# Patient Record
Sex: FEMALE | Race: BLACK OR AFRICAN AMERICAN | Employment: FULL TIME | URBAN - METROPOLITAN AREA
[De-identification: names, ages, dates, MRNs, and addresses within clinical notes are randomized per-mention and may not be internally consistent; named-entity substitution may affect disease eponyms.]

---

## 2018-08-13 ENCOUNTER — HOSPITAL ENCOUNTER (EMERGENCY)
Age: 28
Discharge: HOME | End: 2018-08-14
Attending: EMERGENCY MEDICINE
Payer: COMMERCIAL

## 2018-08-13 ENCOUNTER — APPOINTMENT (OUTPATIENT)
Dept: ULTRASOUND IMAGING | Age: 28
End: 2018-08-13
Payer: COMMERCIAL

## 2018-08-13 VITALS
TEMPERATURE: 99.1 F | DIASTOLIC BLOOD PRESSURE: 102 MMHG | OXYGEN SATURATION: 98 % | BODY MASS INDEX: 47.09 KG/M2 | WEIGHT: 293 LBS | SYSTOLIC BLOOD PRESSURE: 159 MMHG | RESPIRATION RATE: 14 BRPM | HEART RATE: 80 BPM | HEIGHT: 66 IN

## 2018-08-13 DIAGNOSIS — N89.8 VAGINAL DISCHARGE: Primary | ICD-10-CM

## 2018-08-13 LAB
-: ABNORMAL
AMORPHOUS: ABNORMAL
BACTERIA: ABNORMAL
BILIRUBIN URINE: NEGATIVE
CASTS UA: ABNORMAL /LPF (ref 0–8)
COLOR: YELLOW
COMMENT UA: ABNORMAL
CRYSTALS, UA: ABNORMAL /HPF
DIRECT EXAM: NORMAL
EPITHELIAL CELLS UA: ABNORMAL /HPF (ref 0–5)
GLUCOSE URINE: NEGATIVE
HCG(URINE) PREGNANCY TEST: NEGATIVE
KETONES, URINE: NEGATIVE
LEUKOCYTE ESTERASE, URINE: NEGATIVE
Lab: NORMAL
MUCUS: ABNORMAL
NITRITE, URINE: NEGATIVE
OTHER OBSERVATIONS UA: ABNORMAL
PH UA: 5.5 (ref 5–8)
PROTEIN UA: NEGATIVE
RBC UA: ABNORMAL /HPF (ref 0–4)
RENAL EPITHELIAL, UA: ABNORMAL /HPF
SPECIFIC GRAVITY UA: 1.02 (ref 1–1.03)
SPECIMEN DESCRIPTION: NORMAL
STATUS: NORMAL
TRICHOMONAS: ABNORMAL
TURBIDITY: CLEAR
URINE HGB: ABNORMAL
UROBILINOGEN, URINE: NORMAL
WBC UA: ABNORMAL /HPF (ref 0–5)
YEAST: ABNORMAL

## 2018-08-13 PROCEDURE — 87591 N.GONORRHOEAE DNA AMP PROB: CPT

## 2018-08-13 PROCEDURE — 81001 URINALYSIS AUTO W/SCOPE: CPT

## 2018-08-13 PROCEDURE — 76830 TRANSVAGINAL US NON-OB: CPT

## 2018-08-13 PROCEDURE — 87480 CANDIDA DNA DIR PROBE: CPT

## 2018-08-13 PROCEDURE — 99284 EMERGENCY DEPT VISIT MOD MDM: CPT

## 2018-08-13 PROCEDURE — 96374 THER/PROPH/DIAG INJ IV PUSH: CPT

## 2018-08-13 PROCEDURE — 93976 VASCULAR STUDY: CPT

## 2018-08-13 PROCEDURE — 6370000000 HC RX 637 (ALT 250 FOR IP): Performed by: EMERGENCY MEDICINE

## 2018-08-13 PROCEDURE — 87491 CHLMYD TRACH DNA AMP PROBE: CPT

## 2018-08-13 PROCEDURE — 87510 GARDNER VAG DNA DIR PROBE: CPT

## 2018-08-13 PROCEDURE — 6360000002 HC RX W HCPCS: Performed by: EMERGENCY MEDICINE

## 2018-08-13 PROCEDURE — 87660 TRICHOMONAS VAGIN DIR PROBE: CPT

## 2018-08-13 PROCEDURE — 84703 CHORIONIC GONADOTROPIN ASSAY: CPT

## 2018-08-13 RX ORDER — CEFTRIAXONE SODIUM 250 MG/1
250 INJECTION, POWDER, FOR SOLUTION INTRAMUSCULAR; INTRAVENOUS ONCE
Status: COMPLETED | OUTPATIENT
Start: 2018-08-13 | End: 2018-08-13

## 2018-08-13 RX ORDER — DOXYCYCLINE 100 MG/1
100 TABLET ORAL 2 TIMES DAILY
Qty: 28 TABLET | Refills: 0 | Status: SHIPPED | OUTPATIENT
Start: 2018-08-13 | End: 2018-08-27

## 2018-08-13 RX ORDER — METRONIDAZOLE 500 MG/1
500 TABLET ORAL 2 TIMES DAILY
Qty: 28 TABLET | Refills: 0 | Status: SHIPPED | OUTPATIENT
Start: 2018-08-13 | End: 2018-08-27

## 2018-08-13 RX ORDER — AZITHROMYCIN 250 MG/1
1000 TABLET, FILM COATED ORAL ONCE
Status: COMPLETED | OUTPATIENT
Start: 2018-08-13 | End: 2018-08-13

## 2018-08-13 RX ADMIN — CEFTRIAXONE SODIUM 250 MG: 250 INJECTION, POWDER, FOR SOLUTION INTRAMUSCULAR; INTRAVENOUS at 23:12

## 2018-08-13 RX ADMIN — AZITHROMYCIN 1000 MG: 250 TABLET, FILM COATED ORAL at 23:12

## 2018-08-13 ASSESSMENT — PAIN DESCRIPTION - DESCRIPTORS
DESCRIPTORS_2: SHOOTING;ACHING;SHARP
DESCRIPTORS: SHARP;ACHING;SHOOTING

## 2018-08-13 ASSESSMENT — PAIN DESCRIPTION - PAIN TYPE
TYPE_2: ACUTE PAIN
TYPE: ACUTE PAIN

## 2018-08-13 ASSESSMENT — PAIN SCALES - GENERAL: PAINLEVEL_OUTOF10: 8

## 2018-08-13 ASSESSMENT — PAIN DESCRIPTION - LOCATION
LOCATION_2: ABDOMEN
LOCATION: FLANK

## 2018-08-13 ASSESSMENT — PAIN DESCRIPTION - ONSET
ONSET_2: SUDDEN
ONSET: SUDDEN

## 2018-08-13 ASSESSMENT — PAIN DESCRIPTION - FREQUENCY: FREQUENCY: INTERMITTENT

## 2018-08-13 ASSESSMENT — PAIN DESCRIPTION - INTENSITY: RATING_2: 8

## 2018-08-13 ASSESSMENT — PAIN DESCRIPTION - PROGRESSION
CLINICAL_PROGRESSION: GRADUALLY WORSENING
CLINICAL_PROGRESSION_2: GRADUALLY WORSENING

## 2018-08-13 ASSESSMENT — PAIN DESCRIPTION - DURATION: DURATION_2: INTERMITTENT

## 2018-08-13 ASSESSMENT — PAIN DESCRIPTION - ORIENTATION
ORIENTATION_2: LOWER
ORIENTATION: RIGHT

## 2018-08-13 NOTE — ED NOTES
Bed: 44  Expected date:   Expected time:   Means of arrival:   Comments:     Roshan Chris RN  08/13/18 1955

## 2018-08-14 LAB
C TRACH DNA GENITAL QL NAA+PROBE: NEGATIVE
N. GONORRHOEAE DNA: NEGATIVE

## 2018-08-14 ASSESSMENT — ENCOUNTER SYMPTOMS
SORE THROAT: 0
VOMITING: 0
ABDOMINAL PAIN: 1
SHORTNESS OF BREATH: 0

## 2018-08-14 NOTE — ED PROVIDER NOTES
OBGYN      OUTSTANDING TASKS / RECOMMENDATIONS:    1. Await ultrasound results  2. If ultrasound unremarkable, discharge with doxycycline and OBGYN follow-up. FINAL IMPRESSION:     1. Vaginal discharge      Ultrasound unremarkable for torsion, tuboovarian abscess, or migration of IUD. Patient updated on results. Doxycycline prescription provided. Patient plans to arrange follow up with [facility] tomorrow. Patient also plans to follow up with her PCP. I counseled the patient to return to the ED for any worsening symptoms. Patient discharged in stable condition.     DISPOSITION:         DISPOSITION:  [x]  Discharge   []  Transfer -    []  Admission -     []  Against Medical Advice   []  Eloped   FOLLOW-UP: [facility]  [address] [facility] [id number] [phone]    If symptoms worsen    Jeffrey William, [address] [facility]  [phone]    Schedule an appointment as soon as possible for a visit        DISCHARGE MEDICATIONS: Discharge Medication List as of 8/13/2018 11:24 PM      START taking these medications    Details   doxycycline monohydrate (ADOXA) 100 MG tablet Take 1 tablet by mouth 2 times daily for 14 days, Disp-28 tablet, R-0Print      metroNIDAZOLE (FLAGYL) 500 MG tablet Take 1 tablet by mouth 2 times daily for 14 days, Disp-28 tablet, R-0Print                Brannon Mckay MD  Emergency Medicine Resident  [address], [place]  81/64/15 [phone]

## 2018-08-14 NOTE — ED PROVIDER NOTES
but appears nontoxic. Abdomen is benign. No pelvic or abdominal tenderness. No CVA tenderness. Impression is pelvic pain, consider UTI, stone, ovarian pathology, unlikely torsion, consider TOA or PID. Plan is hCG, urinalysis, pelvic exam.  If pelvic tenderness would perform ultrasound to assess location of IUD and to exclude TOA or torsion. Becka Rodarte.  Librado Briceno MD, [facility]  Attending Emergency  Physician                Abigail Gaitan MD  08/13/18 [id number]

## 2018-08-14 NOTE — ED PROVIDER NOTES
[address] [facility]  ED  Emergency Department Encounter  Emergency Medicine Resident     Pt Name: Octavio Pozo  MRN: [id number]  [place] 1990  Date of evaluation: 8/13/18  PCP:  No primary care provider on file. CHIEF COMPLAINT       Chief Complaint   Patient presents with    Pelvic Pain     Ongoing for a month. No problems urinating. Pt does have an IUD.  Flank Pain     Right flank ongoing for a month as well. HISTORY OF PRESENT ILLNESS  (Location/Symptom, Timing/Onset, Context/Setting, Quality, Duration, Modifying Factors, Severity.)      Octavio Pozo is a 33 y.o. female who presents with Pelvic pain. Patient states she's been having lower abdominal and pelvic pain wrapping around to her right flank for the past month. She states she is tolerating by mouth intake. Denies any nausea or vomiting. Denies any hematuria, dysuria, does states she's been having vaginal discharge and would like to be tested and treated for STDs. She states she had IUD placed 2 months ago and has not had follow-up since then. Denies any fevers or chills. PAST MEDICAL / SURGICAL / SOCIAL / FAMILY HISTORY      has no past medical history on file. has no past surgical history on file. Social History     Social History    Marital status: Single     Spouse name: N/A    Number of children: N/A    Years of education: N/A     Occupational History    Not on file. Social History Main Topics    Smoking status: Not on file    Smokeless tobacco: Not on file    Alcohol use Not on file    Drug use: Unknown    Sexual activity: Not on file     Other Topics Concern    Not on file     Social History Narrative    No narrative on file       No family history on file. Allergies:  Patient has no allergy information on record. Home Medications:  Prior to Admission medications    Medication Sig Start Date End Date Taking?  Authorizing Provider   doxycycline monohydrate (ADOXA) 100 MG tablet Ref Range    Color, UA YELLOW YEL    Turbidity UA CLEAR CLEAR    Glucose, Ur NEGATIVE NEG    Bilirubin Urine NEGATIVE NEG    Ketones, Urine NEGATIVE NEG    Specific [place], UA 1.021 1.005 - 1.030    Urine Hgb SMALL (A) NEG    pH, UA 5.5 5.0 - 8.0    Protein, UA NEGATIVE NEG    Urobilinogen, Urine Normal NORM    Nitrite, Urine NEGATIVE NEG    Leukocyte Esterase, Urine NEGATIVE NEG    Urinalysis Comments NOT REPORTED    Pregnancy, Urine   Result Value Ref Range    HCG(Urine) Pregnancy Test NEGATIVE NEG   Microscopic Urinalysis   Result Value Ref Range    -          WBC, UA 2 TO 5 0 - 5 /HPF    RBC, UA 10 TO 20 0 - 4 /HPF    Casts UA 2 TO 5 HYALINE 0 - 8 /LPF    Crystals UA NOT REPORTED NONE /HPF    Epithelial Cells UA 2 TO 5 0 - 5 /HPF    Renal Epithelial, Urine NOT REPORTED 0 /HPF    Bacteria, UA FEW (A) NONE    Mucus, UA NOT REPORTED NONE    Trichomonas, UA NOT REPORTED NONE    Amorphous, UA NOT REPORTED NONE    Other Observations UA NOT REPORTED NREQ    Yeast, UA NOT REPORTED NONE       RADIOLOGY:  None    EKG  None    All EKG's are interpreted by the Emergency Department Physician who either signs or Co-signs this chart in the absence of a cardiologist.    MDM/EMERGENCY DEPARTMENT COURSE:  Patient is a 30-year-old female that presents with pelvic pain, IUD placed 2 months ago. On exam she is well-appearing, nontoxic, afebrile, minimal suprapubic tenderness. Pelvic exam performed with breathing RN in room, yellowish vaginal discharge present in the vaginal vault, no masses, no lesions, no bleeding visualized, on bimanual exam patient has left adnexal tenderness with no masses palpated, no cervical motion tenderness. Urinalysis negative, vaginitis negative, we'll treat patient with Rocephin and Zithromax. Unable to visualize the IUD string during pelvic exam cold ultrasound to rule out any migration of IUD and to rule out tubo-ovarian abscess. Ultrasound showing IUD in place, no acute pathology.   Patient will be discharged with doxycycline for PID treatment. Number given for gynecology follow-up. Instructed to return if symptoms worsen. Patient signed out to Dr. Camryn Coley. PROCEDURES:  None    CONSULTS:  None    CRITICAL CARE:  None    FINAL IMPRESSION      1. Vaginal discharge          DISPOSITION / PLAN     DISPOSITION Decision To Discharge 08/14/2018 12:53:15 AM      PATIENT REFERRED TO:  [facility] ED  [address] [address]  [phone]    If symptoms worsen    Ronakerum Edna, [address] [facility]  [phone]    Schedule an appointment as soon as possible for a visit         DISCHARGE MEDICATIONS:  Discharge Medication List as of 8/13/2018 11:24 PM      START taking these medications    Details   doxycycline monohydrate (ADOXA) 100 MG tablet Take 1 tablet by mouth 2 times daily for 14 days, Disp-28 tablet, R-0Print      metroNIDAZOLE (FLAGYL) 500 MG tablet Take 1 tablet by mouth 2 times daily for 14 days, Disp-28 tablet, R-0Print             Frannie Bailey MD  Emergency Medicine Resident    (Please note that portions of this note were completed with a voice recognition program.  Efforts were made to edit the dictations but occasionally words are mis-transcribed. )        Frannie Bailey MD  Resident  08/14/18 [id number]

## 2018-12-07 ENCOUNTER — OFFICE VISIT (OUTPATIENT)
Dept: OBGYN | Age: 28
End: 2018-12-07
Payer: COMMERCIAL

## 2018-12-07 VITALS
DIASTOLIC BLOOD PRESSURE: 70 MMHG | HEART RATE: 60 BPM | SYSTOLIC BLOOD PRESSURE: 130 MMHG | WEIGHT: 293 LBS | BODY MASS INDEX: 60.85 KG/M2

## 2018-12-07 DIAGNOSIS — Z72.0 TOBACCO USE: ICD-10-CM

## 2018-12-07 DIAGNOSIS — Z30.432 ENCOUNTER FOR IUD REMOVAL: Primary | ICD-10-CM

## 2018-12-07 DIAGNOSIS — N93.9 ABNORMAL UTERINE BLEEDING: ICD-10-CM

## 2018-12-07 DIAGNOSIS — Z30.430 ENCOUNTER FOR INSERTION OF MIRENA IUD: ICD-10-CM

## 2018-12-07 DIAGNOSIS — F12.10 MARIJUANA ABUSE: ICD-10-CM

## 2018-12-07 PROCEDURE — 4004F PT TOBACCO SCREEN RCVD TLK: CPT | Performed by: STUDENT IN AN ORGANIZED HEALTH CARE EDUCATION/TRAINING PROGRAM

## 2018-12-07 PROCEDURE — 99203 OFFICE O/P NEW LOW 30 MIN: CPT | Performed by: STUDENT IN AN ORGANIZED HEALTH CARE EDUCATION/TRAINING PROGRAM

## 2018-12-07 PROCEDURE — G8417 CALC BMI ABV UP PARAM F/U: HCPCS | Performed by: STUDENT IN AN ORGANIZED HEALTH CARE EDUCATION/TRAINING PROGRAM

## 2018-12-07 PROCEDURE — G8428 CUR MEDS NOT DOCUMENT: HCPCS | Performed by: STUDENT IN AN ORGANIZED HEALTH CARE EDUCATION/TRAINING PROGRAM

## 2018-12-07 PROCEDURE — G8484 FLU IMMUNIZE NO ADMIN: HCPCS | Performed by: STUDENT IN AN ORGANIZED HEALTH CARE EDUCATION/TRAINING PROGRAM

## 2018-12-07 PROCEDURE — 99212 OFFICE O/P EST SF 10 MIN: CPT | Performed by: STUDENT IN AN ORGANIZED HEALTH CARE EDUCATION/TRAINING PROGRAM

## 2018-12-07 NOTE — PROGRESS NOTES
Darryl Alcaraz  2018    YOB: 1990      HPI:  Darryl Alcaraz is a 33 y.o. female [id number] The patient was seen today as a new patient, previously established with OB/GYN at [facility] in [facility] (Dr. Hermila Cleaning). She is here regarding IUD removal. Patient reports she had IUD placed on 18 secondary to abnormal uterine bleeding and reports bleeding has improved with IUD. She states she would like her IUD removed as she and her partner desires pregnancy. Patient has no complaints today. Patient reports she is currently sexually active with one male partner and uses IUD for contraception. Her bowels are regular and she is voiding without difficulty. Denies F/C, HA, VC, CP, SOB, RUQ pain, N/V/D/C, urinary symptoms or vaginal discharge. Patient reports she just wanted a consultation today, and would like to come back next week for IUD removal. She states she is concerned she will begin bleeding after IUD is removed and she would like to wait until after the weekend. Obstetric History       T2      L2     SAB0   TAB0   Ectopic0   Molar0   Multiple0   Live Births2       # Outcome Date GA Lbr Rodger/2nd Weight Sex Delivery Anes PTL Lv   3 AB 16 10w0d    SAB   ND   2 Term 09 39w3d  7 lb 8 oz (3.402 kg) F Vag-Spont EPI  REED      Name: Nina Becerril   1 Term 08 40w1d  6 lb 15 oz (3.147 kg) F Vag-Spont EPI N REED      Name: [facility]      Obstetric Comments   Same FOB       History reviewed. No pertinent past medical history. Past Surgical History:   Procedure Laterality Date    WISDOM TOOTH EXTRACTION Left        Family History   Problem Relation Age of Onset    Diabetes Mother        Social History     Social History    Marital status: Single     Spouse name: N/A    Number of children: N/A    Years of education: N/A     Occupational History    Not on file.      Social History Main Topics    Smoking status: Current Every Day Smoker     Types: Cigars     Start date: 2003    Smokeless tobacco: Never Used      Comment: 1-2 black and milds daily    Alcohol use Yes      Comment: Occasionally     Drug use: Yes     Types: Marijuana      Comment: daily use    Sexual activity: Yes     Partners: Male     Birth control/ protection: IUD     Other Topics Concern    Not on file     Social History Narrative    No narrative on file         MEDICATIONS:  No current outpatient prescriptions on file. No current facility-administered medications for this visit. ALLERGIES:  Allergies as of 12/07/2018    (No Known Allergies)         REVIEW OF SYSTEMS:       A minimum of an eleven point review of systems was completed. Review Of Systems (11 point):  Constitutional: No fever, chills or malaise; No weight change or fatigue  Head and Eyes: No vision, Headache, Dizziness or trauma in last 12 months  ENT ROS: No hearing, Tinnitis, sinus or taste problems  Hematological and Lymphatic ROS:No Lymphoma, Von Willebrand's, Hemophillia or Bleeding History  Psych ROS: No Depression, Homicidal thoughts,suicidal thoughts, or anxiety  Breast ROS: No prior breast abnormalities or lumps  Respiratory ROS: No SOB, Pneumoniae,Cough, or Pulmonary Embolism History  Cardiovascular ROS: No Chest Pain with Exertion, Palpitations, Syncope, Edema, Arrhythmia  Gastrointestinal ROS: No Indigestion, Heartburn, Nausea, vomiting, Diarrhea, Constipation,or Bowel Changes; No Bloody Stools or melena  Genito-Urinary ROS: No Dysuria, Hematuria or Nocturia. No Urinary Incontinence or Vaginal Discharge  Musculoskeletal ROS: No Arthralgia, Arthritis,Gout,Osteoporosis or Rheumatism  Neurological ROS: No CVA, Migraines, Epilepsy, Seizure Hx, or Limb Weakness  Dermatological ROS: No Rash, Itching, Hives, Mole Changes or Cancer        Vitals:   Blood pressure 130/70, pulse 60, weight (!) 377 lb (171 kg), last menstrual period 06/01/2018. Abdomen: Soft non-tender; good bowel sounds.  No guarding, rebound or rigidity. No CVA tenderness bilaterally. Extremities: No calf tenderness, DTR 2/4, and No edema bilaterally    Pelvic: declined by patient today    Diagnostics:    Narrative   EXAMINATION:   PELVIC ULTRASOUND; DOPPLER EVALUATION       8/13/2018       TECHNIQUE:   Transvaginal pelvic ultrasound was performed.; DOPPLER ULTRASOUND OF THE   PELVIS       COMPARISON:   None       HISTORY:   ORDERING SYSTEM PROVIDED HISTORY: rule out torsion, recent IUD placed,   evaluate placement       FINDINGS:   Slightly limited examination due to patient's body habitus and associated   scanning characteristics.           Measurements:       Uterus:  9.2 x 5.7 x 4.8 cm.       Endometrial stripe:  6 mm       Right Ovary:  3.0 x 2.7 x 2.0 cm       Left Ovary:  3.1 x 1.9 x 2.1 cm           Ultrasound Findings:       Uterus: Uterus demonstrates normal myometrial echotexture.       Endometrial stripe: Echogenic intrauterine device in place, within the   uterine body and lower uterine segment.       Right Ovary: Right ovary is within normal limits.  There is normal arterial   and venous doppler flow.       Left Ovary:  Left ovary is within normal limits. There is normal arterial and   venous doppler flow.       Free Fluid: No evidence of free fluid.           Impression   1. No acute findings. 2. No evidence of ovarian torsion bilaterally. 3. Intrauterine device identified in the endometrial canal, within the   uterine body and lower uterine segment. Lab Results:  Results for orders placed or performed during the hospital encounter of 08/13/18   C.trachomatis N.gonorrhoeae DNA   Result Value Ref Range    C. trachomatis DNA NEGATIVE NEG    N. gonorrhoeae DNA NEGATIVE NEG   VAGINITIS DNA PROBE   Result Value Ref Range    Specimen Description . VAGINA     Special Requests NOT REPORTED     Direct Exam NEGATIVE for Candida sp.      Direct Exam NEGATIVE for Gardnerella vaginalis     Direct Exam NEGATIVE for

## 2018-12-14 ENCOUNTER — HOSPITAL ENCOUNTER (OUTPATIENT)
Age: 28
Setting detail: SPECIMEN
Discharge: HOME | End: 2018-12-14
Payer: COMMERCIAL

## 2018-12-14 ENCOUNTER — TELEPHONE (OUTPATIENT)
Dept: OBGYN | Age: 28
End: 2018-12-14

## 2018-12-14 ENCOUNTER — PROCEDURE VISIT (OUTPATIENT)
Dept: OBGYN | Age: 28
End: 2018-12-14
Payer: COMMERCIAL

## 2018-12-14 VITALS
BODY MASS INDEX: 61.75 KG/M2 | SYSTOLIC BLOOD PRESSURE: 151 MMHG | HEART RATE: 68 BPM | WEIGHT: 293 LBS | DIASTOLIC BLOOD PRESSURE: 94 MMHG

## 2018-12-14 DIAGNOSIS — I10 HYPERTENSION, UNSPECIFIED TYPE: ICD-10-CM

## 2018-12-14 DIAGNOSIS — I10 CHRONIC HYPERTENSION: ICD-10-CM

## 2018-12-14 DIAGNOSIS — Z30.432 ENCOUNTER FOR IUD REMOVAL: ICD-10-CM

## 2018-12-14 DIAGNOSIS — Z30.432 ENCOUNTER FOR IUD REMOVAL: Primary | ICD-10-CM

## 2018-12-14 LAB
CONTROL: PRESENT
ESTIMATED AVERAGE GLUCOSE: 105 MG/DL
HBA1C MFR BLD: 5.3 % (ref 4–6)
PREGNANCY TEST URINE, POC: NEGATIVE

## 2018-12-14 PROCEDURE — 81025 URINE PREGNANCY TEST: CPT | Performed by: STUDENT IN AN ORGANIZED HEALTH CARE EDUCATION/TRAINING PROGRAM

## 2018-12-14 PROCEDURE — 36415 COLL VENOUS BLD VENIPUNCTURE: CPT

## 2018-12-14 PROCEDURE — 58301 REMOVE INTRAUTERINE DEVICE: CPT | Performed by: STUDENT IN AN ORGANIZED HEALTH CARE EDUCATION/TRAINING PROGRAM

## 2018-12-14 PROCEDURE — 83036 HEMOGLOBIN GLYCOSYLATED A1C: CPT

## 2018-12-14 PROCEDURE — 99213 OFFICE O/P EST LOW 20 MIN: CPT | Performed by: STUDENT IN AN ORGANIZED HEALTH CARE EDUCATION/TRAINING PROGRAM

## 2018-12-14 RX ORDER — PNV NO.95/FERROUS FUM/FOLIC AC 28MG-0.8MG
1 TABLET ORAL DAILY
Qty: 30 TABLET | Refills: 5 | Status: SHIPPED | OUTPATIENT
Start: 2018-12-14 | End: 2019-01-15

## 2018-12-14 NOTE — PROGRESS NOTES
Attending Physician Statement  I have discussed the care of Prateek Fernandez, including pertinent history and exam findings,  with the resident. I have seen and examined the patient and the key elements of all parts of the encounter have been performed by me. I agree with the assessment, plan and orders as documented by the resident.   (GC Modifier)

## 2019-01-15 ENCOUNTER — APPOINTMENT (OUTPATIENT)
Dept: CT IMAGING | Age: 29
End: 2019-01-15
Payer: COMMERCIAL

## 2019-01-15 ENCOUNTER — HOSPITAL ENCOUNTER (EMERGENCY)
Age: 29
Discharge: HOME | End: 2019-01-15
Attending: EMERGENCY MEDICINE
Payer: COMMERCIAL

## 2019-01-15 VITALS
TEMPERATURE: 98.8 F | SYSTOLIC BLOOD PRESSURE: 139 MMHG | DIASTOLIC BLOOD PRESSURE: 86 MMHG | RESPIRATION RATE: 14 BRPM | HEART RATE: 69 BPM | OXYGEN SATURATION: 100 %

## 2019-01-15 DIAGNOSIS — R21 RASH AND OTHER NONSPECIFIC SKIN ERUPTION: Primary | ICD-10-CM

## 2019-01-15 LAB — PREGNANCY TEST URINE, POC: NEGATIVE

## 2019-01-15 PROCEDURE — 70486 CT MAXILLOFACIAL W/O DYE: CPT

## 2019-01-15 PROCEDURE — 99284 EMERGENCY DEPT VISIT MOD MDM: CPT

## 2019-01-15 RX ORDER — CETIRIZINE HYDROCHLORIDE 10 MG/1
10 TABLET ORAL DAILY
Status: DISCONTINUED | OUTPATIENT
Start: 2019-01-15 | End: 2019-01-15 | Stop reason: HOSPADM

## 2019-01-15 ASSESSMENT — ENCOUNTER SYMPTOMS
ABDOMINAL PAIN: 0
BLOOD IN STOOL: 0
SHORTNESS OF BREATH: 0
VOMITING: 0
CHEST TIGHTNESS: 0
COLOR CHANGE: 0
CHOKING: 0
FACIAL SWELLING: 1
PHOTOPHOBIA: 0
TROUBLE SWALLOWING: 0
DIARRHEA: 0
NAUSEA: 0
STRIDOR: 0
WHEEZING: 0

## 2019-01-15 ASSESSMENT — PAIN DESCRIPTION - FREQUENCY: FREQUENCY: CONTINUOUS

## 2019-01-15 ASSESSMENT — PAIN DESCRIPTION - DESCRIPTORS: DESCRIPTORS: ACHING

## 2019-01-15 ASSESSMENT — PAIN DESCRIPTION - ORIENTATION: ORIENTATION: LEFT

## 2019-01-15 ASSESSMENT — PAIN DESCRIPTION - LOCATION: LOCATION: FACE

## 2019-02-09 ENCOUNTER — HOSPITAL ENCOUNTER (EMERGENCY)
Age: 29
Discharge: HOME | End: 2019-02-09
Attending: EMERGENCY MEDICINE
Payer: COMMERCIAL

## 2019-02-09 ENCOUNTER — APPOINTMENT (OUTPATIENT)
Dept: GENERAL RADIOLOGY | Age: 29
End: 2019-02-09
Payer: COMMERCIAL

## 2019-02-09 VITALS
RESPIRATION RATE: 14 BRPM | SYSTOLIC BLOOD PRESSURE: 156 MMHG | BODY MASS INDEX: 62.14 KG/M2 | DIASTOLIC BLOOD PRESSURE: 101 MMHG | TEMPERATURE: 98.1 F | OXYGEN SATURATION: 99 % | HEART RATE: 87 BPM | WEIGHT: 293 LBS

## 2019-02-09 DIAGNOSIS — S60.221A CONTUSION OF RIGHT HAND, INITIAL ENCOUNTER: Primary | ICD-10-CM

## 2019-02-09 PROCEDURE — 6370000000 HC RX 637 (ALT 250 FOR IP): Performed by: STUDENT IN AN ORGANIZED HEALTH CARE EDUCATION/TRAINING PROGRAM

## 2019-02-09 PROCEDURE — 73130 X-RAY EXAM OF HAND: CPT

## 2019-02-09 PROCEDURE — G0382 LEV 3 HOSP TYPE B ED VISIT: HCPCS

## 2019-02-09 RX ORDER — IBUPROFEN 600 MG/1
600 TABLET ORAL EVERY 6 HOURS PRN
Qty: 30 TABLET | Refills: 0 | Status: ON HOLD | OUTPATIENT
Start: 2019-02-09 | End: 2021-08-27 | Stop reason: HOSPADM

## 2019-02-09 RX ORDER — ACETAMINOPHEN 500 MG
1000 TABLET ORAL EVERY 6 HOURS PRN
Qty: 30 TABLET | Refills: 0 | Status: SHIPPED | OUTPATIENT
Start: 2019-02-09 | End: 2019-11-26 | Stop reason: ALTCHOICE

## 2019-02-09 RX ORDER — IBUPROFEN 800 MG/1
800 TABLET ORAL ONCE
Status: COMPLETED | OUTPATIENT
Start: 2019-02-09 | End: 2019-02-09

## 2019-02-09 RX ADMIN — IBUPROFEN 800 MG: 800 TABLET ORAL at 19:53

## 2019-02-09 ASSESSMENT — PAIN SCALES - GENERAL
PAINLEVEL_OUTOF10: 8
PAINLEVEL_OUTOF10: 8

## 2019-02-09 ASSESSMENT — PAIN DESCRIPTION - PAIN TYPE: TYPE: ACUTE PAIN

## 2019-02-09 ASSESSMENT — PAIN DESCRIPTION - PROGRESSION: CLINICAL_PROGRESSION: GRADUALLY WORSENING

## 2019-02-09 ASSESSMENT — PAIN DESCRIPTION - DESCRIPTORS: DESCRIPTORS: SHARP;THROBBING

## 2019-02-09 ASSESSMENT — PAIN DESCRIPTION - FREQUENCY: FREQUENCY: CONTINUOUS

## 2019-02-09 ASSESSMENT — PAIN DESCRIPTION - LOCATION: LOCATION: HAND

## 2019-02-09 ASSESSMENT — PAIN DESCRIPTION - ORIENTATION: ORIENTATION: RIGHT

## 2019-02-10 ASSESSMENT — ENCOUNTER SYMPTOMS
EYE REDNESS: 0
ABDOMINAL PAIN: 0
COLOR CHANGE: 0
EYE DISCHARGE: 0
SHORTNESS OF BREATH: 0

## 2019-04-24 ENCOUNTER — TELEPHONE (OUTPATIENT)
Dept: OBGYN | Age: 29
End: 2019-04-24

## 2019-11-26 ENCOUNTER — HOSPITAL ENCOUNTER (EMERGENCY)
Age: 29
Discharge: HOME | End: 2019-11-26
Attending: EMERGENCY MEDICINE
Payer: COMMERCIAL

## 2019-11-26 VITALS
RESPIRATION RATE: 14 BRPM | HEIGHT: 66 IN | WEIGHT: 293 LBS | HEART RATE: 73 BPM | TEMPERATURE: 97.3 F | BODY MASS INDEX: 47.09 KG/M2 | SYSTOLIC BLOOD PRESSURE: 165 MMHG | OXYGEN SATURATION: 100 % | DIASTOLIC BLOOD PRESSURE: 95 MMHG

## 2019-11-26 DIAGNOSIS — Z01.419 NORMAL VAGINAL EXAM: ICD-10-CM

## 2019-11-26 DIAGNOSIS — R51.9 NONINTRACTABLE HEADACHE, UNSPECIFIED CHRONICITY PATTERN, UNSPECIFIED HEADACHE TYPE: Primary | ICD-10-CM

## 2019-11-26 LAB
-: NORMAL
AMORPHOUS: NORMAL
BACTERIA: NORMAL
BILIRUBIN URINE: NEGATIVE
CASTS UA: NORMAL /LPF (ref 0–8)
COLOR: ABNORMAL
COMMENT UA: ABNORMAL
CRYSTALS, UA: NORMAL /HPF
DIRECT EXAM: ABNORMAL
EPITHELIAL CELLS UA: NORMAL /HPF (ref 0–5)
GLUCOSE URINE: NEGATIVE
HCG(URINE) PREGNANCY TEST: NEGATIVE
KETONES, URINE: NEGATIVE
LEUKOCYTE ESTERASE, URINE: ABNORMAL
Lab: ABNORMAL
MUCUS: NORMAL
NITRITE, URINE: NEGATIVE
OTHER OBSERVATIONS UA: NORMAL
PH UA: 7 (ref 5–8)
PROTEIN UA: ABNORMAL
RBC UA: NORMAL /HPF (ref 0–4)
RENAL EPITHELIAL, UA: NORMAL /HPF
SPECIFIC GRAVITY UA: 1.02 (ref 1–1.03)
SPECIMEN DESCRIPTION: ABNORMAL
TRICHOMONAS: NORMAL
TURBIDITY: CLEAR
URINE HGB: ABNORMAL
UROBILINOGEN, URINE: NORMAL
WBC UA: NORMAL /HPF (ref 0–5)
YEAST: NORMAL

## 2019-11-26 PROCEDURE — 87591 N.GONORRHOEAE DNA AMP PROB: CPT

## 2019-11-26 PROCEDURE — 87660 TRICHOMONAS VAGIN DIR PROBE: CPT

## 2019-11-26 PROCEDURE — 87510 GARDNER VAG DNA DIR PROBE: CPT

## 2019-11-26 PROCEDURE — 86403 PARTICLE AGGLUT ANTBDY SCRN: CPT

## 2019-11-26 PROCEDURE — 96374 THER/PROPH/DIAG INJ IV PUSH: CPT

## 2019-11-26 PROCEDURE — 87086 URINE CULTURE/COLONY COUNT: CPT

## 2019-11-26 PROCEDURE — 81001 URINALYSIS AUTO W/SCOPE: CPT

## 2019-11-26 PROCEDURE — 96375 TX/PRO/DX INJ NEW DRUG ADDON: CPT

## 2019-11-26 PROCEDURE — 6360000002 HC RX W HCPCS: Performed by: NURSE PRACTITIONER

## 2019-11-26 PROCEDURE — 87491 CHLMYD TRACH DNA AMP PROBE: CPT

## 2019-11-26 PROCEDURE — 81025 URINE PREGNANCY TEST: CPT

## 2019-11-26 PROCEDURE — 99284 EMERGENCY DEPT VISIT MOD MDM: CPT

## 2019-11-26 PROCEDURE — 87480 CANDIDA DNA DIR PROBE: CPT

## 2019-11-26 PROCEDURE — 6370000000 HC RX 637 (ALT 250 FOR IP): Performed by: NURSE PRACTITIONER

## 2019-11-26 PROCEDURE — 2580000003 HC RX 258: Performed by: NURSE PRACTITIONER

## 2019-11-26 RX ORDER — ONDANSETRON 4 MG/1
4 TABLET, FILM COATED ORAL EVERY 8 HOURS PRN
Qty: 20 TABLET | Refills: 0 | Status: SHIPPED | OUTPATIENT
Start: 2019-11-26 | End: 2021-06-21

## 2019-11-26 RX ORDER — ACETAMINOPHEN 500 MG
1000 TABLET ORAL ONCE
Status: COMPLETED | OUTPATIENT
Start: 2019-11-26 | End: 2019-11-26

## 2019-11-26 RX ORDER — 0.9 % SODIUM CHLORIDE 0.9 %
1000 INTRAVENOUS SOLUTION INTRAVENOUS ONCE
Status: COMPLETED | OUTPATIENT
Start: 2019-11-26 | End: 2019-11-26

## 2019-11-26 RX ORDER — DIPHENHYDRAMINE HYDROCHLORIDE 50 MG/ML
25 INJECTION INTRAMUSCULAR; INTRAVENOUS ONCE
Status: COMPLETED | OUTPATIENT
Start: 2019-11-26 | End: 2019-11-26

## 2019-11-26 RX ORDER — ACETAMINOPHEN 500 MG
1000 TABLET ORAL EVERY 6 HOURS PRN
Qty: 60 TABLET | Refills: 0 | Status: SHIPPED | OUTPATIENT
Start: 2019-11-26 | End: 2021-06-21

## 2019-11-26 RX ORDER — ONDANSETRON 2 MG/ML
4 INJECTION INTRAMUSCULAR; INTRAVENOUS ONCE
Status: COMPLETED | OUTPATIENT
Start: 2019-11-26 | End: 2019-11-26

## 2019-11-26 RX ADMIN — ACETAMINOPHEN 1000 MG: 500 TABLET ORAL at 12:05

## 2019-11-26 RX ADMIN — DIPHENHYDRAMINE HYDROCHLORIDE 25 MG: 50 INJECTION, SOLUTION INTRAMUSCULAR; INTRAVENOUS at 11:37

## 2019-11-26 RX ADMIN — SODIUM CHLORIDE 1000 ML: 9 INJECTION, SOLUTION INTRAVENOUS at 11:37

## 2019-11-26 RX ADMIN — ONDANSETRON 4 MG: 2 INJECTION INTRAMUSCULAR; INTRAVENOUS at 11:49

## 2019-11-26 ASSESSMENT — ENCOUNTER SYMPTOMS
EYE REDNESS: 0
TROUBLE SWALLOWING: 0
EYE PAIN: 0
BACK PAIN: 0
CONSTIPATION: 0
FACIAL SWELLING: 0
DIARRHEA: 0
SHORTNESS OF BREATH: 0
COUGH: 0
VOMITING: 0
VOICE CHANGE: 0
RHINORRHEA: 0
BLOOD IN STOOL: 0
NAUSEA: 1
RECTAL PAIN: 0
CHEST TIGHTNESS: 0
ABDOMINAL PAIN: 0
PHOTOPHOBIA: 1

## 2019-11-26 ASSESSMENT — VISUAL ACUITY: OU: 1

## 2019-11-26 ASSESSMENT — PAIN DESCRIPTION - DESCRIPTORS: DESCRIPTORS: DISCOMFORT

## 2019-11-26 ASSESSMENT — PAIN DESCRIPTION - PAIN TYPE: TYPE: ACUTE PAIN

## 2019-11-26 ASSESSMENT — PAIN DESCRIPTION - LOCATION: LOCATION: HEAD

## 2019-11-26 ASSESSMENT — PAIN SCALES - GENERAL: PAINLEVEL_OUTOF10: 8

## 2019-11-27 LAB
C TRACH DNA GENITAL QL NAA+PROBE: NEGATIVE
N. GONORRHOEAE DNA: NEGATIVE
SPECIMEN DESCRIPTION: NORMAL

## 2019-11-28 LAB
CULTURE: ABNORMAL
Lab: ABNORMAL
SPECIMEN DESCRIPTION: ABNORMAL

## 2020-01-02 ENCOUNTER — HOSPITAL ENCOUNTER (EMERGENCY)
Age: 30
Discharge: HOME | End: 2020-01-02
Attending: EMERGENCY MEDICINE
Payer: COMMERCIAL

## 2020-01-02 VITALS
RESPIRATION RATE: 17 BRPM | DIASTOLIC BLOOD PRESSURE: 101 MMHG | SYSTOLIC BLOOD PRESSURE: 154 MMHG | OXYGEN SATURATION: 100 % | TEMPERATURE: 97.2 F | HEART RATE: 71 BPM

## 2020-01-02 PROCEDURE — 99283 EMERGENCY DEPT VISIT LOW MDM: CPT

## 2020-01-02 ASSESSMENT — VISUAL ACUITY
OU: 20/50
OS: 20/25
OD: 20/20

## 2020-01-02 ASSESSMENT — ENCOUNTER SYMPTOMS
BLOOD IN STOOL: 0
EYE REDNESS: 0
NAUSEA: 0
DIARRHEA: 0
SORE THROAT: 0
EYE ITCHING: 0
BACK PAIN: 0
RHINORRHEA: 0
COUGH: 0
ABDOMINAL PAIN: 0
VOMITING: 0
SHORTNESS OF BREATH: 0

## 2020-01-02 ASSESSMENT — PAIN SCALES - GENERAL: PAINLEVEL_OUTOF10: 7

## 2020-01-02 ASSESSMENT — PAIN DESCRIPTION - LOCATION: LOCATION: ABDOMEN

## 2020-01-02 ASSESSMENT — PAIN DESCRIPTION - PAIN TYPE: TYPE: ACUTE PAIN

## 2020-01-02 NOTE — ED PROVIDER NOTES
[address] [facility]  ED  Emergency Department Encounter  Emergency Medicine Resident     Pt Name: Óscar Spann  MRN: [id number]  [place] 1990  Date ofevaluation: 1/2/20  PCP:  No primary care provider on file. CHIEF COMPLAINT       Chief Complaint   Patient presents with    Vision Problem     vision changes while at work and needs work note to return work. Pt denies blurred vision upon arrival. Pt also reports abdominal discomfort d/t gas     HISTORY OF PRESENT ILLNESS  (Location/Symptom, Timing/Onset, Context/Setting, Quality, Duration, Modifying Factors, Severity, Associated signs/symptoms)     Óscar Spann is a 26 y.o. female who presents with blurred vision. Patient reports that she was at work several days ago when she had some transiently blurred vision lasted for approximately 20 to 30 minutes. She states that she has never had this before, and denies any other associated symptoms. She states that she had some chest pain at the time and is currently on her menstrual period but denies any fevers, chills, weakness numbness tingling, headache or other concerns. Denies any nausea or vomiting. States that her vision was blurred but denies any total loss of vision or eye pain when this occurred. She is otherwise healthy and takes no medications at baseline. Denies any neck pain, back pain. PAST MEDICAL / SURGICAL / SOCIAL / FAMILY HISTORY      has no past medical history on file. has a past surgical history that includes [place] tooth extraction (Left, 2014).     Social History     Socioeconomic History    Marital status: Single     Spouse name: Not on file    Number of children: Not on file    Years of education: Not on file    Highest education level: Not on file   Occupational History    Not on file   Social Needs    Financial resource strain: Not on file    Food insecurity:     Worry: Not on file     Inability: Not on file    Transportation needs:     Medical: Not on file     Non-medical: Not on file   Tobacco Use    Smoking status: Current Every Day Smoker     Types: Cigars     Start date: 2003    Smokeless tobacco: Never Used    Tobacco comment: 1-2 black and milds daily   Substance and Sexual Activity    Alcohol use: Yes     Comment: Occasionally     Drug use: Yes     Types: Marijuana     Comment: daily use    Sexual activity: Yes     Partners: Male     Birth control/protection: I.U.D. Lifestyle    Physical activity:     Days per week: Not on file     Minutes per session: Not on file    Stress: Not on file   Relationships    Social connections:     Talks on phone: Not on file     Gets together: Not on file     Attends Latter-day service: Not on file     Active member of club or organization: Not on file     Attends meetings of clubs or organizations: Not on file     Relationship status: Not on file    Intimate partner violence:     Fear of current or ex partner: Not on file     Emotionally abused: Not on file     Physically abused: Not on file     Forced sexual activity: Not on file   Other Topics Concern    Not on file   Social History Narrative    Not on file       Family History   Problem Relation Age of Onset    Diabetes Mother        Allergies:  Patient has no known allergies. Home Medications:  Prior to Admission medications    Medication Sig Start Date End Date Taking? Authorizing Provider   ondansetron (ZOFRAN) 4 MG tablet Take 1 tablet by mouth every 8 hours as needed for Nausea or Vomiting 11/26/19   Shahram Young, APRN - CNP   acetaminophen (TYLENOL) 500 MG tablet Take 2 tablets by mouth every 6 hours as needed for Pain 11/26/19   Shahram Young, APRN - CNP   ibuprofen (ADVIL;MOTRIN) 600 MG tablet Take 1 tablet by mouth every 6 hours as needed for Pain 2/9/19   Nicolette Watts, DO       REVIEW OF SYSTEMS    (2-9 systems for level 4, 10 or more for level 5)      Review of Systems   Constitutional: Negative for chills and fever.    HENT: Negative for rhinorrhea and sore throat. Eyes: Positive for visual disturbance. Negative for redness and itching. Respiratory: Negative for cough and shortness of breath. Cardiovascular: Positive for chest pain. Negative for palpitations. Gastrointestinal: Negative for abdominal pain, blood in stool, diarrhea, nausea and vomiting. Genitourinary: Positive for vaginal bleeding. Negative for dysuria, frequency and hematuria. Musculoskeletal: Negative for back pain and neck pain. Skin: Negative for rash and wound. Allergic/Immunologic: Negative for environmental allergies and food allergies. Neurological: Negative for weakness, numbness and headaches. PHYSICAL EXAM   (up to 7 for level 4, 8 or more for level 5)      INITIAL VITALS:   BP (!) 154/101   Pulse 71   Temp 97.2 °F (36.2 °C) (Oral)   Resp 17   LMP 12/30/2019   SpO2 100%     Physical Exam  Vitals signs and nursing note reviewed. Constitutional:       General: She is not in acute distress. Appearance: Normal appearance. She is obese. She is not ill-appearing, toxic-appearing or diaphoretic. HENT:      Head: Normocephalic and atraumatic. Mouth/Throat:      Mouth: Mucous membranes are moist.      Pharynx: Oropharynx is clear. No oropharyngeal exudate or posterior oropharyngeal erythema. Eyes:      General: No scleral icterus. Extraocular Movements: Extraocular movements intact. Conjunctiva/sclera: Conjunctivae normal.      Pupils: Pupils are equal, round, and reactive to light. Comments: No gross visual deficits. Confrontation visual field is intact bilaterally. Neck:      Musculoskeletal: Neck supple. Cardiovascular:      Rate and Rhythm: Normal rate and regular rhythm. Heart sounds: Normal heart sounds. No friction rub. No gallop. Pulmonary:      Effort: Pulmonary effort is normal. No respiratory distress. Breath sounds: Normal breath sounds. No stridor. No wheezing, rhonchi or rales. Abdominal:      General: There is no distension. Palpations: Abdomen is soft. There is no mass. Tenderness: There is no tenderness. There is no guarding or rebound. Hernia: No hernia is present. Skin:     General: Skin is warm and dry. Findings: No rash (over exposed skin). Neurological:      General: No focal deficit present. Mental Status: She is alert and oriented to person, place, and time. Comments: CN II-XII intact. Full strength and sensation in upper and lower extremities bilaterally. Normal FNF      Psychiatric:         Mood and Affect: Mood normal.         Behavior: Behavior normal.       DIAGNOSTICS     PLAN (LABS / IMAGING / EKG):  No orders of the defined types were placed in this encounter. MEDICATIONS ORDERED:  No orders of the defined types were placed in this encounter. DIAGNOSTIC RESULTS / EMERGENCYDEPARTMENT COURSE / MDM     LABS:  No results found for this visit on 01/02/20. EMERGENCY DEPARTMENT COURSE:    Patient evaluated by attending physician and myself. Patient presenting with weird vision that began several days ago and lasted for approximately 30 minutes. No accompanying symptoms such as headache, weakness, numbness, tingling. Her vision is back to baseline now. On exam she is well-appearing no acute distress. She is hypertensive but vitals otherwise unremarkable. Heart regular rate and rhythm, lungs are clear to auscultation bilateral.  Abdomen soft nontender. Extraocular movements are intact, pupils are equal round and reactive to light bilaterally. Visual fields are intact by confrontation. Visual acuity roughly normal done per nursing staff. Differential diagnosis includes migraine headache, diabetic retinopathy, acute angle-closure glaucoma, retinal detachment, retinal artery/vein occlusion, among others. Doubt acute angle-closure glaucoma given lack of pain and return to baseline at this point.   Doubt retinal detachment for

## 2020-01-02 NOTE — ED PROVIDER NOTES
[address] [facility]     Emergency Department     Faculty Attestation    I performed a history and physical examination of the patient and discussed management with the resident. I have reviewed and agree with the residents findings including all diagnostic interpretations, and treatment plans as written at the time of my review. Any areas of disagreement are noted on the chart. I was personally present for the key portions of any procedures. I have documented in the chart those procedures where I was not present during the key portions. For Physician Assistant/ Nurse Practitioner cases/documentation I have personally evaluated this patient and have completed at least one if not all key elements of the E/M (history, physical exam, and MDM). Additional findings are as noted. Primary Care Physician: No primary care provider on file. History: This is a 26 y.o. female who presents to the Emergency Department with complaint of blurred vision. The patient states she had blurred vision approximately 30 minutes and is subsequent resolved. She denies any headache numbness, tingling or weakness. She denies any diplopia. Physical:   oral temperature is 97.2 °F (36.2 °C). Her blood pressure is 154/101 (abnormal) and her pulse is 71. Her respiration is 17 and oxygen saturation is 100%. Pupils equal round react light extraocular motions intact patient moves all extremities well. Impression: Blurred vision, resolved    Plan: Follow-up with ophthalmology    (Please note that portions of this note were completed with a voice recognition program.  Efforts were made to edit the dictations but occasionally words are mis-transcribed.)    Mehdi Nowak.  Juaquin Chi MD, [address] [facility]  Attending Emergency Medicine Physician        Viridiana Barrett MD  01/02/20 [id number]

## 2020-12-18 ENCOUNTER — HOSPITAL ENCOUNTER (EMERGENCY)
Age: 30
Discharge: HOME | End: 2020-12-18
Attending: EMERGENCY MEDICINE
Payer: COMMERCIAL

## 2020-12-18 VITALS
SYSTOLIC BLOOD PRESSURE: 161 MMHG | TEMPERATURE: 97.7 F | DIASTOLIC BLOOD PRESSURE: 94 MMHG | RESPIRATION RATE: 16 BRPM | OXYGEN SATURATION: 99 % | HEART RATE: 80 BPM

## 2020-12-18 LAB
-: ABNORMAL
AMORPHOUS: ABNORMAL
BACTERIA: ABNORMAL
BILIRUBIN URINE: NEGATIVE
CASTS UA: ABNORMAL /LPF (ref 0–2)
COLOR: YELLOW
COMMENT UA: ABNORMAL
CRYSTALS, UA: ABNORMAL /HPF
DIRECT EXAM: ABNORMAL
EPITHELIAL CELLS UA: ABNORMAL /HPF (ref 0–5)
GLUCOSE URINE: NEGATIVE
HCG(URINE) PREGNANCY TEST: NEGATIVE
KETONES, URINE: NEGATIVE
LEUKOCYTE ESTERASE, URINE: ABNORMAL
Lab: ABNORMAL
MUCUS: ABNORMAL
NITRITE, URINE: NEGATIVE
OTHER OBSERVATIONS UA: ABNORMAL
PH UA: 7 (ref 5–8)
PROTEIN UA: ABNORMAL
RBC UA: ABNORMAL /HPF (ref 0–2)
RENAL EPITHELIAL, UA: ABNORMAL /HPF
SPECIFIC GRAVITY UA: 1.02 (ref 1–1.03)
SPECIMEN DESCRIPTION: ABNORMAL
TRICHOMONAS: ABNORMAL
TURBIDITY: ABNORMAL
URINE HGB: ABNORMAL
UROBILINOGEN, URINE: NORMAL
WBC UA: ABNORMAL /HPF (ref 0–5)
YEAST: ABNORMAL

## 2020-12-18 PROCEDURE — 6370000000 HC RX 637 (ALT 250 FOR IP): Performed by: STUDENT IN AN ORGANIZED HEALTH CARE EDUCATION/TRAINING PROGRAM

## 2020-12-18 PROCEDURE — 99283 EMERGENCY DEPT VISIT LOW MDM: CPT

## 2020-12-18 PROCEDURE — 81025 URINE PREGNANCY TEST: CPT

## 2020-12-18 PROCEDURE — 87491 CHLMYD TRACH DNA AMP PROBE: CPT

## 2020-12-18 PROCEDURE — 87660 TRICHOMONAS VAGIN DIR PROBE: CPT

## 2020-12-18 PROCEDURE — 87086 URINE CULTURE/COLONY COUNT: CPT

## 2020-12-18 PROCEDURE — 87480 CANDIDA DNA DIR PROBE: CPT

## 2020-12-18 PROCEDURE — 87591 N.GONORRHOEAE DNA AMP PROB: CPT

## 2020-12-18 PROCEDURE — 87510 GARDNER VAG DNA DIR PROBE: CPT

## 2020-12-18 PROCEDURE — 86403 PARTICLE AGGLUT ANTBDY SCRN: CPT

## 2020-12-18 PROCEDURE — 81001 URINALYSIS AUTO W/SCOPE: CPT

## 2020-12-18 RX ORDER — CEPHALEXIN 500 MG/1
500 CAPSULE ORAL 4 TIMES DAILY
Qty: 12 CAPSULE | Refills: 0 | Status: SHIPPED | OUTPATIENT
Start: 2020-12-18 | End: 2020-12-21

## 2020-12-18 RX ORDER — FLUCONAZOLE 50 MG/1
150 TABLET ORAL ONCE
Status: COMPLETED | OUTPATIENT
Start: 2020-12-18 | End: 2020-12-18

## 2020-12-18 RX ORDER — CEPHALEXIN 250 MG/1
500 CAPSULE ORAL ONCE
Status: COMPLETED | OUTPATIENT
Start: 2020-12-18 | End: 2020-12-18

## 2020-12-18 RX ORDER — HYDROXYZINE HYDROCHLORIDE 25 MG/1
25 TABLET, FILM COATED ORAL ONCE
Status: COMPLETED | OUTPATIENT
Start: 2020-12-18 | End: 2020-12-18

## 2020-12-18 RX ADMIN — CEPHALEXIN 500 MG: 250 CAPSULE ORAL at 14:13

## 2020-12-18 RX ADMIN — FLUCONAZOLE 150 MG: 50 TABLET ORAL at 13:36

## 2020-12-18 RX ADMIN — MICONAZOLE NITRATE: 20 CREAM TOPICAL at 14:14

## 2020-12-18 RX ADMIN — HYDROXYZINE HYDROCHLORIDE 25 MG: 25 TABLET, FILM COATED ORAL at 13:28

## 2020-12-18 ASSESSMENT — ENCOUNTER SYMPTOMS
BACK PAIN: 0
SHORTNESS OF BREATH: 0
VOMITING: 0
NAUSEA: 0
ABDOMINAL PAIN: 0

## 2020-12-18 NOTE — ED TRIAGE NOTES
Pt to ED c/o vaginal itching x2 days. Pt stated she has an appointment scheduled on Monday to see ob/gyn. Pt denies vaginal discharge or bleeding. Pt does not have concern for STD's. Pt resting on stretcher, NAD noted. Call light in reach. Dr. King Arnold at bedside.

## 2020-12-18 NOTE — ED PROVIDER NOTES
[facility] ED  Emergency Department Encounter  Emergency Medicine Resident     Pt Name: Dianne Marte  MRN: [id number]  [place] 1990  Date of evaluation: 12/18/20  PCP:  No primary care provider on file. CHIEF COMPLAINT       Chief Complaint   Patient presents with    Vaginitis       HISTORY [facility]  (Location/Symptom, Timing/Onset, Context/Setting, Quality, Duration, Modifying Factors,Severity.)      Dianne Marte is a 31 y. o.yo female who presents with vaginal pruritis. Patient here with vaginal itch, no dysuria, no fevers or chills, no abdominal pain. States that she has not had any discharge but states that the vaginal itch has been there for 1 to 2 days, does not describe any erythema or swelling around the vaginal area but states that she does have symptoms of yeast as she has had this before. Patient denies headache, vision changes, nausea or vomiting. Denies any trauma to the pelvic region, denies exposure to STDs or unprotected sex. PAST MEDICAL / SURGICAL / SOCIAL / FAMILY HISTORY      has no past medical history on file. has a past surgical history that includes [place] tooth extraction (Left, 2014).      Social History     Socioeconomic History    Marital status: Single     Spouse name: Not on file    Number of children: Not on file    Years of education: Not on file    Highest education level: Not on file   Occupational History    Not on file   Social Needs    Financial resource strain: Not on file    Food insecurity     Worry: Not on file     Inability: Not on file    Transportation needs     Medical: Not on file     Non-medical: Not on file   Tobacco Use    Smoking status: Current Every Day Smoker     Types: Cigars     Start date: 2003    Smokeless tobacco: Never Used    Tobacco comment: 1-2 black and milds daily   Substance and Sexual Activity    Alcohol use: Yes     Comment: Occasionally     Drug use: Yes     Types: Marijuana Comment: daily use    Sexual activity: Yes     Partners: Male     Birth control/protection: I.U.D. Lifestyle    Physical activity     Days per week: Not on file     Minutes per session: Not on file    Stress: Not on file   Relationships    Social connections     Talks on phone: Not on file     Gets together: Not on file     Attends Pentecostalism service: Not on file     Active member of club or organization: Not on file     Attends meetings of clubs or organizations: Not on file     Relationship status: Not on file    Intimate partner violence     Fear of current or ex partner: Not on file     Emotionally abused: Not on file     Physically abused: Not on file     Forced sexual activity: Not on file   Other Topics Concern    Not on file   Social History Narrative    Not on file       Family History   Problem Relation Age of Onset    Diabetes Mother         Allergies:  Patient has no known allergies. Home Medications:  Prior to Admission medications    Medication Sig Start Date End Date Taking? Authorizing Provider   ondansetron (ZOFRAN) 4 MG tablet Take 1 tablet by mouth every 8 hours as needed for Nausea or Vomiting 11/26/19   Ada Young APRN - CNP   acetaminophen (TYLENOL) 500 MG tablet Take 2 tablets by mouth every 6 hours as needed for Pain 11/26/19   Ada Young, APRN - CNP   ibuprofen (ADVIL;MOTRIN) 600 MG tablet Take 1 tablet by mouth every 6 hours as needed for Pain 2/9/19   [facility], DO       REVIEW OFSYSTEMS    (2-9 systems for level 4, 10 or more for level 5)      Review of Systems   Constitutional: Negative for diaphoresis and fever. HENT: Negative for congestion. Eyes: Negative for visual disturbance. Respiratory: Negative for shortness of breath. Cardiovascular: Negative for chest pain. Gastrointestinal: Negative for abdominal pain, nausea and vomiting. Endocrine: Negative for polyuria. Genitourinary: Negative for dysuria.         Vaginal pruritus Musculoskeletal: Negative for back pain. Skin: Negative for wound. Neurological: Negative for headaches. Psychiatric/Behavioral: Negative for confusion. PHYSICAL EXAM   (up to 7 for level 4, 8 or more forlevel 5)      ED TRIAGE VITALS BP: (!) 161/94, Temp: 97.7 °F (36.5 °C), Pulse: 80, Resp: 16, SpO2: 99 %    Vitals:    12/18/20 1052 12/18/20 1058   BP:  (!) 161/94   Pulse:  80   Resp:  16   Temp: 97.7 °F (36.5 °C)    SpO2:  99%       Physical Exam  Constitutional:       General: She is not in acute distress. Appearance: She is well-developed. HENT:      Head: Normocephalic and atraumatic. Nose: Nose normal.   Eyes:      Pupils: Pupils are equal, round, and reactive to light. Neck:      Musculoskeletal: Normal range of motion and neck supple. Cardiovascular:      Rate and Rhythm: Normal rate and regular rhythm. Heart sounds: No murmur. Pulmonary:      Effort: Pulmonary effort is normal. No respiratory distress. Breath sounds: No stridor. No wheezing. Abdominal:      General: There is no distension. Palpations: Abdomen is soft. Tenderness: There is no abdominal tenderness. Genitourinary:     General: Normal vulva. Cervix: Discharge present. No cervical motion tenderness. Uterus: Normal.       Adnexa: Right adnexa normal and left adnexa normal.         Musculoskeletal: Normal range of motion. General: No tenderness. Skin:     General: Skin is warm and dry. Capillary Refill: Capillary refill takes less than 2 seconds. Findings: No erythema or rash. Neurological:      Mental Status: She is alert and oriented to person, place, and time. Sensory: No sensory deficit.       Deep Tendon Reflexes: Reflexes normal.   Psychiatric:         Behavior: Behavior normal.         DIFFERENTIAL  DIAGNOSIS     PLAN (LABS / IMAGING / EKG):  Orders Placed This Encounter   Procedures    Culture, Urine    VAGINITIS DNA PROBE    C.trachomatis N.gonorrhoeae DNA    Urinalysis, reflex to microscopic    Pregnancy, Urine    Microscopic Urinalysis    Vaginal exam       MEDICATIONS ORDERED:  Orders Placed This Encounter   Medications    hydrOXYzine (ATARAX) tablet 25 mg    fluconazole (DIFLUCAN) tablet 150 mg    miconazole (MICOTIN) 2 % cream       DDX:     Yeast infection, UTI, STI, gonorrhea chlamydia    Initial MDM/Plan: 31 y.o. female who presents with vaginal pruritus. DIAGNOSTIC RESULTS / EMERGENCYDEPARTMENT COURSE / MDM     LABS:  Results for orders placed or performed during the hospital encounter of 12/18/20   VAGINITIS DNA PROBE    Specimen: Vaginal   Result Value Ref Range    Specimen Description . VAGINA     Special Requests NOT REPORTED     Direct Exam POSITIVE for Candida sp. (A)     Direct Exam NEGATIVE for Gardnerella vaginalis     Direct Exam NEGATIVE for Trichomonas vaginalis     Direct Exam       Method of testing is a DNA probe intended for detection and identification of Candida species, Gardnerella vaginalis, and Trichomonas vaginalis nucleic acid in vaginal fluid specimens from patients with symptoms of vaginitis/vaginosis.    Urinalysis, reflex to microscopic   Result Value Ref Range    Color, UA YELLOW YELLOW    Turbidity UA CLOUDY (A) CLEAR    Glucose, Ur NEGATIVE NEGATIVE    Bilirubin Urine NEGATIVE NEGATIVE    Ketones, Urine NEGATIVE NEGATIVE    Specific Gravity, UA 1.024 1.005 - 1.030    Urine Hgb TRACE (A) NEGATIVE    pH, UA 7.0 5.0 - 8.0    Protein, UA TRACE (A) NEGATIVE    Urobilinogen, Urine Normal Normal    Nitrite, Urine NEGATIVE NEGATIVE    Leukocyte Esterase, Urine LARGE (A) NEGATIVE    Urinalysis Comments NOT REPORTED    Pregnancy, Urine   Result Value Ref Range    HCG(Urine) Pregnancy Test NEGATIVE NEGATIVE   Microscopic Urinalysis   Result Value Ref Range    -          WBC, UA 10 TO 20 0 - 5 /HPF    RBC, UA 2 TO 5 0 - 2 /HPF    Casts UA NOT REPORTED 0 - 2 /LPF    Crystals, UA NOT REPORTED None /HPF    Epithelial Cells UA 5 TO 10 0 - 5 /HPF    Renal Epithelial, UA NOT REPORTED 0 /HPF    Bacteria, UA FEW (A) None    Mucus, UA 1+ (A) None    Trichomonas, UA NOT REPORTED None    Amorphous, UA 1+ (A) None    Other Observations UA NOT REPORTED NOT REQ. Yeast, UA FEW (A) None       RADIOLOGY:  No orders to display         EMERGENCY DEPARTMENT COURSE:  ED Course as of Dec 18 1403   Fri Dec 18, 2020   1334 Patient seen and assessed in the emergency department no acute respiratory cardiovascular distress. Patient here with vaginal itch, no dysuria, no fevers or chills, no abdominal pain. States that she has not had any discharge but states that the vaginal itch has been there for 1 to 2 days, does not describe any erythema or swelling around the vaginal area but states that she does have symptoms of yeast as she has had this before. Patient denies headache, vision changes, nausea or vomiting.    [PS]   1339 HCG(Urine) Pregnancy Test: NEGATIVE [PS]      ED Course User Index  [PS] Kierra Farias MD          PROCEDURES:  None    CONSULTS:  None    CRITICAL CARE:  Please see attending note    FINAL IMPRESSION      1. Yeast infection          DISPOSITION / PLAN     DISPOSITION     discharge      PATIENT REFERRED TO:  No follow-up provider specified.     DISCHARGE MEDICATIONS:  New Prescriptions    No medications on file       Kierra Farias MD  Emergency Medicine Resident    (Please note that portions of this note were completed with a voice recognition program.Efforts were made to edit the dictations but occasionally words are mis-transcribed.)     Kierra Farias MD  Resident  12/18/20 7636       Kierra Farias MD  Resident  12/18/20 7608

## 2020-12-18 NOTE — ED PROVIDER NOTES
[address] ED     Emergency Department     Faculty Attestation        I performed a history and physical examination of the patient and discussed management with the resident. I reviewed the residents note and agree with the documented findings and plan of care. Any areas of disagreement are noted on the chart. I was personally present for the key portions of any procedures. I have documented in the chart those procedures where I was not present during the key portions. I have reviewed the emergency nurses triage note. I agree with the chief complaint, past medical history, past surgical history, allergies, medications, social and family history as documented unless otherwise noted below. For mid-level providers such as nurse practitioners as well as physicians assistants:    I have personally seen and evaluated the patient. I find the patient's history and physical exam are consistent with NP/PA documentation. I agree with the care provided, treatment rendered, disposition, & follow-up plan. Additional findings are as noted. Vital Signs: BP (!) 161/94   Pulse 80   Temp 97.7 °F (36.5 °C)   Resp 16   SpO2 99%   PCP:  No primary care provider on file. Pertinent Comments: With vaginal itching for the past 2 days. No fevers, chills or abdominal pain. She is otherwise afebrile nontoxic.       Critical Care  None          Haleigh Ramos MD  Attending Emergency Medicine Physician              Madhav Bryant MD  12/18/20 5567

## 2020-12-19 LAB
CULTURE: ABNORMAL
CULTURE: ABNORMAL
Lab: ABNORMAL
SPECIMEN DESCRIPTION: ABNORMAL

## 2021-03-23 ENCOUNTER — HOSPITAL ENCOUNTER (EMERGENCY)
Age: 31
Discharge: HOME | End: 2021-03-23
Attending: EMERGENCY MEDICINE
Payer: COMMERCIAL

## 2021-03-23 VITALS
DIASTOLIC BLOOD PRESSURE: 106 MMHG | TEMPERATURE: 97.8 F | OXYGEN SATURATION: 96 % | WEIGHT: 293 LBS | BODY MASS INDEX: 47.09 KG/M2 | SYSTOLIC BLOOD PRESSURE: 175 MMHG | HEIGHT: 66 IN | RESPIRATION RATE: 19 BRPM | HEART RATE: 88 BPM

## 2021-03-23 DIAGNOSIS — N93.9 ABNORMAL VAGINAL BLEEDING: Primary | ICD-10-CM

## 2021-03-23 LAB
-: ABNORMAL
ABSOLUTE EOS #: 0.07 K/UL (ref 0–0.44)
ABSOLUTE IMMATURE GRANULOCYTE: <0.03 K/UL (ref 0–0.3)
ABSOLUTE LYMPH #: 2.4 K/UL (ref 1.1–3.7)
ABSOLUTE MONO #: 0.63 K/UL (ref 0.1–1.2)
AMORPHOUS: ABNORMAL
BACTERIA: ABNORMAL
BASOPHILS # BLD: 1 % (ref 0–2)
BASOPHILS ABSOLUTE: 0.04 K/UL (ref 0–0.2)
BILIRUBIN URINE: NEGATIVE
CASTS UA: ABNORMAL /LPF (ref 0–8)
COLOR: YELLOW
COMMENT UA: ABNORMAL
CRYSTALS, UA: ABNORMAL /HPF
DIFFERENTIAL TYPE: ABNORMAL
DIRECT EXAM: ABNORMAL
EOSINOPHILS RELATIVE PERCENT: 1 % (ref 1–4)
EPITHELIAL CELLS UA: ABNORMAL /HPF (ref 0–5)
GLUCOSE URINE: NEGATIVE
HCG QUALITATIVE: NEGATIVE
HCG(URINE) PREGNANCY TEST: NEGATIVE
HCT VFR BLD CALC: 40.7 % (ref 36.3–47.1)
HEMOGLOBIN: 12.9 G/DL (ref 11.9–15.1)
IMMATURE GRANULOCYTES: 0 %
KETONES, URINE: NEGATIVE
LEUKOCYTE ESTERASE, URINE: NEGATIVE
LYMPHOCYTES # BLD: 27 % (ref 24–43)
Lab: ABNORMAL
MCH RBC QN AUTO: 27.4 PG (ref 25.2–33.5)
MCHC RBC AUTO-ENTMCNC: 31.7 G/DL (ref 28.4–34.8)
MCV RBC AUTO: 86.6 FL (ref 82.6–102.9)
MONOCYTES # BLD: 7 % (ref 3–12)
MUCUS: ABNORMAL
NITRITE, URINE: NEGATIVE
NRBC AUTOMATED: 0 PER 100 WBC
OTHER OBSERVATIONS UA: ABNORMAL
PDW BLD-RTO: 16.1 % (ref 11.8–14.4)
PH UA: 7 (ref 5–8)
PLATELET # BLD: 238 K/UL (ref 138–453)
PLATELET ESTIMATE: ABNORMAL
PMV BLD AUTO: 10.6 FL (ref 8.1–13.5)
PROTEIN UA: ABNORMAL
RBC # BLD: 4.7 M/UL (ref 3.95–5.11)
RBC # BLD: ABNORMAL 10*6/UL
RBC UA: ABNORMAL /HPF (ref 0–4)
RENAL EPITHELIAL, UA: ABNORMAL /HPF
SEG NEUTROPHILS: 64 % (ref 36–65)
SEGMENTED NEUTROPHILS ABSOLUTE COUNT: 5.72 K/UL (ref 1.5–8.1)
SPECIFIC GRAVITY UA: 1.03 (ref 1–1.03)
SPECIMEN DESCRIPTION: ABNORMAL
TRICHOMONAS: ABNORMAL
TURBIDITY: CLEAR
URINE HGB: ABNORMAL
UROBILINOGEN, URINE: NORMAL
WBC # BLD: 8.9 K/UL (ref 3.5–11.3)
WBC # BLD: ABNORMAL 10*3/UL
WBC UA: ABNORMAL /HPF (ref 0–5)
YEAST: ABNORMAL

## 2021-03-23 PROCEDURE — 87591 N.GONORRHOEAE DNA AMP PROB: CPT

## 2021-03-23 PROCEDURE — 85025 COMPLETE CBC W/AUTO DIFF WBC: CPT

## 2021-03-23 PROCEDURE — 87510 GARDNER VAG DNA DIR PROBE: CPT

## 2021-03-23 PROCEDURE — 87491 CHLMYD TRACH DNA AMP PROBE: CPT

## 2021-03-23 PROCEDURE — 81001 URINALYSIS AUTO W/SCOPE: CPT

## 2021-03-23 PROCEDURE — 81025 URINE PREGNANCY TEST: CPT

## 2021-03-23 PROCEDURE — 99285 EMERGENCY DEPT VISIT HI MDM: CPT

## 2021-03-23 PROCEDURE — 84703 CHORIONIC GONADOTROPIN ASSAY: CPT

## 2021-03-23 PROCEDURE — 87660 TRICHOMONAS VAGIN DIR PROBE: CPT

## 2021-03-23 PROCEDURE — 87480 CANDIDA DNA DIR PROBE: CPT

## 2021-03-23 PROCEDURE — 6370000000 HC RX 637 (ALT 250 FOR IP): Performed by: STUDENT IN AN ORGANIZED HEALTH CARE EDUCATION/TRAINING PROGRAM

## 2021-03-23 RX ORDER — IBUPROFEN 800 MG/1
800 TABLET ORAL ONCE
Status: COMPLETED | OUTPATIENT
Start: 2021-03-23 | End: 2021-03-23

## 2021-03-23 RX ORDER — IBUPROFEN 800 MG/1
800 TABLET ORAL EVERY 8 HOURS PRN
Qty: 21 TABLET | Refills: 0 | Status: SHIPPED | OUTPATIENT
Start: 2021-03-23 | End: 2021-06-21

## 2021-03-23 RX ORDER — IBUPROFEN 800 MG/1
800 TABLET ORAL EVERY 8 HOURS PRN
Qty: 21 TABLET | Refills: 0 | Status: SHIPPED | OUTPATIENT
Start: 2021-03-23 | End: 2021-03-23 | Stop reason: SDUPTHER

## 2021-03-23 RX ADMIN — IBUPROFEN 800 MG: 800 TABLET, FILM COATED ORAL at 19:02

## 2021-03-23 NOTE — ED PROVIDER NOTES
[facility]  Emergency Department  Faculty Attestation     I performed a history and physical examination of the patient and discussed management with the resident. I reviewed the residents note and agree with the documented findings and plan of care. Any areas of disagreement are noted on the chart. I was personally present for the key portions of any procedures. I have documented in the chart those procedures where I was not present during the key portions. I have reviewed the emergency nurses triage note. I agree with the chief complaint, past medical history, past surgical history, allergies, medications, social and family history as documented unless otherwise noted below. For Physician Assistant/ Nurse Practitioner cases/documentation I have personally evaluated this patient and have completed at least one if not all key elements of the E/M (history, physical exam, and MDM). Additional findings are as noted. Primary Care Physician:  No primary care provider on file. Screenings:  [unfilled]    CHIEF COMPLAINT       Chief Complaint   Patient presents with    Vaginal Bleeding     intermittent x2 weeks with clots. denies discharge or urinary s/s       RECENT VITALS:   Temp: 97.8 °F (36.6 °C),  Pulse: 102, Resp: 19, BP: (!) 175/106    LABS:  Labs Reviewed   VAGINITIS DNA PROBE - Abnormal; Notable for the following components:       Result Value    Direct Exam POSITIVE for Gardnerella vaginalis. (*)     All other components within normal limits   CBC WITH AUTO DIFFERENTIAL - Abnormal; Notable for the following components:    RDW 16.1 (*)     All other components within normal limits   C.TRACHOMATIS N.GONORRHOEAE DNA   URINE RT REFLEX TO CULTURE   PREGNANCY, URINE   HCG, SERUM, QUALITATIVE       Radiology  No orders to display         Attending Physician Additional  Notes    Has vaginal bleeding for the past 1-1/2 weeks.   There is an intermittent stabbing pain in the left pelvic region that is not severe. Occasional clots. No concern for pregnancy. Minimal lightheadedness. On exam she appears comfortable afebrile vital signs are normal.  No obvious pallor. Impression is menorrhagia possible anovulatory cycle rule out pregnancy or ectopic. Plan is CBC, hCG, reassess, dissipate discharge on Motrin with follow-up. Efe Cisneros.  Heather Schaefer MD, [facility]  Attending Emergency  Physician               Anastasiya Reinoso MD  03/23/21 3502

## 2021-03-23 NOTE — ED PROVIDER NOTES
[facility] ED  [place] rgency Department Encounter  EmergencyMedicine Resident     Pt Name:Quyen Jacobs  MRN: [id number]  [place] 1990  Date of evaluation: 3/23/21  PCP:  No primary care provider on file. CHIEF COMPLAINT       Chief Complaint   Patient presents with    Vaginal Bleeding     intermittent x2 weeks with clots. denies discharge or urinary s/s       HISTORY OF PRESENT ILLNESS  (Location/Symptom, Timing/Onset, Context/Setting, Quality, Duration, Modifying Factors, Severity.)      Prakash Montgomery is a 31 y.o. female who presents with . Patient with no significant medical history presents with a week and half of moderate to heavy vaginal bleeding. States her period started a week and a half ago typically last 3 to 4 days, however she is persistently bleeding going through approximately 6 pads a day, occasionally passing larger quarter sized clots, she is sexually active no barrier control, she has had 2 prior full-term pregnancies with 1 miscarriage, she has no vaginal irritation or discharge, no lesions, she does have some urinary frequency however no dysuria or hematuria. She has no abdominal pain no nausea vomiting tolerating oral intake no fevers or chills  . PAST MEDICAL / SURGICAL / SOCIAL / FAMILY HISTORY      has no past medical history on file. has a past surgical history that includes [place] tooth extraction (Left, 2014).     Social History     Socioeconomic History    Marital status: Single     Spouse name: Not on file    Number of children: Not on file    Years of education: Not on file    Highest education level: Not on file   Occupational History    Not on file   Social Needs    Financial resource strain: Not on file    Food insecurity     Worry: Not on file     Inability: Not on file    Transportation needs     Medical: Not on file     Non-medical: Not on file   Tobacco Use    Smoking status: Current Every Day Smoker     Types: Cigars Start date: 2003    Smokeless tobacco: Never Used    Tobacco comment: 1-2 black and milds daily   Substance and Sexual Activity    Alcohol use: Yes     Comment: Occasionally     Drug use: Yes     Types: Marijuana     Comment: daily use    Sexual activity: Yes     Partners: Male     Birth control/protection: I.U.D. Lifestyle    Physical activity     Days per week: Not on file     Minutes per session: Not on file    Stress: Not on file   Relationships    Social connections     Talks on phone: Not on file     Gets together: Not on file     Attends Hoahaoism service: Not on file     Active member of club or organization: Not on file     Attends meetings of clubs or organizations: Not on file     Relationship status: Not on file    Intimate partner violence     Fear of current or ex partner: Not on file     Emotionally abused: Not on file     Physically abused: Not on file     Forced sexual activity: Not on file   Other Topics Concern    Not on file   Social History Narrative    Not on file       Family History   Problem Relation Age of Onset    Diabetes Mother        Allergies:  Patient has no known allergies. Home Medications:  Prior to Admission medications    Medication Sig Start Date End Date Taking? Authorizing Provider   ibuprofen (IBU) 800 MG tablet Take 1 tablet by mouth every 8 hours as needed for Pain (Vaginal bleeding) 3/23/21  Yes Tee Mueller,    miconazole (MICOTIN) 2 % cream Apply topically 2 times daily.  12/18/20   Sherice Lu MD   ondansetron (ZOFRAN) 4 MG tablet Take 1 tablet by mouth every 8 hours as needed for Nausea or Vomiting 11/26/19   SHEILA Gibson - CNP   acetaminophen (TYLENOL) 500 MG tablet Take 2 tablets by mouth every 6 hours as needed for Pain 11/26/19   SHEILA Isaacs - CNP   ibuprofen (ADVIL;MOTRIN) 600 MG tablet Take 1 tablet by mouth every 6 hours as needed for Pain 2/9/19   Johnny Carlson, DO       REVIEW OF SYSTEMS    (2-9 systems for level 4, 10 or more for level 5)      General ROS - No fevers, No chills, no gradual weight loss, no night sweats  Ophthalmic ROS - No discharge, No changes in vision  ENT ROS -  No sore throat, No rhinorrhea,   Respiratory ROS - no shortness of breath, no cough, no  wheezing  Cardiovascular ROS - No chest pain, no dyspnea on exertion  Gastrointestinal ROS - No abdominal pain, no nausea, no vomiting, no change in bowel habits, no black or bloody stools  Genito-Urinary ROS - No dysuria, trouble voiding, or hematuria  Musculoskeletal ROS - No myalgias, No arthalgias  Neurological ROS - No headache, positive dizziness/lightheadedness, No focal weakness, no loss of sensation  Dermatological ROS - No lesions, No rash         PHYSICAL EXAM   (up to 7 for level 4, 8 or more for level 5)      INITIAL VITALS:   BP (!) 175/106   Pulse 88   Temp 97.8 °F (36.6 °C) (Oral)   Resp 19   Ht 5' 6\" (1.676 m)   Wt (!) 420 lb (190.5 kg)   LMP 03/23/2021   SpO2 96%   BMI 67.79 kg/m²     General Appearance: Well-appearing, in no acute distress  HEENT: Head: normocephalic/atraumatic eyes: PERRLA, EOMT, conjunctiva not injected, sclerae nonicteric ears: External canals patent nose: Nares patent, no rhinorrhea, throat:mucous membranes moist, oropharynx clear     Neck: Trachea midline, no JVD. Lungs: No evidence of increased work of breathing. CTA B/L, no wheezes/rhonchi     Cardiovascular: RRR, no murmur, 2+ peripheral pulses bilaterally. Cap refill less than 2 seconds.   No lower extremity edema noted      Patient does have hypertension otherwise nontoxic-appearing, she is obese, mucous membranes moist, mucous membranes show good color no signs of overt anemia, abdomen soft nontender       exam chaperoned by female nurse, there is no vaginal external lesions there is no lacerations to the vaginal wall, closed cervix with slow oozing dark blood present, swabs were sent      Neurologic: CEDENO  to person, place, time, and event. No sensation deficits. Moving all extremities    Extremities: Skin warm, dry and intact. DIFFERENTIAL  DIAGNOSIS     PLAN (LABS / IMAGING / EKG):  Orders Placed This Encounter   Procedures    VAGINITIS DNA PROBE    C.trachomatis N.gonorrhoeae DNA    CBC WITH AUTO DIFFERENTIAL    Urinalysis Reflex to Culture    PREGNANCY, URINE    HCG Qualitative, Serum    Microscopic Urinalysis    Vaginal exam       MEDICATIONS ORDERED:  Orders Placed This Encounter   Medications    ibuprofen (ADVIL;MOTRIN) tablet 800 mg    DISCONTD: ibuprofen (IBU) 800 MG tablet     Sig: Take 1 tablet by mouth every 8 hours as needed for Pain (Vaginal bleeding)     Dispense:  21 tablet     Refill:  0    ibuprofen (IBU) 800 MG tablet     Sig: Take 1 tablet by mouth every 8 hours as needed for Pain (Vaginal bleeding)     Dispense:  21 tablet     Refill:  0           DIAGNOSTIC RESULTS / EMERGENCY DEPARTMENT COURSE / MDM     LABS:  Results for orders placed or performed during the hospital encounter of 03/23/21   VAGINITIS DNA PROBE    Specimen: Vaginal   Result Value Ref Range    Specimen Description . VAGINA     Special Requests NOT REPORTED     Direct Exam POSITIVE for Gardnerella vaginalis. (A)     Direct Exam NEGATIVE for Candida sp. Direct Exam NEGATIVE for Trichomonas vaginalis     Direct Exam       Method of testing is a DNA probe intended for detection and identification of Candida species, Gardnerella vaginalis, and Trichomonas vaginalis nucleic acid in vaginal fluid specimens from patients with symptoms of vaginitis/vaginosis.    CBC WITH AUTO DIFFERENTIAL   Result Value Ref Range    WBC 8.9 3.5 - 11.3 k/uL    RBC 4.70 3.95 - 5.11 m/uL    Hemoglobin 12.9 11.9 - 15.1 g/dL    Hematocrit 40.7 36.3 - 47.1 %    MCV 86.6 82.6 - 102.9 fL    MCH 27.4 25.2 - 33.5 pg    MCHC 31.7 28.4 - 34.8 g/dL    RDW 16.1 (H) 11.8 - 14.4 %    Platelets 621 644 - 551 k/uL    MPV 10.6 8.1 - 13.5 fL    NRBC Automated 0.0 0.0 per 100 WBC Physician who either signs or Co-signs this chart in the absence of a cardiologist.    EMERGENCY DEPARTMENT COURSE/IMPRESSION:    Patient with no significant medical history presents with a week and half of moderate to heavy vaginal bleeding. States her period started a week and a half ago typically last 3 to 4 days, however she is persistently bleeding going through approximately 6 pads a day, occasionally passing larger quarter sized clots, she is sexually active no barrier control, she has had 2 prior full-term pregnancies with 1 miscarriage, she has no vaginal irritation or discharge, no lesions, she does have some urinary frequency however no dysuria or hematuria. She has no abdominal pain no nausea vomiting tolerating oral intake no fevers or chills      Patient with vaginal bleeding, primary concern would be pregnancy, missed , uterine mass, possible dysfunctional uterine bleeding or endometriosis. Patient stable walking with no distress, however given mild shortness of breath and duration will check H&H for anemia, will check pregnancy test UA we will send vaginal swabs for infectious work-up, pregnancy test negative will put patient on Motrin and have OB close follow-up for further care, and likely formal ultrasound    ED Course as of Mar 24 0145   Tue Mar 23, 2021   1702 Hemoglobin Quant: 12.9 [EF]   [place].(!): POSITIVE for Gardnerella vaginalis. [EF]      ED Course User Index  [EF] Diandra Cease, DO       PROCEDURES:  None    CONSULTS:  None    CRITICAL CARE:  None    FINAL IMPRESSION      1.  Abnormal vaginal bleeding          DISPOSITION / PLAN     DISPOSITION Decision To Discharge 2021 06:56:53 PM      PATIENT REFERRED TO:  [facility]  [facility]  [phone]  Schedule an appointment as soon as possible for a visit in 2 days  follow up for further care and testing for vaginal bleeidng    [facility]  [address] [place] [address] [id number]  [phone]  Go to   If symptoms worsen      DISCHARGE MEDICATIONS:  Discharge Medication List as of 3/23/2021  6:56 PM          DO Merced Lazo D.O.   Emergency Medicine Resident    (Please note that portions of this note were completed with a voice recognition program.  Efforts were made to edit the dictations but occasionally words aremis-transcribed.)       Lia Singh DO  Resident  03/24/21 0145

## 2021-04-20 ENCOUNTER — TELEPHONE (OUTPATIENT)
Dept: OBGYN | Age: 31
End: 2021-04-20

## 2021-04-20 NOTE — TELEPHONE ENCOUNTER
Patient no showed new patient appt with Dr. Barrington Jose. Writer called and spoke with patient and she hanged up mid conversion. Writer attempted to call the patient back x3 and she did not .

## 2021-06-21 ENCOUNTER — OFFICE VISIT (OUTPATIENT)
Dept: OBGYN CLINIC | Age: 31
End: 2021-06-21
Payer: COMMERCIAL

## 2021-06-21 ENCOUNTER — HOSPITAL ENCOUNTER (OUTPATIENT)
Age: 31
Setting detail: SPECIMEN
Discharge: HOME | End: 2021-06-21
Payer: COMMERCIAL

## 2021-06-21 VITALS
SYSTOLIC BLOOD PRESSURE: 112 MMHG | HEIGHT: 66 IN | WEIGHT: 293 LBS | DIASTOLIC BLOOD PRESSURE: 78 MMHG | HEART RATE: 100 BPM | BODY MASS INDEX: 47.09 KG/M2

## 2021-06-21 DIAGNOSIS — N92.6 IRREGULAR MENSES: ICD-10-CM

## 2021-06-21 DIAGNOSIS — R39.9 UTI SYMPTOMS: ICD-10-CM

## 2021-06-21 DIAGNOSIS — Z01.411 ENCOUNTER FOR GYNECOLOGICAL EXAMINATION WITH ABNORMAL FINDING: Primary | ICD-10-CM

## 2021-06-21 DIAGNOSIS — N76.0 ACUTE VAGINITIS: ICD-10-CM

## 2021-06-21 PROBLEM — F32.A DEPRESSION: Status: ACTIVE | Noted: 2017-05-01

## 2021-06-21 PROBLEM — N91.1 SECONDARY AMENORRHEA: Status: ACTIVE | Noted: 2017-06-07

## 2021-06-21 LAB
ABSOLUTE EOS #: 0.1 K/UL (ref 0–0.4)
ABSOLUTE IMMATURE GRANULOCYTE: ABNORMAL K/UL (ref 0–0.3)
ABSOLUTE LYMPH #: 2 K/UL (ref 1–4.8)
ABSOLUTE MONO #: 0.6 K/UL (ref 0.1–1.3)
BASOPHILS # BLD: 1 % (ref 0–2)
BASOPHILS ABSOLUTE: 0.1 K/UL (ref 0–0.2)
DIFFERENTIAL TYPE: ABNORMAL
DIRECT EXAM: ABNORMAL
EOSINOPHILS RELATIVE PERCENT: 1 % (ref 0–4)
HCG QUANTITATIVE: <1 IU/L
HCT VFR BLD CALC: 36.4 % (ref 36–46)
HEMOGLOBIN: 11.6 G/DL (ref 12–16)
IMMATURE GRANULOCYTES: ABNORMAL %
INR BLD: 1
LYMPHOCYTES # BLD: 25 % (ref 24–44)
Lab: ABNORMAL
MCH RBC QN AUTO: 27 PG (ref 26–34)
MCHC RBC AUTO-ENTMCNC: 32 G/DL (ref 31–37)
MCV RBC AUTO: 84.3 FL (ref 80–100)
MONOCYTES # BLD: 7 % (ref 1–7)
NRBC AUTOMATED: ABNORMAL PER 100 WBC
PARTIAL THROMBOPLASTIN TIME: 26.6 SEC (ref 24–36)
PDW BLD-RTO: 16.2 % (ref 11.5–14.9)
PLATELET # BLD: 249 K/UL (ref 150–450)
PLATELET ESTIMATE: ABNORMAL
PMV BLD AUTO: 10.2 FL (ref 6–12)
PROTHROMBIN TIME: 12.8 SEC (ref 11.8–14.6)
RBC # BLD: 4.32 M/UL (ref 4–5.2)
RBC # BLD: ABNORMAL 10*6/UL
SEG NEUTROPHILS: 66 % (ref 36–66)
SEGMENTED NEUTROPHILS ABSOLUTE COUNT: 5.2 K/UL (ref 1.3–9.1)
SPECIMEN DESCRIPTION: ABNORMAL
TSH SERPL DL<=0.05 MIU/L-ACNC: 1.88 MIU/L (ref 0.3–5)
WBC # BLD: 7.9 K/UL (ref 3.5–11)
WBC # BLD: ABNORMAL 10*3/UL

## 2021-06-21 PROCEDURE — 88175 CYTOPATH C/V AUTO FLUID REDO: CPT

## 2021-06-21 PROCEDURE — 87660 TRICHOMONAS VAGIN DIR PROBE: CPT

## 2021-06-21 PROCEDURE — 87480 CANDIDA DNA DIR PROBE: CPT

## 2021-06-21 PROCEDURE — 87510 GARDNER VAG DNA DIR PROBE: CPT

## 2021-06-21 PROCEDURE — 87591 N.GONORRHOEAE DNA AMP PROB: CPT

## 2021-06-21 PROCEDURE — 84443 ASSAY THYROID STIM HORMONE: CPT

## 2021-06-21 PROCEDURE — 87624 HPV HI-RISK TYP POOLED RSLT: CPT

## 2021-06-21 PROCEDURE — 76856 US EXAM PELVIC COMPLETE: CPT | Performed by: OBSTETRICS & GYNECOLOGY

## 2021-06-21 PROCEDURE — 87491 CHLMYD TRACH DNA AMP PROBE: CPT

## 2021-06-21 PROCEDURE — 85610 PROTHROMBIN TIME: CPT

## 2021-06-21 PROCEDURE — 99385 PREV VISIT NEW AGE 18-39: CPT | Performed by: NURSE PRACTITIONER

## 2021-06-21 PROCEDURE — 85025 COMPLETE CBC W/AUTO DIFF WBC: CPT

## 2021-06-21 PROCEDURE — 85730 THROMBOPLASTIN TIME PARTIAL: CPT

## 2021-06-21 PROCEDURE — 36415 COLL VENOUS BLD VENIPUNCTURE: CPT

## 2021-06-21 PROCEDURE — 76830 TRANSVAGINAL US NON-OB: CPT | Performed by: OBSTETRICS & GYNECOLOGY

## 2021-06-21 PROCEDURE — 84702 CHORIONIC GONADOTROPIN TEST: CPT

## 2021-06-21 ASSESSMENT — PATIENT HEALTH QUESTIONNAIRE - PHQ9
SUM OF ALL RESPONSES TO PHQ QUESTIONS 1-9: 0
SUM OF ALL RESPONSES TO PHQ QUESTIONS 1-9: 0
2. FEELING DOWN, DEPRESSED OR HOPELESS: 0
1. LITTLE INTEREST OR PLEASURE IN DOING THINGS: 0
SUM OF ALL RESPONSES TO PHQ9 QUESTIONS 1 & 2: 0
SUM OF ALL RESPONSES TO PHQ QUESTIONS 1-9: 0
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
## 2021-06-22 ENCOUNTER — TELEPHONE (OUTPATIENT)
Dept: OBGYN CLINIC | Age: 31
End: 2021-06-22

## 2021-06-22 RX ORDER — METRONIDAZOLE 500 MG/1
500 TABLET ORAL 2 TIMES DAILY
Qty: 14 TABLET | Refills: 0 | Status: SHIPPED | OUTPATIENT
Start: 2021-06-22 | End: 2021-06-28 | Stop reason: SDUPTHER

## 2021-06-22 NOTE — TELEPHONE ENCOUNTER
Per Leonie Saini pt notified of pelvic US results and pt scheduled for follow up with Physician in office regarding results and recommendations.

## 2021-06-22 NOTE — TELEPHONE ENCOUNTER
----- Message from SHEILA Vieira CNP sent at 6/22/2021  7:41 AM EDT -----  +BV- flagyl 500mg pO BID X 7 days

## 2021-06-22 NOTE — TELEPHONE ENCOUNTER
----- Message from SHEILA Pratt CNP sent at 6/22/2021  1:02 PM EDT -----  Thickened endometrial lining. Recommend GYN follow up with physician and endometrial sampling with hysteroscopic evaluation.   Please let patient know and schedule follow up

## 2021-06-23 LAB
HPV SAMPLE: NORMAL
HPV, GENOTYPE 16: NOT DETECTED
HPV, GENOTYPE 18: NOT DETECTED
HPV, HIGH RISK OTHER: NOT DETECTED
HPV, INTERPRETATION: NORMAL
SPECIMEN DESCRIPTION: NORMAL

## 2021-06-28 ENCOUNTER — TELEPHONE (OUTPATIENT)
Dept: OBGYN CLINIC | Age: 31
End: 2021-06-28

## 2021-06-28 RX ORDER — METRONIDAZOLE 500 MG/1
500 TABLET ORAL 2 TIMES DAILY
Qty: 14 TABLET | Refills: 0 | Status: SHIPPED | OUTPATIENT
Start: 2021-06-28 | End: 2021-07-05

## 2021-06-28 NOTE — TELEPHONE ENCOUNTER
Patient states her Flagyl was called to [place] and it was supposed to go to [facility]. She stated when she called [facility] they wouldn't send it to [address] [place] [address] for her,they told her to call here.

## 2021-06-29 LAB — CYTOLOGY REPORT: NORMAL

## 2021-06-30 ENCOUNTER — TELEPHONE (OUTPATIENT)
Dept: OBGYN CLINIC | Age: 31
End: 2021-06-30

## 2021-06-30 NOTE — TELEPHONE ENCOUNTER
Patient was notified of insufficient pap smear results, patient has appointment 7/1 in office for a follow up with inna Rebolledo to repeat pap smear at that time.

## 2021-07-01 ENCOUNTER — OFFICE VISIT (OUTPATIENT)
Dept: OBGYN CLINIC | Age: 31
End: 2021-07-01
Payer: COMMERCIAL

## 2021-07-01 ENCOUNTER — HOSPITAL ENCOUNTER (OUTPATIENT)
Age: 31
Setting detail: SPECIMEN
Discharge: HOME | End: 2021-07-01
Payer: COMMERCIAL

## 2021-07-01 VITALS
SYSTOLIC BLOOD PRESSURE: 146 MMHG | HEIGHT: 66 IN | BODY MASS INDEX: 47.09 KG/M2 | WEIGHT: 293 LBS | DIASTOLIC BLOOD PRESSURE: 80 MMHG

## 2021-07-01 DIAGNOSIS — R87.615 ENCOUNTER FOR REPEAT PAP SMEAR DUE TO PREVIOUS INSUFFICIENT CERVICAL CELLS: Primary | ICD-10-CM

## 2021-07-01 DIAGNOSIS — N93.9 ABNORMAL UTERINE BLEEDING: ICD-10-CM

## 2021-07-01 DIAGNOSIS — R39.9 URINARY TRACT INFECTION SYMPTOMS: ICD-10-CM

## 2021-07-01 LAB
BILIRUBIN URINE: NEGATIVE
COLOR: YELLOW
COMMENT UA: NORMAL
GLUCOSE URINE: NEGATIVE
KETONES, URINE: NEGATIVE
LEUKOCYTE ESTERASE, URINE: NEGATIVE
NITRITE, URINE: NEGATIVE
PH UA: 6 (ref 5–8)
PROTEIN UA: NEGATIVE
SPECIFIC GRAVITY UA: 1.02 (ref 1–1.03)
TURBIDITY: CLEAR
URINE HGB: NEGATIVE
UROBILINOGEN, URINE: NORMAL

## 2021-07-01 PROCEDURE — 88175 CYTOPATH C/V AUTO FLUID REDO: CPT

## 2021-07-01 PROCEDURE — 99214 OFFICE O/P EST MOD 30 MIN: CPT | Performed by: OBSTETRICS & GYNECOLOGY

## 2021-07-01 PROCEDURE — 4004F PT TOBACCO SCREEN RCVD TLK: CPT | Performed by: OBSTETRICS & GYNECOLOGY

## 2021-07-01 PROCEDURE — 87624 HPV HI-RISK TYP POOLED RSLT: CPT

## 2021-07-01 PROCEDURE — G8427 DOCREV CUR MEDS BY ELIG CLIN: HCPCS | Performed by: OBSTETRICS & GYNECOLOGY

## 2021-07-01 PROCEDURE — G8417 CALC BMI ABV UP PARAM F/U: HCPCS | Performed by: OBSTETRICS & GYNECOLOGY

## 2021-07-01 PROCEDURE — 81003 URINALYSIS AUTO W/O SCOPE: CPT

## 2021-07-01 NOTE — PROGRESS NOTES
Sanford Verma  2021    YOB: 1990          The patient was seen today. She is here regarding follow up on labs/ sono. Pt states her periods were irregular x 6 weeks now has stopped bleeding. Pt wishes to go on OCs to regulate her periods. Pt has had IUD previously and did not like it. Pt was counseled on risks/ benefits/ alternative options. Pts sono with slightly thickened endometrial lining- pt just stopped bleeding. Pt wishes to cycle with OCs and repeat her sono in 3 months and if still thickened will proceed with endometrial biopsy. Her bowels are regular and she is voiding without difficulty.      HPI:  Sanford Verma is a 31 y.o. female [id number]       OB History    Para Term  AB Living   4 3 3 0 1 2   SAB TAB Ectopic Molar Multiple Live Births   1 0 0 0 0 2      # Outcome Date GA Lbr Rodger/2nd Weight Sex Delivery Anes PTL Lv   4 SAB 16 10w0d    OTHER   ND      Birth Comments: Cytotec    3 Term 09 39w3d  7 lb 8 oz (3.402 kg) F Vag-Spont EPI  REED      Name: Amaury Arvizu   2 Term 08 40w0d  6 lb 15 oz (3.147 kg) F Vag-Spont EPI N REED      Birth Comments: second degree lac      Name: Dylan Baez: 9  Apgar5: 9   1 Term               Obstetric Comments   Same FOB       Past Medical History:   Diagnosis Date    Depression 2017       Past Surgical History:   Procedure Laterality Date    WISDOM TOOTH EXTRACTION Left        Family History   Problem Relation Age of Onset    Diabetes Mother        Social History     Socioeconomic History    Marital status: Single     Spouse name: Not on file    Number of children: Not on file    Years of education: Not on file    Highest education level: Not on file   Occupational History    Not on file   Tobacco Use    Smoking status: Current Every Day Smoker     Types: Cigars     Start date:     Smokeless tobacco: Never Used    Tobacco comment: 1-2 black and milds daily   Vaping Use    Vaping Use: Never used   Substance and Sexual Activity    Alcohol use: Yes     Comment: Occasionally     Drug use: Not Currently     Types: Marijuana     Comment: daily use    Sexual activity: Yes     Partners: Male     Birth control/protection: I.U.D. Other Topics Concern    Not on file   Social History Narrative    Not on file     Social Determinants of Health     Financial Resource Strain:     Difficulty of Paying Living Expenses:    Food Insecurity:     Worried About Running Out of Food in the Last Year:     [address] in the Last Year:    Transportation Needs:     Lack of Transportation (Medical):  Lack of Transportation (Non-Medical):    Physical Activity:     Days of Exercise per Week:     Minutes of Exercise per Session:    Stress:     Feeling of Stress :    Social Connections:     Frequency of Communication with Friends and Family:     Frequency of Social Gatherings with Friends and Family:     Attends Jewish Services:     Active Member of Clubs or Organizations:     Attends Club or Organization Meetings:     Marital Status:    Intimate Partner Violence:     Fear of Current or Ex-Partner:     Emotionally Abused:     Physically Abused:     Sexually Abused:          MEDICATIONS:  Current Outpatient Medications   Medication Sig Dispense Refill    norethindrone-ethinyl estradiol-Fe (LO LOESTRIN FE) 1 MG-10 MCG / 10 MCG tablet Take 1 tablet by mouth daily 28 tablet 3    ibuprofen (ADVIL;MOTRIN) 600 MG tablet Take 1 tablet by mouth every 6 hours as needed for Pain 30 tablet 0    metroNIDAZOLE (FLAGYL) 500 MG tablet Take 1 tablet by mouth 2 times daily for 7 days (Patient not taking: Reported on 7/1/2021) 14 tablet 0     No current facility-administered medications for this visit. ALLERGIES:  Allergies as of 07/01/2021    (No Known Allergies)         REVIEW OF SYSTEMS:       A minimum of an eleven point review of systems was completed.     Review Of Systems (11 point):  Constitutional: No fever, chills or malaise; No weight change or fatigue  Head and Eyes: No vision, Headache, Dizziness or trauma in last 12 months  ENT ROS: No hearing, Tinnitis, sinus or taste problems  Hematological and Lymphatic ROS:No Lymphoma, Von Willebrand's, Hemophillia or Bleeding History  Psych ROS: No Depression, Homicidal thoughts,suicidal thoughts, or anxiety  Breast ROS: No prior breast abnormalities or lumps  Respiratory ROS: No SOB, Pneumoniae,Cough, or Pulmonary Embolism History  Cardiovascular ROS: No Chest Pain with Exertion, Palpitations, Syncope, Edema, Arrhythmia  Gastrointestinal ROS: No Indigestion, Heartburn, Nausea, vomiting, Diarrhea, Constipation,or Bowel Changes; No Bloody Stools or melena  Genito-Urinary ROS: No Dysuria, Hematuria or Nocturia. No Urinary Incontinence or Vaginal Discharge  Musculoskeletal ROS: No Arthralgia, Arthritis,Gout,Osteoporosis or Rheumatism  Neurological ROS: No CVA, Migraines, Epilepsy, Seizure Hx, or Limb Weakness  Dermatological ROS: No Rash, Itching, Hives, Mole Changes or Cancer          Blood pressure (!) 146/80, height 5' 6\" (1.676 m), weight (!) 438 lb (198.7 kg), not currently breastfeeding. Chaperone for Intimate Exam   Chaperone was offered and accepted as part of the rooming process.  Chaperone: Melania         Abdomen: Soft non-tender; good bowel sounds. No guarding, rebound or rigidity. No CVA tenderness bilaterally. Extremities: No calf tenderness, DTR 2/4, and No edema bilaterally    Pelvic: Vulva and vagina appear normal. Bimanual exam reveals normal uterus and adnexa. Diagnostics:  US NON OB TRANSVAGINAL    Result Date: 6/21/2021  GYNECOLOGY ULTRASOUND REPORT [facility] [address]; [address] [phone] mn [phone] Fax 6/22/2021 MRN: [id number] Contact Serial #: [id number] Eren Wood YOB: 1990 Age: 31 y.o. The ultrasound images were reviewed.  Please see the attached ultrasound report. Ultrasonographer: Amanda Pet[facility] Assessment: Nathaly Wilson is a 31 y.o. female Irregular menses Specific Ultrasound Imaging Obtained: Transabdominal Approach: Yes Transvaginal Approach: Yes Limitations of Study Encountered: Overlying bowel & gas limiting the study: No Poor prep for procedure limiting study: Yes Elevated BMI limiting study: Yes Ovaries are NOT seen on Transabdominal imaging. Transvaginal imaging required: No Findings: 1. The Uterus is heterogeneous and anteverted (8.83 x 6.51 x 4.88 cm) 2. The Endometrial Stripe measurement is 1.16 cm=THICKENED 3. The Left Ovary is without masses or cysts 4. The Right Ovary is without masses or cysts 5. There is not an abnormal amount of cul-de-sac fluid Electronically signed by Carl Rivera DO on 6/22/21 at 12:26 PM EDT     1. The Uterus is heterogeneous and anteverted (8.83 x 6.51 x 4.88 cm) 2. The Endometrial Stripe measurement is 1.16 cm=THICKENED 3. The Left Ovary is without masses or cysts 4. The Right Ovary is without masses or cysts 5. There is not an abnormal amount of cul-de-sac fluid Recommendations: 1. GYN Follow up 2. Endometrial sampling with hysteroscopic evaluation    US PELVIS COMPLETE    Result Date: 6/21/2021  See transvaginal ultrasound report from same day      Lab Results:  Results for orders placed or performed during the hospital encounter of 06/21/21   C.trachomatis N.gonorrhoeae DNA    Specimen: Cervix   Result Value Ref Range    Specimen Description . CERVIX     C. trachomatis DNA NEGATIVE NEGATIVE    N. gonorrhoeae DNA NEGATIVE NEGATIVE   VAGINITIS DNA PROBE    Specimen: Vaginal   Result Value Ref Range    Specimen Description . VAGINAL SWAB     Special Requests NOT REPORTED     Direct Exam POSITIVE for Gardnerella vaginalis. (A)     Direct Exam NEGATIVE for Candida sp.      Direct Exam NEGATIVE for Trichomonas vaginalis     Direct Exam       Method of testing is a DNA probe intended for detection and identification of Candida species, Gardnerella vaginalis, and Trichomonas vaginalis nucleic acid in vaginal fluid specimens from patients with symptoms of vaginitis/vaginosis. GYN Cytology   Result Value Ref Range    Cytology Report       INTERPRETATION    Cervical material, (ThinPrep vial, Imaging-assisted review):  Specimen Adequacy:       Unsatisfactory for evaluation. Specimen processed and examined but unsatisfactory   for evaluation of epithelial abnormality due to:       - Scant cellularity. Descriptive Diagnosis:       Interpretation not possible due to unsatisfactory specimen  adequacy. Comments:       High Risk HPV testing was ordered. Cytotechnologist:   VICTOR MANUEL Deleon CD(ASCP)  **Electronically Signed Out**  [facility]/6/29/2021          Procedure/Addendum  HPV Procedure Report     Date Ordered:     6/22/2021     Status:  Signed Out       Date Complete:     6/22/2021     By: Lea RUSH(ASCP)       Date Reported:     7/1/2021       INTERPRETATION  Roche HPV DNA High Risk                                  HPV Sample               Thin Prep                    (Ref Range)  HPV Type 16               Not Detected                    (Not  Detected)  HPV Type 18               Not Detected                    (Not   Detected)  Other High Risk HPV          Not Detected                    (Not  Detected)       This test amplifies and detects DNA of 14 high-risk HPV types  associated with cervical cancer and its precursor lesions (HPV types  16, 18, 31, 33, 35, 39, 45, 51, 52, 56, 58, 59, 66, and 68). Sensitivity may be affected by specimen collection methods, stage of  infection, and the presence of interfering substances. Results should  be interpreted in conjunction with other available laboratory and  clinical data. A negative high-risk HPV result does not exclude the  possibility of future cytologic HSIL or underlying CIN2-3 or cancer.   This test is intended for medical purposes only and is not valid for  the evaluation of suspected sexual abuse or for other forensic  purposes. Source:  1: Cervical material, (ThinPrep vial, Imaging-assisted review)    Clinical History  Irregular: N92.6  Colposcopy: 2006  Z01.411 GYN exam (general) (routine) with abnormal findings  Prior abn ormal pap: Cervical dysplasia 6.21.21  Prior abnormal pap: 8/2006 ASCUS +HPV  Prior abnormal pap: 2006 CINI, LSIL, HPV  Co-Test:  ThinPrep Pap with high risk HPV testing  LMP:  5/1/2021  GYNECOLOGIC CYTOLOGY REPORT    Patient Name: Marcella Hahn: [id number]  Path Number: [id number]  [facility] [address] [facility], [address]  [phone]  Fax: [phone]     Human papillomavirus (HPV) DNA probe thin prep high risk   Result Value Ref Range    Specimen Description . CERVIX     HPV Sample . THIN PREP     HPV, Genotype 16 Not Detected Not Detected    HPV, Genotype 18 Not Detected Not Detected    HPV, High Risk Other Not Detected Not Detected    HPV, Interpretation               Assessment:   Diagnosis Orders   1. Encounter for repeat Pap smear due to previous insufficient cervical cells  PAP Smear   2. Urinary tract infection symptoms  Urinalysis Reflex to Culture   3. Abnormal uterine bleeding  US NON OB TRANSVAGINAL    US PELVIS COMPLETE     Patient Active Problem List    Diagnosis Date Noted    Chronic hypertension (no meds) 12/14/2018     Refer to IM      Abnormal uterine bleeding 12/07/2018    Mirena IUD 12/07/2018     Mirena IUD placed in 4/12/18.  Marijuana abuse 12/07/2018    Secondary amenorrhea 06/07/2017    Depression 05/01/2017    Tobacco abuse 05/06/2016     Formatting of this note might be different from the original.  5/2016 Discussed cessation, risks of use on health, tobacco cessation aids   and tobacco cessation programs.       Seasonal allergies 01/15/2015    Morbid obesity ([facility] [address] 09/04/2012    Contraceptive surveillance 12/11/2006    Dysplasia of cervix 09/06/2006     Formatting of this note might be different from the original.  8/2006 PAP with ASCUS +high risk  9/2006 colpo  12/06 pap BENITO I, LSIL, HPV  6/07 pap negative  9/07 pap negative  12/07 pap negative  5/2009 pap negative  4/2010 pap negative      Attention deficit hyperactivity disorder (ADHD) 10/04/2000     Formatting of this note might be different from the original.  Attention deficit disorder with hyperactivity       Counseling Hormonal Based Birth Control:      The patient was seen and counseled on all forms of birth control both male and female  reversible and non. She is aware that hormonal based birth control may increase her risk of developing a blood clot which may increase her morbidity and or mortality. She was counseled on alternate non hormonal based contraception options. We discussed that smoking and any hormonal based contraception may increase the patients risks of developing these life threatening blood clots. All patients are encouraged to stop smoking at the time of contraceptive counseling. Cessation programs were reviewed. The patient was instructed to use barrier contraception for sexually transmitted disease prevention. The patient was also informed of antibiotics decreasing contraceptive efficacy and the need for barrier contraception from the onset of her antibiotic dosing and through a minimum of thirty days from antibiotic cessation. The life threatening side effect profile was reviewed in detail this includes but is not limited to shortness of breath, chest pain, severe or persistent headaches, or calf pain. If any of these occur the patient has been instructed to stop using her hormonal based contraception, notify the office, and go to the emergency department or call 911.     The patient denied any personal history of blood clots in her leg, lung, or heart and denied any family history of stroke, TIA, sudden cardiac death < 36 y.o.,pulmonary embolism, or deep venous thrombosis. PLAN:  Return in about 3 months (around 10/1/2021) for Follow up labs/diagnostics. Repeat Annual every 1 year  Cervical Cytology Evaluation begins at 24years old. If Negative Cytology, Follow-up screening per current guidelines. Return to the office in 12 weeks. Counseled on preventative health maintenance follow-up. Orders Placed This Encounter   Procedures    US NON OB TRANSVAGINAL     This procedure can be scheduled via [employer]. Access your [employer] account by visiting [email or web address]. Standing Status:   Future     Standing Expiration Date:   7/1/2022     Order Specific Question:   Reason for exam:     Answer:   abnormal bleeding    US PELVIS COMPLETE     Standing Status:   Future     Standing Expiration Date:   7/1/2022     Order Specific Question:   Reason for exam:     Answer:   abnormal bleeding    Urinalysis Reflex to Culture     Standing Status:   Future     Standing Expiration Date:   7/1/2022     Order Specific Question:   SPECIFY(EX-CATH,MIDSTREAM,CYSTO,ETC)? Answer:   clean catch    PAP Smear     Patient History:    No LMP recorded. (Menstrual status: Irregular periods). OBGYN Status: Irregular periods  Past Surgical History:  2014: WISDOM TOOTH EXTRACTION;  Left    Problem List        Edg Problems Affecting Cytology    Dysplasia of cervix    Overview Signed 6/21/2021 10:11 AM by Nina Parker of this note might be different from the original.  8/2006 PAP with ASCUS +high risk  9/2006 colpo  12/06 pap BENITO I, LSIL, HPV  6/07 pap negative  9/07 pap negative  12/07 pap negative  5/2009 pap negative  4/2010 pap negative           Social History    Tobacco Use      Smoking status: Current Every Day Smoker        Types: Cigars        Start date: 2003      Smokeless tobacco: Never Used      Tobacco comment: 1-2 black and milds daily       Standing Status:   Future     Standing Expiration Date: 7/1/2022     Order Specific Question:   Collection Type     Answer: Thin Prep     Order Specific Question:   Prior Abnormal Pap Test     Answer:   No     Order Specific Question:   Screening or Diagnostic     Answer:   Screening     Order Specific Question:   HPV Requested? Answer:   Yes     Order Specific Question:   High Risk Patient     Answer:   N/A           The patient, Osito Patterson is a 31 y.o. female, was seen with a total time spent of 30 minutes for the visit on this date of service by the E/M provider. The time component had both face to face and non face to face time spent in determining the total time component. Counseling and education regarding her diagnosis listed below and her options regarding those diagnoses were also included in determining her time component. Diagnosis Orders   1. Encounter for repeat Pap smear due to previous insufficient cervical cells  PAP Smear   2. Urinary tract infection symptoms  Urinalysis Reflex to Culture   3. Abnormal uterine bleeding  US NON OB TRANSVAGINAL    US PELVIS COMPLETE        The patient had her preventative health maintenance recommendations and follow-up reviewed with her at the completion of her visit.

## 2021-07-06 RX ORDER — NORETHINDRONE ACETATE AND ETHINYL ESTRADIOL 1MG-20(21)
1 KIT ORAL DAILY
Qty: 1 PACKET | Refills: 3 | Status: ON HOLD | OUTPATIENT
Start: 2021-07-06 | End: 2021-08-27 | Stop reason: HOSPADM

## 2021-07-06 NOTE — PROGRESS NOTES
Incoming fax asking for alternative medication of Loestrin 1/10.  Per Tristan Lozoya, ok to send it Loestrin 1/20

## 2021-07-08 LAB — CYTOLOGY REPORT: NORMAL

## 2021-08-21 ENCOUNTER — HOSPITAL ENCOUNTER (EMERGENCY)
Age: 31
Discharge: HOME | DRG: 720 | End: 2021-08-21
Attending: EMERGENCY MEDICINE
Payer: COMMERCIAL

## 2021-08-21 ENCOUNTER — APPOINTMENT (OUTPATIENT)
Dept: GENERAL RADIOLOGY | Age: 31
DRG: 720 | End: 2021-08-21
Payer: COMMERCIAL

## 2021-08-21 VITALS
HEART RATE: 102 BPM | RESPIRATION RATE: 20 BRPM | OXYGEN SATURATION: 96 % | SYSTOLIC BLOOD PRESSURE: 155 MMHG | DIASTOLIC BLOOD PRESSURE: 104 MMHG | TEMPERATURE: 101.8 F

## 2021-08-21 DIAGNOSIS — R06.03 ACUTE RESPIRATORY DISTRESS: ICD-10-CM

## 2021-08-21 DIAGNOSIS — U07.1 COVID-19: Primary | ICD-10-CM

## 2021-08-21 LAB
SARS-COV-2, RAPID: DETECTED
SPECIMEN DESCRIPTION: ABNORMAL

## 2021-08-21 PROCEDURE — 94640 AIRWAY INHALATION TREATMENT: CPT

## 2021-08-21 PROCEDURE — 94664 DEMO&/EVAL PT USE INHALER: CPT

## 2021-08-21 PROCEDURE — 71045 X-RAY EXAM CHEST 1 VIEW: CPT

## 2021-08-21 PROCEDURE — 6370000000 HC RX 637 (ALT 250 FOR IP): Performed by: STUDENT IN AN ORGANIZED HEALTH CARE EDUCATION/TRAINING PROGRAM

## 2021-08-21 PROCEDURE — 98960 EDU&TRN PT SELF-MGMT NQHP 1: CPT

## 2021-08-21 PROCEDURE — 87635 SARS-COV-2 COVID-19 AMP PRB: CPT

## 2021-08-21 PROCEDURE — 99282 EMERGENCY DEPT VISIT SF MDM: CPT

## 2021-08-21 RX ORDER — ALBUTEROL SULFATE 90 UG/1
2 AEROSOL, METERED RESPIRATORY (INHALATION) 4 TIMES DAILY PRN
Qty: 1 INHALER | Refills: 0 | Status: SHIPPED | OUTPATIENT
Start: 2021-08-21 | End: 2022-07-01 | Stop reason: SDUPTHER

## 2021-08-21 RX ORDER — ALBUTEROL SULFATE 90 UG/1
2 AEROSOL, METERED RESPIRATORY (INHALATION) ONCE
Status: COMPLETED | OUTPATIENT
Start: 2021-08-21 | End: 2021-08-21

## 2021-08-21 RX ORDER — ACETAMINOPHEN 500 MG
1000 TABLET ORAL ONCE
Status: COMPLETED | OUTPATIENT
Start: 2021-08-21 | End: 2021-08-21

## 2021-08-21 RX ORDER — IBUPROFEN 800 MG/1
800 TABLET ORAL ONCE
Status: COMPLETED | OUTPATIENT
Start: 2021-08-21 | End: 2021-08-21

## 2021-08-21 RX ADMIN — ALBUTEROL SULFATE 2 PUFF: 90 AEROSOL, METERED RESPIRATORY (INHALATION) at 17:33

## 2021-08-21 RX ADMIN — ACETAMINOPHEN 1000 MG: 500 TABLET ORAL at 16:28

## 2021-08-21 RX ADMIN — IBUPROFEN 800 MG: 800 TABLET, FILM COATED ORAL at 16:28

## 2021-08-21 ASSESSMENT — ENCOUNTER SYMPTOMS
VOMITING: 0
SORE THROAT: 1
WHEEZING: 0
DIARRHEA: 0
COUGH: 1
CHEST TIGHTNESS: 0
BACK PAIN: 0
PHOTOPHOBIA: 0
CONSTIPATION: 0
COLOR CHANGE: 0
ABDOMINAL PAIN: 0
NAUSEA: 1
SHORTNESS OF BREATH: 1

## 2021-08-21 ASSESSMENT — PAIN SCALES - GENERAL: PAINLEVEL_OUTOF10: 8

## 2021-08-21 NOTE — ED PROVIDER NOTES
Out of Food in the Last Year:    [address] in the Last Year:    Transportation Needs:     Lack of Transportation (Medical):  Lack of Transportation (Non-Medical):    Physical Activity:     Days of Exercise per Week:     Minutes of Exercise per Session:    Stress:     Feeling of Stress :    Social Connections:     Frequency of Communication with Friends and Family:     Frequency of Social Gatherings with Friends and Family:     Attends Orthodox Services:     Active Member of Clubs or Organizations:     Attends Club or Organization Meetings:     Marital Status:    Intimate Partner Violence:     Fear of Current or Ex-Partner:     Emotionally Abused:     Physically Abused:     Sexually Abused:        Family History   Problem Relation Age of Onset    Diabetes Mother        Allergies:  Patient has no known allergies. Home Medications:  Prior to Admission medications    Medication Sig Start Date End Date Taking? Authorizing Provider   albuterol sulfate HFA (VENTOLIN HFA) 108 (90 Base) MCG/ACT inhaler Inhale 2 puffs into the lungs 4 times daily as needed for Wheezing 8/21/21  Yes Deberah Osgood Gruenbaum,    norethindrone-ethinyl estradiol (LOESTRIN FE 1/20) 1-20 MG-MCG per tablet Take 1 tablet by mouth daily 7/6/21   SHEILA Magana NP   norethindrone-ethinyl estradiol-Fe (LO LOESTRIN FE) 1 MG-10 MCG / 10 MCG tablet Take 1 tablet by mouth daily 7/1/21   Maye Biswas, DO   ibuprofen (ADVIL;MOTRIN) 600 MG tablet Take 1 tablet by mouth every 6 hours as needed for Pain 2/9/19   Nereyda Diez, DO       REVIEW OF SYSTEMS    (2-9 systems for level 4, 10 or more for level 5)      Review of Systems   Constitutional: Negative for chills, diaphoresis, fatigue and fever. HENT: Positive for congestion and sore throat. Eyes: Negative for photophobia and visual disturbance. Respiratory: Positive for cough and shortness of breath. Negative for chest tightness and wheezing. Cardiovascular: Negative for chest pain, palpitations and leg swelling. Gastrointestinal: Positive for nausea. Negative for abdominal pain, constipation, diarrhea and vomiting. Endocrine: Negative for polydipsia, polyphagia and polyuria. Genitourinary: Negative for difficulty urinating, dysuria and urgency. Musculoskeletal: Negative for arthralgias, back pain, neck pain and neck stiffness. Skin: Negative for color change, pallor and rash. Neurological: Negative for dizziness, weakness, light-headedness and headaches. PHYSICAL EXAM   (up to 7 for level 4, 8 or more for level 5)      INITIAL VITALS:   BP (!) 155/104   Pulse 102   Temp 101.8 °F (38.8 °C) (Oral)   Resp 20   SpO2 96%     Physical Exam  Vitals and nursing note reviewed. Constitutional:       General: She is not in acute distress. Appearance: She is well-developed. She is not diaphoretic. HENT:      Head: Normocephalic and atraumatic. Eyes:      General: No scleral icterus. Conjunctiva/sclera: Conjunctivae normal.      Pupils: Pupils are equal, round, and reactive to light. Neck:      Vascular: No JVD. Trachea: No tracheal deviation. Cardiovascular:      Rate and Rhythm: Regular rhythm. Tachycardia present. Pulses: Normal pulses. Heart sounds: Normal heart sounds. Pulmonary:      Effort: Tachypnea and respiratory distress present. Breath sounds: Wheezing present. Chest:      Chest wall: No tenderness. Abdominal:      General: Bowel sounds are normal. There is no distension. Palpations: Abdomen is soft. Tenderness: There is no abdominal tenderness. There is no guarding. Musculoskeletal:      Cervical back: Normal range of motion and neck supple. Right lower leg: No tenderness. No edema. Left lower leg: No tenderness. No edema. Skin:     General: Skin is warm and dry. Capillary Refill: Capillary refill takes less than 2 seconds. Coloration: Skin is not pale. Findings: No erythema. Neurological:      Mental Status: She is alert and oriented to person, place, and time. Cranial Nerves: No cranial nerve deficit. Sensory: No sensory deficit. Psychiatric:         Mood and Affect: Mood normal.         Behavior: Behavior normal.         DIFFERENTIAL  DIAGNOSIS     PLAN (LABS / IMAGING / EKG):  Orders Placed This Encounter   Procedures    COVID-19, Rapid    XR CHEST PORTABLE    DME Order for Pulse Oximeter Device As OP       MEDICATIONS ORDERED:  Orders Placed This Encounter   Medications    acetaminophen (TYLENOL) tablet 1,000 mg    ibuprofen (ADVIL;MOTRIN) tablet 800 mg    albuterol sulfate  (90 Base) MCG/ACT inhaler 2 puff     Order Specific Question:   Initiate RT Bronchodilator Protocol     Answer: Yes    albuterol sulfate HFA (VENTOLIN HFA) 108 (90 Base) MCG/ACT inhaler     Sig: Inhale 2 puffs into the lungs 4 times daily as needed for Wheezing     Dispense:  1 Inhaler     Refill:  0       DDX: COVID-19, pneumonia    MDM/IMPRESSION: This is a 69-year-old female with fever, shortness of breath, mild tachycardia, sore throat. Symptoms ongoing for 4 days. Concerned about Covid. Patient is febrile, will give Tylenol Motrin and reevaluate. Pending Covid results, will discharge with pulse oximeter and follow-up for if Covid negative will expand work-up to include more broad differential, however high suspicion of Covid at this time. Patient is not vaccinated. DIAGNOSTIC RESULTS / EMERGENCY DEPARTMENT COURSE / MDM   LAB RESULTS:  Results for orders placed or performed during the hospital encounter of 08/21/21   COVID-19, Rapid    Specimen: Nasopharyngeal Swab   Result Value Ref Range    Specimen Description . NASOPHARYNGEAL SWAB     SARS-CoV-2, Rapid DETECTED (A) Not Detected         RADIOLOGY:  XR CHEST PORTABLE   Final Result   Patchy bilateral airspace disease suggestive of multifocal inflammatory   process or infection. EKG      All EKG's are interpreted by the Emergency Department Physician who either signs or Co-signs this chart in the absence of a cardiologist.    EMERGENCY DEPARTMENT COURSE:  ED Course as of Aug 21 1829   Sat Aug 21, 2021   1712 Patient noted to be Covid positive. Patient ambulates in place, does not desaturate below 92, but heart rate does get as high as 130. At this time, does not meet requirements for inpatient management of COVID-19. Will discharge with pulse oximeter and strict return precautions. [JG]      ED Course User Index  [JG] Que Jasso DO        PROCEDURES:      CONSULTS:  None    CRITICAL CARE:      FINAL IMPRESSION      1. COVID-19    2.  Acute respiratory distress          DISPOSITION / PLAN     DISPOSITION Decision To Discharge 08/21/2021 05:14:32 PM      PATIENT REFERRED TO:  [facility]  [address] [facility] [address]  [phone]  Go to   If symptoms worsen      DISCHARGE MEDICATIONS:  Discharge Medication List as of 8/21/2021  5:38 PM      START taking these medications    Details   albuterol sulfate HFA (VENTOLIN HFA) 108 (90 Base) MCG/ACT inhaler Inhale 2 puffs into the lungs 4 times daily as needed for Wheezing, Disp-1 Inhaler, R-0Print             Que Jasso DO  Emergency Medicine Resident    (Please note that portions of thisnote were completed with a voice recognition program.  Efforts were made to edit the dictations but occasionally words are mis-transcribed.)     Que Jasso DO  08/21/21 1829

## 2021-08-21 NOTE — ED PROVIDER NOTES
[address] [facility]     Emergency Department     Faculty Attestation    I performed a history and physical examination of the patient and discussed management with the resident. I reviewed the residents note and agree with the documented findings and plan of care. Any areas of disagreement are noted on the chart. I was personally present for the key portions of any procedures. I have documented in the chart those procedures where I was not present during the key portions. I have reviewed the emergency nurses triage note. I agree with the chief complaint, past medical history, past surgical history, allergies, medications, social and family history as documented unless otherwise noted below. For Physician Assistant/ Nurse Practitioner cases/documentation I have personally evaluated this patient and have completed at least one if not all key elements of the E/M (history, physical exam, and MDM). Additional findings are as noted. This patient was evaluated in the Emergency Department for symptoms described in the history of present illness. He/she was evaluated in the context of the global COVID-19 pandemic, which necessitated consideration that the patient might be at risk for infection with the SARS-CoV-2 virus that causes COVID-19. Institutional protocols and algorithms that pertain to the evaluation of patients at risk for COVID-19 are in a state of rapid change based on information released by regulatory bodies including the CDC and federal and state organizations. These policies and algorithms were followed during the patient's care in the ED. Patient with shortness of breath productive cough for 3 days. Did not get the Covid vaccine. Patient is febrile tachycardic on arrival but not hypoxic speaking in full sentences talking on her cell phone. Lungs diffuse rhonchi throughout minimally productive cough no hemoptysis.   Will check chest x-ray, Covid swab, bedside March, reevaluate need for additional work-up      Critical Care     none    Shaquille Harp MD, [place]   Attending Emergency  Physician             Shaquille Harp MD  08/21/21 3001

## 2021-08-21 NOTE — ED NOTES
The following labs labeled with pt sticker and tubed:     [] Lavender   [] on ice   [] Blue   [] Green/yellow  [] Green/black [] on ice  [] Pink  [] Red  [] Yellow       [x] COVID-19 swab    [x] Rapid     [] Urine Sample  [] Pelvic Cultures    [] Blood Cultures     Armida [facility]aneSelect [facility]  08/21/21 0654

## 2021-08-21 NOTE — PROGRESS NOTES
[x] Verified order. [x] Obtained Home Pulse-Oximeter from [facility]. [x] Demonstrated use of Home Pulse-Oximeter. [x] Provided written discharge instruction for Home Pulse-Oximeter. [x] Patient verbalized understanding of discharge instructions including when to return immediately to the emergency department.

## 2021-08-21 NOTE — ED NOTES
Bed: 46PED  Expected date:   Expected time:   Means of arrival:   Comments:     Isamar Figueroa RN  08/21/21 3024

## 2021-08-22 ENCOUNTER — APPOINTMENT (OUTPATIENT)
Dept: GENERAL RADIOLOGY | Age: 31
DRG: 720 | End: 2021-08-22
Payer: COMMERCIAL

## 2021-08-22 ENCOUNTER — HOSPITAL ENCOUNTER (INPATIENT)
Age: 31
LOS: 6 days | Discharge: HOME | DRG: 720 | End: 2021-08-28
Attending: EMERGENCY MEDICINE | Admitting: STUDENT IN AN ORGANIZED HEALTH CARE EDUCATION/TRAINING PROGRAM
Payer: COMMERCIAL

## 2021-08-22 DIAGNOSIS — J18.9 PNEUMONIA DUE TO INFECTIOUS ORGANISM, UNSPECIFIED LATERALITY, UNSPECIFIED PART OF LUNG: ICD-10-CM

## 2021-08-22 DIAGNOSIS — U07.1 COVID-19: Primary | ICD-10-CM

## 2021-08-22 DIAGNOSIS — E66.01 MORBID OBESITY (HCC): ICD-10-CM

## 2021-08-22 PROBLEM — J12.82 PNEUMONIA DUE TO COVID-19 VIRUS: Status: ACTIVE | Noted: 2021-08-22

## 2021-08-22 LAB
-: NORMAL
ABSOLUTE EOS #: 0.03 K/UL (ref 0–0.44)
ABSOLUTE IMMATURE GRANULOCYTE: <0.03 K/UL (ref 0–0.3)
ABSOLUTE LYMPH #: 1.05 K/UL (ref 1.1–3.7)
ABSOLUTE MONO #: 0.53 K/UL (ref 0.1–1.2)
ALBUMIN SERPL-MCNC: 3.6 G/DL (ref 3.5–5.2)
ALBUMIN/GLOBULIN RATIO: 1 (ref 1–2.5)
ALP BLD-CCNC: 52 U/L (ref 35–104)
ALT SERPL-CCNC: 29 U/L (ref 5–33)
ANION GAP SERPL CALCULATED.3IONS-SCNC: 11 MMOL/L (ref 9–17)
AST SERPL-CCNC: 41 U/L
BASOPHILS # BLD: 0 % (ref 0–2)
BASOPHILS ABSOLUTE: <0.03 K/UL (ref 0–0.2)
BILIRUB SERPL-MCNC: <0.1 MG/DL (ref 0.3–1.2)
BUN BLDV-MCNC: 5 MG/DL (ref 6–20)
BUN/CREAT BLD: ABNORMAL (ref 9–20)
CALCIUM SERPL-MCNC: 8.2 MG/DL (ref 8.6–10.4)
CHLORIDE BLD-SCNC: 102 MMOL/L (ref 98–107)
CO2: 24 MMOL/L (ref 20–31)
CREAT SERPL-MCNC: 0.88 MG/DL (ref 0.5–0.9)
DIFFERENTIAL TYPE: ABNORMAL
EOSINOPHILS RELATIVE PERCENT: 1 % (ref 1–4)
GFR AFRICAN AMERICAN: >60 ML/MIN
GFR NON-AFRICAN AMERICAN: >60 ML/MIN
GFR SERPL CREATININE-BSD FRML MDRD: ABNORMAL ML/MIN/{1.73_M2}
GFR SERPL CREATININE-BSD FRML MDRD: ABNORMAL ML/MIN/{1.73_M2}
GLUCOSE BLD-MCNC: 105 MG/DL (ref 70–99)
HCT VFR BLD CALC: 28.8 % (ref 36.3–47.1)
HEMOGLOBIN: 8.7 G/DL (ref 11.9–15.1)
IMMATURE GRANULOCYTES: 0 %
LACTIC ACID, WHOLE BLOOD: 0.8 MMOL/L (ref 0.7–2.1)
LYMPHOCYTES # BLD: 18 % (ref 24–43)
MCH RBC QN AUTO: 24.9 PG (ref 25.2–33.5)
MCHC RBC AUTO-ENTMCNC: 30.2 G/DL (ref 28.4–34.8)
MCV RBC AUTO: 82.5 FL (ref 82.6–102.9)
MONOCYTES # BLD: 9 % (ref 3–12)
NRBC AUTOMATED: 0 PER 100 WBC
PDW BLD-RTO: 15.9 % (ref 11.8–14.4)
PLATELET # BLD: 178 K/UL (ref 138–453)
PLATELET ESTIMATE: ABNORMAL
PMV BLD AUTO: 11.6 FL (ref 8.1–13.5)
POTASSIUM SERPL-SCNC: 3.8 MMOL/L (ref 3.7–5.3)
RBC # BLD: 3.49 M/UL (ref 3.95–5.11)
RBC # BLD: ABNORMAL 10*6/UL
REASON FOR REJECTION: NORMAL
SEG NEUTROPHILS: 72 % (ref 36–65)
SEGMENTED NEUTROPHILS ABSOLUTE COUNT: 4.12 K/UL (ref 1.5–8.1)
SODIUM BLD-SCNC: 137 MMOL/L (ref 135–144)
TOTAL PROTEIN: 7.1 G/DL (ref 6.4–8.3)
WBC # BLD: 5.8 K/UL (ref 3.5–11.3)
WBC # BLD: ABNORMAL 10*3/UL
ZZ NTE CLEAN UP: ORDERED TEST: NORMAL
ZZ NTE WITH NAME CLEAN UP: SPECIMEN SOURCE: NORMAL

## 2021-08-22 PROCEDURE — 87899 AGENT NOS ASSAY W/OPTIC: CPT

## 2021-08-22 PROCEDURE — 2580000003 HC RX 258: Performed by: EMERGENCY MEDICINE

## 2021-08-22 PROCEDURE — 1200000000 HC SEMI PRIVATE

## 2021-08-22 PROCEDURE — 99285 EMERGENCY DEPT VISIT HI MDM: CPT

## 2021-08-22 PROCEDURE — 83605 ASSAY OF LACTIC ACID: CPT

## 2021-08-22 PROCEDURE — 6370000000 HC RX 637 (ALT 250 FOR IP): Performed by: EMERGENCY MEDICINE

## 2021-08-22 PROCEDURE — 84145 PROCALCITONIN (PCT): CPT

## 2021-08-22 PROCEDURE — 87449 NOS EACH ORGANISM AG IA: CPT

## 2021-08-22 PROCEDURE — 6360000002 HC RX W HCPCS: Performed by: EMERGENCY MEDICINE

## 2021-08-22 PROCEDURE — 80053 COMPREHEN METABOLIC PANEL: CPT

## 2021-08-22 PROCEDURE — 71045 X-RAY EXAM CHEST 1 VIEW: CPT

## 2021-08-22 PROCEDURE — 85025 COMPLETE CBC W/AUTO DIFF WBC: CPT

## 2021-08-22 PROCEDURE — 87040 BLOOD CULTURE FOR BACTERIA: CPT

## 2021-08-22 PROCEDURE — 93005 ELECTROCARDIOGRAM TRACING: CPT | Performed by: EMERGENCY MEDICINE

## 2021-08-22 PROCEDURE — 81001 URINALYSIS AUTO W/SCOPE: CPT

## 2021-08-22 PROCEDURE — 94640 AIRWAY INHALATION TREATMENT: CPT

## 2021-08-22 RX ORDER — DEXAMETHASONE 4 MG/1
10 TABLET ORAL ONCE
Status: COMPLETED | OUTPATIENT
Start: 2021-08-22 | End: 2021-08-22

## 2021-08-22 RX ORDER — BENZONATATE 100 MG/1
100 CAPSULE ORAL 3 TIMES DAILY PRN
Status: DISCONTINUED | OUTPATIENT
Start: 2021-08-22 | End: 2021-08-28 | Stop reason: HOSPADM

## 2021-08-22 RX ORDER — 0.9 % SODIUM CHLORIDE 0.9 %
1000 INTRAVENOUS SOLUTION INTRAVENOUS ONCE
Status: COMPLETED | OUTPATIENT
Start: 2021-08-22 | End: 2021-08-23

## 2021-08-22 RX ORDER — IBUPROFEN 800 MG/1
800 TABLET ORAL ONCE
Status: COMPLETED | OUTPATIENT
Start: 2021-08-22 | End: 2021-08-22

## 2021-08-22 RX ORDER — ALBUTEROL SULFATE 90 UG/1
2 AEROSOL, METERED RESPIRATORY (INHALATION) EVERY 4 HOURS PRN
Status: DISCONTINUED | OUTPATIENT
Start: 2021-08-22 | End: 2021-08-28 | Stop reason: HOSPADM

## 2021-08-22 RX ADMIN — ALBUTEROL SULFATE 2 PUFF: 90 AEROSOL, METERED RESPIRATORY (INHALATION) at 23:18

## 2021-08-22 RX ADMIN — DEXAMETHASONE 10 MG: 4 TABLET ORAL at 22:26

## 2021-08-22 RX ADMIN — IBUPROFEN 800 MG: 800 TABLET, FILM COATED ORAL at 21:10

## 2021-08-22 RX ADMIN — BENZONATATE 100 MG: 100 CAPSULE ORAL at 22:26

## 2021-08-22 RX ADMIN — SODIUM CHLORIDE 1000 ML: 9 INJECTION, SOLUTION INTRAVENOUS at 21:10

## 2021-08-22 ASSESSMENT — PAIN DESCRIPTION - ONSET: ONSET: ON-GOING

## 2021-08-22 ASSESSMENT — ENCOUNTER SYMPTOMS
SHORTNESS OF BREATH: 1
CONSTIPATION: 0
COUGH: 1
DIARRHEA: 0
CHEST TIGHTNESS: 1
VOMITING: 0
COLOR CHANGE: 0
ABDOMINAL PAIN: 0
NAUSEA: 1

## 2021-08-22 ASSESSMENT — PAIN DESCRIPTION - LOCATION: LOCATION: CHEST;BACK

## 2021-08-22 ASSESSMENT — PAIN DESCRIPTION - FREQUENCY: FREQUENCY: CONTINUOUS

## 2021-08-22 ASSESSMENT — PAIN DESCRIPTION - PAIN TYPE: TYPE: ACUTE PAIN

## 2021-08-22 ASSESSMENT — PAIN SCALES - GENERAL: PAINLEVEL_OUTOF10: 8

## 2021-08-22 ASSESSMENT — PAIN DESCRIPTION - PROGRESSION: CLINICAL_PROGRESSION: GRADUALLY WORSENING

## 2021-08-22 ASSESSMENT — PAIN DESCRIPTION - ORIENTATION: ORIENTATION: MID

## 2021-08-22 ASSESSMENT — PAIN DESCRIPTION - DESCRIPTORS: DESCRIPTORS: BURNING

## 2021-08-23 PROBLEM — E55.9 VITAMIN D DEFICIENCY: Status: ACTIVE | Noted: 2021-08-23

## 2021-08-23 PROBLEM — R65.20 SEPSIS WITH ACUTE HYPOXIC RESPIRATORY FAILURE WITHOUT SEPTIC SHOCK (HCC): Status: ACTIVE | Noted: 2021-08-23

## 2021-08-23 PROBLEM — J96.01 SEPSIS WITH ACUTE HYPOXIC RESPIRATORY FAILURE WITHOUT SEPTIC SHOCK (HCC): Status: ACTIVE | Noted: 2021-08-23

## 2021-08-23 PROBLEM — D50.0 IRON DEFICIENCY ANEMIA DUE TO CHRONIC BLOOD LOSS: Status: ACTIVE | Noted: 2021-08-23

## 2021-08-23 PROBLEM — A41.9 SEPSIS WITH ACUTE HYPOXIC RESPIRATORY FAILURE WITHOUT SEPTIC SHOCK (HCC): Status: ACTIVE | Noted: 2021-08-23

## 2021-08-23 LAB
-: ABNORMAL
ABSOLUTE EOS #: <0.03 K/UL (ref 0–0.44)
ABSOLUTE IMMATURE GRANULOCYTE: <0.03 K/UL (ref 0–0.3)
ABSOLUTE LYMPH #: 0.77 K/UL (ref 1.1–3.7)
ABSOLUTE MONO #: 0.18 K/UL (ref 0.1–1.2)
ALBUMIN SERPL-MCNC: 3.4 G/DL (ref 3.5–5.2)
ALBUMIN/GLOBULIN RATIO: 0.9 (ref 1–2.5)
ALP BLD-CCNC: 51 U/L (ref 35–104)
ALT SERPL-CCNC: 31 U/L (ref 5–33)
AMORPHOUS: ABNORMAL
ANION GAP SERPL CALCULATED.3IONS-SCNC: 13 MMOL/L (ref 9–17)
AST SERPL-CCNC: 45 U/L
BACTERIA: ABNORMAL
BASOPHILS # BLD: 0 % (ref 0–2)
BASOPHILS ABSOLUTE: <0.03 K/UL (ref 0–0.2)
BILIRUB SERPL-MCNC: <0.1 MG/DL (ref 0.3–1.2)
BILIRUBIN URINE: NEGATIVE
BUN BLDV-MCNC: 6 MG/DL (ref 6–20)
BUN/CREAT BLD: ABNORMAL (ref 9–20)
C-REACTIVE PROTEIN: 26.1 MG/L (ref 0–5)
CALCIUM SERPL-MCNC: 8.6 MG/DL (ref 8.6–10.4)
CASTS UA: ABNORMAL /LPF (ref 0–8)
CHLORIDE BLD-SCNC: 107 MMOL/L (ref 98–107)
CO2: 22 MMOL/L (ref 20–31)
COLOR: YELLOW
COMMENT UA: ABNORMAL
CREAT SERPL-MCNC: 0.73 MG/DL (ref 0.5–0.9)
CRYSTALS, UA: ABNORMAL /HPF
CULTURE: NORMAL
D-DIMER QUANTITATIVE: 1.23 MG/L FEU
DIFFERENTIAL TYPE: ABNORMAL
DIRECT EXAM: NORMAL
DIRECT EXAM: NORMAL
EKG ATRIAL RATE: 104 BPM
EKG P AXIS: 39 DEGREES
EKG P-R INTERVAL: 146 MS
EKG Q-T INTERVAL: 328 MS
EKG QRS DURATION: 88 MS
EKG QTC CALCULATION (BAZETT): 431 MS
EKG R AXIS: 11 DEGREES
EKG T AXIS: -2 DEGREES
EKG VENTRICULAR RATE: 104 BPM
EOSINOPHILS RELATIVE PERCENT: 0 % (ref 1–4)
EPITHELIAL CELLS UA: ABNORMAL /HPF (ref 0–5)
FERRITIN: 42 UG/L (ref 13–150)
FIBRINOGEN: 510 MG/DL (ref 140–420)
GFR AFRICAN AMERICAN: >60 ML/MIN
GFR NON-AFRICAN AMERICAN: >60 ML/MIN
GFR SERPL CREATININE-BSD FRML MDRD: ABNORMAL ML/MIN/{1.73_M2}
GFR SERPL CREATININE-BSD FRML MDRD: ABNORMAL ML/MIN/{1.73_M2}
GLUCOSE BLD-MCNC: 130 MG/DL (ref 70–99)
GLUCOSE URINE: NEGATIVE
HCT VFR BLD CALC: 34.8 % (ref 36.3–47.1)
HEMOGLOBIN: 10.4 G/DL (ref 11.9–15.1)
IMMATURE GRANULOCYTES: 0 %
INR BLD: 0.9
IRON SATURATION: 7 % (ref 20–55)
IRON: 23 UG/DL (ref 37–145)
KETONES, URINE: NEGATIVE
LACTATE DEHYDROGENASE: 318 U/L (ref 135–214)
LACTIC ACID, SEPSIS WHOLE BLOOD: 0.9 MMOL/L (ref 0.5–1.9)
LACTIC ACID, SEPSIS: NORMAL MMOL/L (ref 0.5–1.9)
LACTIC ACID, WHOLE BLOOD: 1.3 MMOL/L (ref 0.7–2.1)
LACTIC ACID: NORMAL MMOL/L
LEGIONELLA PNEUMOPHILIA AG, URINE: NEGATIVE
LEUKOCYTE ESTERASE, URINE: NEGATIVE
LYMPHOCYTES # BLD: 19 % (ref 24–43)
Lab: NORMAL
Lab: NORMAL
MCH RBC QN AUTO: 24.7 PG (ref 25.2–33.5)
MCHC RBC AUTO-ENTMCNC: 29.9 G/DL (ref 28.4–34.8)
MCV RBC AUTO: 82.7 FL (ref 82.6–102.9)
MONOCYTES # BLD: 4 % (ref 3–12)
MUCUS: ABNORMAL
NITRITE, URINE: NEGATIVE
NRBC AUTOMATED: 0 PER 100 WBC
OTHER OBSERVATIONS UA: ABNORMAL
PARTIAL THROMBOPLASTIN TIME: 24.4 SEC (ref 20.5–30.5)
PDW BLD-RTO: 15.6 % (ref 11.8–14.4)
PH UA: 5.5 (ref 5–8)
PLATELET # BLD: 200 K/UL (ref 138–453)
PLATELET ESTIMATE: ABNORMAL
PMV BLD AUTO: 11.1 FL (ref 8.1–13.5)
POTASSIUM SERPL-SCNC: 4.4 MMOL/L (ref 3.7–5.3)
PROCALCITONIN: 0.08 NG/ML
PROCALCITONIN: 0.09 NG/ML
PROTEIN UA: NEGATIVE
PROTHROMBIN TIME: 9.8 SEC (ref 9.1–12.3)
RBC # BLD: 4.21 M/UL (ref 3.95–5.11)
RBC # BLD: ABNORMAL 10*6/UL
RBC UA: ABNORMAL /HPF (ref 0–4)
RENAL EPITHELIAL, UA: ABNORMAL /HPF
SEG NEUTROPHILS: 77 % (ref 36–65)
SEGMENTED NEUTROPHILS ABSOLUTE COUNT: 3.21 K/UL (ref 1.5–8.1)
SODIUM BLD-SCNC: 142 MMOL/L (ref 135–144)
SOURCE: NORMAL
SPECIFIC GRAVITY UA: 1.01 (ref 1–1.03)
SPECIMEN DESCRIPTION: NORMAL
SPECIMEN DESCRIPTION: NORMAL
STREP PNEUMONIAE ANTIGEN: NEGATIVE
TOTAL IRON BINDING CAPACITY: 352 UG/DL (ref 250–450)
TOTAL PROTEIN: 7.4 G/DL (ref 6.4–8.3)
TRICHOMONAS: ABNORMAL
TROPONIN INTERP: NORMAL
TROPONIN T: NORMAL NG/ML
TROPONIN, HIGH SENSITIVITY: <6 NG/L (ref 0–14)
TURBIDITY: CLEAR
UNSATURATED IRON BINDING CAPACITY: 329 UG/DL (ref 112–347)
URINE HGB: ABNORMAL
UROBILINOGEN, URINE: NORMAL
VITAMIN D 25-HYDROXY: 7 NG/ML (ref 30–100)
WBC # BLD: 4.2 K/UL (ref 3.5–11.3)
WBC # BLD: ABNORMAL 10*3/UL
WBC UA: ABNORMAL /HPF (ref 0–5)
YEAST: ABNORMAL

## 2021-08-23 PROCEDURE — 87070 CULTURE OTHR SPECIMN AEROBIC: CPT

## 2021-08-23 PROCEDURE — 6370000000 HC RX 637 (ALT 250 FOR IP): Performed by: NURSE PRACTITIONER

## 2021-08-23 PROCEDURE — 85384 FIBRINOGEN ACTIVITY: CPT

## 2021-08-23 PROCEDURE — 6360000002 HC RX W HCPCS: Performed by: NURSE PRACTITIONER

## 2021-08-23 PROCEDURE — 85730 THROMBOPLASTIN TIME PARTIAL: CPT

## 2021-08-23 PROCEDURE — 1200000000 HC SEMI PRIVATE

## 2021-08-23 PROCEDURE — 6370000000 HC RX 637 (ALT 250 FOR IP): Performed by: EMERGENCY MEDICINE

## 2021-08-23 PROCEDURE — 84145 PROCALCITONIN (PCT): CPT

## 2021-08-23 PROCEDURE — 94760 N-INVAS EAR/PLS OXIMETRY 1: CPT

## 2021-08-23 PROCEDURE — 87205 SMEAR GRAM STAIN: CPT

## 2021-08-23 PROCEDURE — 97535 SELF CARE MNGMENT TRAINING: CPT

## 2021-08-23 PROCEDURE — 6360000002 HC RX W HCPCS: Performed by: STUDENT IN AN ORGANIZED HEALTH CARE EDUCATION/TRAINING PROGRAM

## 2021-08-23 PROCEDURE — 84484 ASSAY OF TROPONIN QUANT: CPT

## 2021-08-23 PROCEDURE — 83615 LACTATE (LD) (LDH) ENZYME: CPT

## 2021-08-23 PROCEDURE — 99223 1ST HOSP IP/OBS HIGH 75: CPT | Performed by: STUDENT IN AN ORGANIZED HEALTH CARE EDUCATION/TRAINING PROGRAM

## 2021-08-23 PROCEDURE — 97162 PT EVAL MOD COMPLEX 30 MIN: CPT

## 2021-08-23 PROCEDURE — 86140 C-REACTIVE PROTEIN: CPT

## 2021-08-23 PROCEDURE — 80053 COMPREHEN METABOLIC PANEL: CPT

## 2021-08-23 PROCEDURE — 85610 PROTHROMBIN TIME: CPT

## 2021-08-23 PROCEDURE — 99254 IP/OBS CNSLTJ NEW/EST MOD 60: CPT | Performed by: INTERNAL MEDICINE

## 2021-08-23 PROCEDURE — 83540 ASSAY OF IRON: CPT

## 2021-08-23 PROCEDURE — 2580000003 HC RX 258: Performed by: STUDENT IN AN ORGANIZED HEALTH CARE EDUCATION/TRAINING PROGRAM

## 2021-08-23 PROCEDURE — 97530 THERAPEUTIC ACTIVITIES: CPT

## 2021-08-23 PROCEDURE — 6370000000 HC RX 637 (ALT 250 FOR IP): Performed by: STUDENT IN AN ORGANIZED HEALTH CARE EDUCATION/TRAINING PROGRAM

## 2021-08-23 PROCEDURE — 93010 ELECTROCARDIOGRAM REPORT: CPT | Performed by: INTERNAL MEDICINE

## 2021-08-23 PROCEDURE — 85379 FIBRIN DEGRADATION QUANT: CPT

## 2021-08-23 PROCEDURE — 85025 COMPLETE CBC W/AUTO DIFF WBC: CPT

## 2021-08-23 PROCEDURE — 2580000003 HC RX 258: Performed by: NURSE PRACTITIONER

## 2021-08-23 PROCEDURE — 83550 IRON BINDING TEST: CPT

## 2021-08-23 PROCEDURE — 82306 VITAMIN D 25 HYDROXY: CPT

## 2021-08-23 PROCEDURE — 82728 ASSAY OF FERRITIN: CPT

## 2021-08-23 PROCEDURE — 36415 COLL VENOUS BLD VENIPUNCTURE: CPT

## 2021-08-23 PROCEDURE — 83605 ASSAY OF LACTIC ACID: CPT

## 2021-08-23 PROCEDURE — 97166 OT EVAL MOD COMPLEX 45 MIN: CPT

## 2021-08-23 PROCEDURE — XW033E5 INTRODUCTION OF REMDESIVIR ANTI-INFECTIVE INTO PERIPHERAL VEIN, PERCUTANEOUS APPROACH, NEW TECHNOLOGY GROUP 5: ICD-10-PCS | Performed by: INTERNAL MEDICINE

## 2021-08-23 RX ORDER — ACETAMINOPHEN 325 MG/1
650 TABLET ORAL EVERY 6 HOURS PRN
Status: DISCONTINUED | OUTPATIENT
Start: 2021-08-23 | End: 2021-08-28 | Stop reason: HOSPADM

## 2021-08-23 RX ORDER — LANOLIN ALCOHOL/MO/W.PET/CERES
325 CREAM (GRAM) TOPICAL
Status: DISCONTINUED | OUTPATIENT
Start: 2021-08-24 | End: 2021-08-28 | Stop reason: HOSPADM

## 2021-08-23 RX ORDER — VITAMIN B COMPLEX
2000 TABLET ORAL DAILY
Status: DISCONTINUED | OUTPATIENT
Start: 2021-08-23 | End: 2021-08-23

## 2021-08-23 RX ORDER — ONDANSETRON 4 MG/1
4 TABLET, ORALLY DISINTEGRATING ORAL EVERY 8 HOURS PRN
Status: DISCONTINUED | OUTPATIENT
Start: 2021-08-23 | End: 2021-08-28 | Stop reason: HOSPADM

## 2021-08-23 RX ORDER — GUAIFENESIN DEXTROMETHORPHAN HYDROBROMIDE ORAL SOLUTION 10; 100 MG/5ML; MG/5ML
5 SOLUTION ORAL EVERY 4 HOURS PRN
Status: DISCONTINUED | OUTPATIENT
Start: 2021-08-23 | End: 2021-08-28 | Stop reason: HOSPADM

## 2021-08-23 RX ORDER — SODIUM CHLORIDE 0.9 % (FLUSH) 0.9 %
10 SYRINGE (ML) INJECTION PRN
Status: DISCONTINUED | OUTPATIENT
Start: 2021-08-23 | End: 2021-08-28 | Stop reason: HOSPADM

## 2021-08-23 RX ORDER — ONDANSETRON 2 MG/ML
4 INJECTION INTRAMUSCULAR; INTRAVENOUS EVERY 6 HOURS PRN
Status: DISCONTINUED | OUTPATIENT
Start: 2021-08-23 | End: 2021-08-28 | Stop reason: HOSPADM

## 2021-08-23 RX ORDER — SODIUM CHLORIDE 9 MG/ML
25 INJECTION, SOLUTION INTRAVENOUS PRN
Status: DISCONTINUED | OUTPATIENT
Start: 2021-08-23 | End: 2021-08-28 | Stop reason: HOSPADM

## 2021-08-23 RX ORDER — SODIUM CHLORIDE 0.9 % (FLUSH) 0.9 %
5-40 SYRINGE (ML) INJECTION EVERY 12 HOURS SCHEDULED
Status: DISCONTINUED | OUTPATIENT
Start: 2021-08-23 | End: 2021-08-28 | Stop reason: HOSPADM

## 2021-08-23 RX ORDER — AZITHROMYCIN 250 MG/1
500 TABLET, FILM COATED ORAL EVERY 24 HOURS
Status: DISCONTINUED | OUTPATIENT
Start: 2021-08-23 | End: 2021-08-23

## 2021-08-23 RX ORDER — ERGOCALCIFEROL 1.25 MG/1
50000 CAPSULE ORAL WEEKLY
Status: DISCONTINUED | OUTPATIENT
Start: 2021-08-23 | End: 2021-08-28 | Stop reason: HOSPADM

## 2021-08-23 RX ORDER — ACETAMINOPHEN 650 MG/1
650 SUPPOSITORY RECTAL EVERY 6 HOURS PRN
Status: DISCONTINUED | OUTPATIENT
Start: 2021-08-23 | End: 2021-08-28 | Stop reason: HOSPADM

## 2021-08-23 RX ORDER — 0.9 % SODIUM CHLORIDE 0.9 %
30 INTRAVENOUS SOLUTION INTRAVENOUS PRN
Status: DISCONTINUED | OUTPATIENT
Start: 2021-08-23 | End: 2021-08-28 | Stop reason: HOSPADM

## 2021-08-23 RX ADMIN — CEFTRIAXONE SODIUM 1000 MG: 1 INJECTION, POWDER, FOR SOLUTION INTRAMUSCULAR; INTRAVENOUS at 04:56

## 2021-08-23 RX ADMIN — ERGOCALCIFEROL 50000 UNITS: 1.25 CAPSULE ORAL at 20:43

## 2021-08-23 RX ADMIN — BENZONATATE 100 MG: 100 CAPSULE ORAL at 04:46

## 2021-08-23 RX ADMIN — Medication 2000 UNITS: at 08:33

## 2021-08-23 RX ADMIN — ALBUTEROL SULFATE 2 PUFF: 90 AEROSOL, METERED RESPIRATORY (INHALATION) at 18:48

## 2021-08-23 RX ADMIN — SODIUM CHLORIDE, PRESERVATIVE FREE 10 ML: 5 INJECTION INTRAVENOUS at 20:43

## 2021-08-23 RX ADMIN — GUAIFENESIN DEXTROMETHORPHAN HYDROBROMIDE ORAL SOLUTION 5 ML: 200; 20 SOLUTION ORAL at 04:46

## 2021-08-23 RX ADMIN — IRON SUCROSE 300 MG: 20 INJECTION, SOLUTION INTRAVENOUS at 17:19

## 2021-08-23 RX ADMIN — DEXAMETHASONE 6 MG: 4 TABLET ORAL at 10:59

## 2021-08-23 RX ADMIN — AZITHROMYCIN 500 MG: 250 TABLET, FILM COATED ORAL at 04:46

## 2021-08-23 RX ADMIN — ALBUTEROL SULFATE 2 PUFF: 90 AEROSOL, METERED RESPIRATORY (INHALATION) at 22:38

## 2021-08-23 RX ADMIN — ENOXAPARIN SODIUM 40 MG: 40 INJECTION SUBCUTANEOUS at 20:43

## 2021-08-23 ASSESSMENT — ENCOUNTER SYMPTOMS
TACHYPNEA: 1
ABDOMINAL DISTENTION: 0
SHORTNESS OF BREATH: 1
COUGH: 1
ABDOMINAL PAIN: 0
EYE PAIN: 0
CONSTIPATION: 1
DIARRHEA: 0
WHEEZING: 0
APNEA: 0
COLOR CHANGE: 0

## 2021-08-23 ASSESSMENT — PAIN SCALES - GENERAL
PAINLEVEL_OUTOF10: 0

## 2021-08-23 NOTE — ED PROVIDER NOTES
FACULTY SIGN-OUT  ADDENDUM     Care of this patient was assumed from previous attending physician. The patient's initial evaluation and plan have been discussed with the prior provider who initially evaluated the patient. Attestation  I was available and discussed any additional care issues that arose and coordinated the management plans with the resident(s) caring for the patient during my duty period. Any areas of disagreement with resident's documentation of care or procedures are noted on the chart. I was personally present for the key portions of any/all procedures, during my duty period. I have documented in the chart those procedures where I was not present during the key portions. ED COURSE      The patient was given the following medications:  Orders Placed This Encounter   Medications    0.9 % sodium chloride bolus    ibuprofen (ADVIL;MOTRIN) tablet 800 mg    albuterol sulfate  (90 Base) MCG/ACT inhaler 2 puff     Order Specific Question:   Initiate RT Bronchodilator Protocol     Answer:   Yes       RECENT VITALS:   Temp: 102.9 °F (39.4 °C), Pulse: 103, Resp: 22, BP: (!) 160/82    MEDICAL DECISION MAKING        Flako Porter is a 27 y.o. female who presents to the Emergency Department with complaints of sirs/covid    Bert Root MD, MD, F.A.C.E.P.   Attending Emergency Physician    (Please note that portions of this note were completed with a voice recognition program.  Efforts were made to edit the dictations but occasionally words are mis-transcribed.)        Bert Root MD  08/22/21 2200

## 2021-08-23 NOTE — CONSULTS
[facility] -   Infectious diseases evaluation  admission date 8/22/2021    reason for consultation:   covid    Impression :   Current:  · covid pneumonia and fever desaturation  · Elevated CRP    Other:  · Smoker and obese  Discussion / summary of stay / plan of care   ·   Recommendations   · Due to rapid worsening and desaturation 88 in ER, start remdesevir 5 days till 8/27  · FU LFT  · Ok for lovenox, recently showed improved outcome when started early  · On decadron, by medicine - will follow CRP progress  · Get ferritin      Infection Control Recommendations   · [place] Precautions  · Contact Isolation   · Airborne isolation  · Droplet Isolation      Antimicrobial Stewardship Recommendations   · Simplification of therapy  · Targeted therapy      Coordination ofOutpatient Care:   · Estimated Length of IV antimicrobials:  · Patient will need Midline / picc Catheter Insertion:   · Patient will need SNF:  · Patient will need outpatient wound care:     History of Present Illness:   Initial history:  Miguel Ángel Reich is a 27y.o.-year-old female comes for SOB cough x 7 days PTA and desaturation at home, 88 So2, fever and tachycardia and tachypnea,  bilat infil on CXr and CRP slightly high  On RA since admission though  But per ER, progressed and got worse within 1 days in ER        Interval changes  8/23/2021   Patient Vitals for the past 8 hrs:   BP Temp Temp src Pulse Resp SpO2   08/23/21 1605 (!) 153/112 98.2 °F (36.8 °C) Oral 94 23 94 %   08/23/21 1213 (!) 146/104 97.4 °F (36.3 °C) Oral 85 22 93 %       Summary of relevant labs:  Labs:  Procalcitonin0. 09High   WBC4.2   D-Dimer, KWZZQ4.20   [id number]   CRP26.1High     Micro:  covid + 8/21/21  Imaging:  CXR   Mild interstitial infiltrates unchanged       I have personally reviewed the past medical history, past surgical history, medications, social history, and family history, and I haveupdated the database accordingly. Allergies:   Patient has no known allergies. Review of Systems:     Review of Systems   Constitutional: Positive for activity change, appetite change, fatigue and fever. HENT: Negative for congestion. Eyes: Negative for pain. Respiratory: Positive for cough and shortness of breath. Negative for apnea. Cardiovascular: Negative for chest pain. Gastrointestinal: Negative for abdominal distention. Endocrine: Negative for polydipsia. Genitourinary: Negative for dysuria. Musculoskeletal: Negative for arthralgias. Skin: Negative for color change. Allergic/Immunologic: Negative for immunocompromised state. Neurological: Negative for dizziness. Hematological: Negative for adenopathy. Psychiatric/Behavioral: Negative for agitation. Physical Examination :       Physical Exam  Constitutional:       Appearance: She is obese. She is not ill-appearing. HENT:      Head: Normocephalic and atraumatic. Nose: Nose normal.      Mouth/Throat:      Mouth: Mucous membranes are moist.   Eyes:      General: No scleral icterus. Conjunctiva/sclera: Conjunctivae normal.   Cardiovascular:      Rate and Rhythm: Normal rate and regular rhythm. Heart sounds: Normal heart sounds. No murmur heard. Pulmonary:      Effort: No respiratory distress. Breath sounds: Normal breath sounds. Abdominal:      General: There is no distension. Palpations: Abdomen is soft. There is no mass. Genitourinary:     Comments: No kerr  Musculoskeletal:         General: No swelling or deformity. Cervical back: Neck supple. No rigidity. Skin:     General: Skin is dry. Coloration: Skin is not jaundiced. Neurological:      General: No focal deficit present. Mental Status: She is alert and oriented to person, place, and time. Psychiatric:         Mood and Affect: Mood normal.         Thought Content:  Thought content normal.         Past Medical History:     Past Medical History:   Diagnosis Date    Depression 5/1/2017       Past Surgical  History:     Past Surgical History:   Procedure Laterality Date    WISDOM TOOTH EXTRACTION Left 2014       Medications:      sodium chloride flush  5-40 mL Intravenous 2 times per day    dexamethasone  6 mg Oral Daily    vitamin D  50,000 Units Oral Weekly    enoxaparin  40 mg Subcutaneous BID    iron sucrose  300 mg Intravenous Once    [START ON 8/24/2021] ferrous sulfate  325 mg Oral Daily with breakfast       Social History:     Social History     Socioeconomic History    Marital status: Single     Spouse name: Not on file    Number of children: Not on file    Years of education: Not on file    Highest education level: Not on file   Occupational History    Not on file   Tobacco Use    Smoking status: Current Every Day Smoker     Types: Cigars     Start date: 2003    Smokeless tobacco: Never Used    Tobacco comment: 1-2 black and milds daily   Vaping Use    Vaping Use: Never used   Substance and Sexual Activity    Alcohol use: Yes     Comment: Occasionally     Drug use: Not Currently     Types: Marijuana     Comment: daily use    Sexual activity: Yes     Partners: Male     Birth control/protection: I.U.D. Other Topics Concern    Not on file   Social History Narrative    Not on file     Social Determinants of Health     Financial Resource Strain:     Difficulty of Paying Living Expenses:    Food Insecurity:     Worried About Running Out of Food in the Last Year:     [address] in the Last Year:    Transportation Needs:     Lack of Transportation (Medical):      Lack of Transportation (Non-Medical):    Physical Activity:     Days of Exercise per Week:     Minutes of Exercise per Session:    Stress:     Feeling of Stress :    Social Connections:     Frequency of Communication with Friends and Family:     Frequency of Social Gatherings with Friends and Family:     Attends Protestant Services:     Active Member of Clubs or Organizations:     Attends Club or Organization Meetings:     Marital Status:    Intimate Partner Violence:     Fear of Current or Ex-Partner:     Emotionally Abused:     Physically Abused:     Sexually Abused:        Family History:     Family History   Problem Relation Age of Onset    Diabetes Mother       Medical Decision Making:   I have independently reviewed/ordered the following labs:    CBC with Differential:   Recent Labs     08/22/21 2133 08/23/21  1021   WBC 5.8 4.2   HGB 8.7* 10.4*   HCT 28.8* 34.8*    200   LYMPHOPCT 18* 19*   MONOPCT 9 4     BMP:  Recent Labs     08/22/21 2133 08/23/21  1021    142   K 3.8 4.4    107   CO2 24 22   BUN 5* 6   CREATININE 0.88 0.73     Hepatic Function Panel:   Recent Labs     08/22/21 2133 08/23/21  1021   PROT 7.1 7.4   LABALBU 3.6 3.4*   BILITOT <0.10* <0.10*   ALKPHOS 52 51   ALT 29 31   AST 41* 45*     No results for input(s): RPR in the last 72 hours. No results for input(s): HIV in the last 72 hours. No results for input(s): BC in the last 72 hours. Lab Results   Component Value Date    CREATININE 0.73 08/23/2021    GLUCOSE 130 08/23/2021       Detailed results: Thank you for allowing us to participate in the care of this patient. Please call with questions. This note is created with the assistance of a speech recognition program.  While intending to generate adocument that actually reflects the content of the visit, the document can still have some errors including those of syntax and sound a like substitutions which may escape proof reading. It such instances, actual meaningcan be extrapolated by contextual diversion.     Keli Samaniego MD  Office: [phone]  Perfect serve / office [phone]

## 2021-08-23 NOTE — H&P
[facility]  Office: [address], DO, Chelle England, DO, Siva Akbar, DO, Flora Adnres, DO, Tanvir Kothari MD, Iesha Dexter MD, Racnho Chambers MD, Emi Murcia MD, Magdi Roberts MD, Yenny Johnson MD, Skylar Akbar MD, Kendra Vareal, DO, Franciso Schaumann, MD, Chi Jones DO, Flip Clayton MD,  Pretty Zuniga DO, Cassidy Garland MD, Denman Najjar, MD, Gama Reeves MD, Ned Fairchild MD, Chelita Rosa MD, Hope Adkins MD, Nayeli Howe MD, Adolfo Landon, [facility], [address], [facility], Janiya Pierce, CNP, Selene Heller, CNS, Markie Huynh, CNP, Abdullahi Hernandez, CNP, Alma Rosa Cullen, CNP, Rachel Garvey, CNP, Teodoro Kumar, CNP, Abe Basurto PA-C, Mitchel Silva, [facility], Alan Rushing, CNP, Clint Perkins, CNP, Belen Martinez, CNP, Bobby Hinojosa, CNP, Domenic Tay, CNP, Jarred Almaraz, CNP, Mello Foote, CNP, aKrine Delgado, CNP         [address]    HISTORY AND PHYSICAL EXAMINATION            Date:   8/23/2021  Patient name:  Sanford Verma  Date of admission:  8/22/2021  8:31 PM  MRN:   [id number]  Account:  [de-identified]  YOB: 1990  PCP:    No primary care provider on file. Room:   [address] [place] [address]  Code Status:    Full Code    Chief Complaint:     Decreasing oxygen saturation, chest pain and cough    History Obtained From:     patient    History of Present Illness:     Sanford Verma is a 27 y.o. Non- / non  female who presents with shortness of breath and decreasing saturations. Patient with past medical history of morbid obesity, depression, menorrhagia returns to the ER for shortness of breath and low oxygen saturation episode at home. Patient was in the ER on Saturday for fatigue, fever, dyspnea for 4 days, was diagnosed with Covid with pulse ox as patient was saturating well on room air. Patient states that at home her pulse ox showed saturation of 88% which prompted her to come to ER. Patient also reports constant shortness of breath, more with exertion. She felt febrile. In the ER patient was noted to be febrile, temperature of 100.9, tachycardic with a temperature of 109, blood pressure 135/96. BMP normal.  Lactic acid 1.3. Troponin less than 6. Vitamin D 7. Past Medical History:     Past Medical History:   Diagnosis Date    Depression 5/1/2017        Past Surgical History:     Past Surgical History:   Procedure Laterality Date    WISDOM TOOTH EXTRACTION Left 2014        Medications Prior to Admission:     Prior to Admission medications    Medication Sig Start Date End Date Taking? Authorizing Provider   albuterol sulfate HFA (VENTOLIN HFA) 108 (90 Base) MCG/ACT inhaler Inhale 2 puffs into the lungs 4 times daily as needed for Wheezing 8/21/21   Bishnu Pearson DO   norethindrone-ethinyl estradiol ([address] FE 1/20) 1-20 MG-MCG per tablet Take 1 tablet by mouth daily 7/6/21   SHEILA Gonzalez NP   norethindrone-ethinyl estradiol-Fe (LO LOESTRIN FE) 1 MG-10 MCG / 10 MCG tablet Take 1 tablet by mouth daily 7/1/21   Phil Hazel DO   ibuprofen (ADVIL;MOTRIN) 600 MG tablet Take 1 tablet by mouth every 6 hours as needed for Pain 2/9/19   Stephenie Marley DO        Allergies:     Patient has no known allergies. Social History:     Tobacco:    reports that she has been smoking cigars. She started smoking about 18 years ago. She has never used smokeless tobacco.  Alcohol:      reports current alcohol use. Drug Use:  reports previous drug use. Drug: Marijuana. Family History:     Family History   Problem Relation Age of Onset    Diabetes Mother        Review of Systems:     Positive and Negative as described in HPI. Review of Systems   Constitutional: Positive for activity change, chills, fatigue and fever. Negative for diaphoresis. HENT: Negative for congestion and dental problem. Respiratory: Positive for cough and shortness of breath.  Negative for wheezing. Cardiovascular: Positive for chest pain. Negative for palpitations and leg swelling. Gastrointestinal: Positive for constipation. Negative for abdominal distention, abdominal pain and diarrhea. Endocrine: Negative for cold intolerance and heat intolerance. Genitourinary: Negative for difficulty urinating and dysuria. Musculoskeletal: Positive for arthralgias and myalgias. Negative for gait problem and joint swelling. Skin: Negative for color change and pallor. Neurological: Positive for dizziness and weakness. Negative for tremors and syncope. Psychiatric/Behavioral: Negative for agitation, behavioral problems and confusion. Physical Exam:   BP (!) 146/104   Pulse 85   Temp 97.4 °F (36.3 °C) (Oral)   Resp 22   Ht 5' 6\" (1.676 m)   Wt (!) 437 lb 2.8 oz (198.3 kg)   SpO2 93%   BMI 70.56 kg/m²   Temp (24hrs), Av.7 °F (37.6 °C), Min:97.4 °F (36.3 °C), Max:102.9 °F (39.4 °C)    No results for input(s): POCGLU in the last 72 hours. Intake/Output Summary (Last 24 hours) at 2021 1407  Last data filed at 2021 0957  Gross per 24 hour   Intake 1000 ml   Output 550 ml   Net 450 ml       Physical Exam  Constitutional:       General: She is in acute distress. Appearance: She is obese. She is ill-appearing. She is not toxic-appearing or diaphoretic. HENT:      Head: Normocephalic and atraumatic. Nose: Nose normal.      Mouth/Throat:      Mouth: Mucous membranes are moist.   Cardiovascular:      Rate and Rhythm: Regular rhythm. Tachycardia present. Pulmonary:      Effort: Pulmonary effort is normal.      Breath sounds: Rhonchi present. Abdominal:      General: Abdomen is flat. Bowel sounds are normal.   Musculoskeletal:         General: Normal range of motion. Cervical back: Normal range of motion. Right lower leg: Edema present. Left lower leg: Edema present. Comments: 1+edema   Skin:     General: Skin is warm.    Neurological: General: No focal deficit present. Mental Status: She is alert and oriented to person, place, and time. Investigations:      Laboratory Testing:  Recent Results (from the past 24 hour(s))   Culture, Blood 1    Collection Time: 08/22/21  9:05 PM    Specimen: Blood   Result Value Ref Range    Specimen Description . BLOOD     Special Requests R AC 16 ML     Culture NO GROWTH 13 HOURS    Culture, Blood 2    Collection Time: 08/22/21  9:10 PM    Specimen: Blood   Result Value Ref Range    Specimen Description . BLOOD     Special Requests L AC 18 ML     Culture NO GROWTH 13 HOURS    EKG 12 Lead    Collection Time: 08/22/21  9:23 PM   Result Value Ref Range    Ventricular Rate 104 BPM    Atrial Rate 104 BPM    P-R Interval 146 ms    QRS Duration 88 ms    Q-T Interval 328 ms    QTc Calculation (Bazett) 431 ms    P Axis 39 degrees    R Axis 11 degrees    T Axis -2 degrees   CBC Auto Differential    Collection Time: 08/22/21  9:33 PM   Result Value Ref Range    WBC 5.8 3.5 - 11.3 k/uL    RBC 3.49 (L) 3.95 - 5.11 m/uL    Hemoglobin 8.7 (L) 11.9 - 15.1 g/dL    Hematocrit 28.8 (L) 36.3 - 47.1 %    MCV 82.5 (L) 82.6 - 102.9 fL    MCH 24.9 (L) 25.2 - 33.5 pg    MCHC 30.2 28.4 - 34.8 g/dL    RDW 15.9 (H) 11.8 - 14.4 %    Platelets 803 181 - 153 k/uL    MPV 11.6 8.1 - 13.5 fL    NRBC Automated 0.0 0.0 per 100 WBC    Differential Type NOT REPORTED     Seg Neutrophils 72 (H) 36 - 65 %    Lymphocytes 18 (L) 24 - 43 %    Monocytes 9 3 - 12 %    Eosinophils % 1 1 - 4 %    Basophils 0 0 - 2 %    Immature Granulocytes 0 0 %    Segs Absolute 4.12 1.50 - 8.10 k/uL    Absolute Lymph # 1.05 (L) 1.10 - 3.70 k/uL    Absolute Mono # 0.53 0.10 - 1.20 k/uL    Absolute Eos # 0.03 0.00 - 0.44 k/uL    Basophils Absolute <0.03 0.00 - 0.20 k/uL    Absolute Immature Granulocyte <0.03 0.00 - 0.30 k/uL    WBC Morphology NOT REPORTED     RBC Morphology ANISOCYTOSIS PRESENT     Platelet Estimate NOT REPORTED    Comprehensive Metabolic Panel w/ Reflex to MG    Collection Time: 08/22/21  9:33 PM   Result Value Ref Range    Glucose 105 (H) 70 - 99 mg/dL    BUN 5 (L) 6 - 20 mg/dL    CREATININE 0.88 0.50 - 0.90 mg/dL    Bun/Cre Ratio NOT REPORTED 9 - 20    Calcium 8.2 (L) 8.6 - 10.4 mg/dL    Sodium 137 135 - 144 mmol/L    Potassium 3.8 3.7 - 5.3 mmol/L    Chloride 102 98 - 107 mmol/L    CO2 24 20 - 31 mmol/L    Anion Gap 11 9 - 17 mmol/L    Alkaline Phosphatase 52 35 - 104 U/L    ALT 29 5 - 33 U/L    AST 41 (H) <32 U/L    Total Bilirubin <0.10 (L) 0.3 - 1.2 mg/dL    Total Protein 7.1 6.4 - 8.3 g/dL    Albumin 3.6 3.5 - 5.2 g/dL    Albumin/Globulin Ratio 1.0 1.0 - 2.5    GFR Non-African American >60 >60 mL/min    GFR African American >60 >60 mL/min    GFR Comment          GFR Staging NOT REPORTED    SPECIMEN REJECTION    Collection Time: 08/22/21  9:33 PM   Result Value Ref Range    Specimen Source . BLOOD     Ordered Test LACDS     Reason for Rejection Unable to perform testing: Specimen clotted.      - NOT REPORTED    Procalcitonin    Collection Time: 08/22/21  9:33 PM   Result Value Ref Range    Procalcitonin 0.09 (H) <0.09 ng/mL   Lactic Acid, Whole Blood    Collection Time: 08/22/21 10:33 PM   Result Value Ref Range    Lactic Acid, Whole Blood 0.8 0.7 - 2.1 mmol/L   Urinalysis Reflex to Culture    Collection Time: 08/22/21 11:56 PM    Specimen: Urine, clean catch   Result Value Ref Range    Color, UA YELLOW YELLOW    Turbidity UA CLEAR CLEAR    Glucose, Ur NEGATIVE NEGATIVE    Bilirubin Urine NEGATIVE NEGATIVE    Ketones, Urine NEGATIVE NEGATIVE    Specific Gravity, UA 1.015 1.005 - 1.030    Urine Hgb LARGE (A) NEGATIVE    pH, UA 5.5 5.0 - 8.0    Protein, UA NEGATIVE NEGATIVE    Urobilinogen, Urine Normal Normal    Nitrite, Urine NEGATIVE NEGATIVE    Leukocyte Esterase, Urine NEGATIVE NEGATIVE    Urinalysis Comments NOT REPORTED    Microscopic Urinalysis    Collection Time: 08/22/21 11:56 PM   Result Value Ref Range    -          WBC, UA 5 TO 10 0 - 5 /HPF RBC, UA 10 TO 20 0 - 4 /HPF    Casts UA  0 - 8 /LPF     2 TO 5 HYALINE Reference range defined for non-centrifuged specimen. Crystals, UA NOT REPORTED None /HPF    Epithelial Cells UA 5 TO 10 0 - 5 /HPF    Renal Epithelial, UA NOT REPORTED 0 /HPF    Bacteria, UA FEW (A) None    Mucus, UA NOT REPORTED None    Trichomonas, UA NOT REPORTED None    Amorphous, UA NOT REPORTED None    Other Observations UA NOT REPORTED NOT REQ. Yeast, UA NOT REPORTED None   Legionella antigen, urine    Collection Time: 08/22/21 11:56 PM   Result Value Ref Range    Legionella Pneumophilia Ag, Urine NEGATIVE    Strep Pneumoniae Antigen    Collection Time: 08/22/21 11:56 PM   Result Value Ref Range    Source . Random Urine     Strep pneumo Ag NEGATIVE    Lactate, Sepsis    Collection Time: 08/23/21  1:18 AM   Result Value Ref Range    Lactic Acid, Sepsis NOT REPORTED 0.5 - 1.9 mmol/L    Lactic Acid, Sepsis, Whole Blood 0.9 0.5 - 1.9 mmol/L   CBC Auto Differential    Collection Time: 08/23/21 10:21 AM   Result Value Ref Range    WBC 4.2 3.5 - 11.3 k/uL    RBC 4.21 3.95 - 5.11 m/uL    Hemoglobin 10.4 (L) 11.9 - 15.1 g/dL    Hematocrit 34.8 (L) 36.3 - 47.1 %    MCV 82.7 82.6 - 102.9 fL    MCH 24.7 (L) 25.2 - 33.5 pg    MCHC 29.9 28.4 - 34.8 g/dL    RDW 15.6 (H) 11.8 - 14.4 %    Platelets 514 395 - 826 k/uL    MPV 11.1 8.1 - 13.5 fL    NRBC Automated 0.0 0.0 per 100 WBC    Differential Type NOT REPORTED     WBC Morphology NOT REPORTED     RBC Morphology ANISOCYTOSIS PRESENT     Platelet Estimate NOT REPORTED     Seg Neutrophils 77 (H) 36 - 65 %    Lymphocytes 19 (L) 24 - 43 %    Monocytes 4 3 - 12 %    Eosinophils % 0 (L) 1 - 4 %    Basophils 0 0 - 2 %    Immature Granulocytes 0 0 %    Segs Absolute 3.21 1.50 - 8.10 k/uL    Absolute Lymph # 0.77 (L) 1.10 - 3.70 k/uL    Absolute Mono # 0.18 0.10 - 1.20 k/uL    Absolute Eos # <0.03 0.00 - 0.44 k/uL    Basophils Absolute <0.03 0.00 - 0.20 k/uL    Absolute Immature Granulocyte <0.03 0.00 - 0.30 k/uL   Comprehensive Metabolic Panel w/ Reflex to MG    Collection Time: 08/23/21 10:21 AM   Result Value Ref Range    Glucose 130 (H) 70 - 99 mg/dL    BUN 6 6 - 20 mg/dL    CREATININE 0.73 0.50 - 0.90 mg/dL    Bun/Cre Ratio NOT REPORTED 9 - 20    Calcium 8.6 8.6 - 10.4 mg/dL    Sodium 142 135 - 144 mmol/L    Potassium 4.4 3.7 - 5.3 mmol/L    Chloride 107 98 - 107 mmol/L    CO2 22 20 - 31 mmol/L    Anion Gap 13 9 - 17 mmol/L    Alkaline Phosphatase 51 35 - 104 U/L    ALT 31 5 - 33 U/L    AST 45 (H) <32 U/L    Total Bilirubin <0.10 (L) 0.3 - 1.2 mg/dL    Total Protein 7.4 6.4 - 8.3 g/dL    Albumin 3.4 (L) 3.5 - 5.2 g/dL    Albumin/Globulin Ratio 0.9 (L) 1.0 - 2.5    GFR Non-African American >60 >60 mL/min    GFR African American >60 >60 mL/min    GFR Comment          GFR Staging NOT REPORTED    Protime-INR    Collection Time: 08/23/21 10:21 AM   Result Value Ref Range    Protime 9.8 9.1 - 12.3 sec    INR 0.9    APTT    Collection Time: 08/23/21 10:21 AM   Result Value Ref Range    PTT 24.4 20.5 - 30.5 sec   Fibrinogen    Collection Time: 08/23/21 10:21 AM   Result Value Ref Range    Fibrinogen 510 (H) 140 - 420 mg/dL   Ferritin    Collection Time: 08/23/21 10:21 AM   Result Value Ref Range    Ferritin 42 13 - 150 ug/L   D-Dimer, Quantitative    Collection Time: 08/23/21 10:21 AM   Result Value Ref Range    D-Dimer, Quant 1.23 mg/L FEU   Troponin    Collection Time: 08/23/21 10:21 AM   Result Value Ref Range    Troponin, High Sensitivity <6 0 - 14 ng/L    Troponin T NOT REPORTED <0.03 ng/mL    Troponin Interp NOT REPORTED    Lactic Acid, Plasma    Collection Time: 08/23/21 10:21 AM   Result Value Ref Range    Lactic Acid NOT REPORTED mmol/L    Lactic Acid, Whole Blood 1.3 0.7 - 2.1 mmol/L   Vitamin D 25 Hydroxy    Collection Time: 08/23/21 10:21 AM   Result Value Ref Range    Vit D, 25-Hydroxy 7.0 (L) 30.0 - 100.0 ng/mL   Iron and TIBC    Collection Time: 08/23/21 10:21 AM   Result Value Ref Range    Iron 23 (L) 37 - 145 ug/dL    TIBC 352 250 - 450 ug/dL    Iron Saturation 7 (L) 20 - 55 %    UIBC 329 112 - 347 ug/dL   Respiratory Culture    Collection Time: 08/23/21 11:22 AM    Specimen: Sputum Expectorated   Result Value Ref Range    Specimen Description . EXPECTORATED SPUTUM     Special Requests NOT REPORTED     Direct Exam < 10 EPITHELIAL CELLS/LPF     Direct Exam <10 NEUTROPHILS/LPF     Direct Exam MIXED BACTERIAL MORPHOTYPES SEEN ON GRAM STAIN. (A)     Culture PENDING    Sputum gram stain    Collection Time: 08/23/21 11:23 AM    Specimen: Sputum Expectorated   Result Value Ref Range    Specimen Description . EXPECTORATED SPUTUM     Special Requests NOT REPORTED     Direct Exam CANCELLED DUPLICATE ORDER    Culture, Respiratory, Sputum    Collection Time: 08/23/21 11:23 AM    Specimen: Sputum Expectorated   Result Value Ref Range    Specimen Description . EXPECTORATED SPUTUM     Special Requests NOT REPORTED     Direct Exam NOT REPORTED     Culture CANCELLED DUPLICATE ORDER        Imaging/Diagnostics:  XR CHEST PORTABLE    Result Date: 8/22/2021  Mild interstitial infiltrates unchanged     XR CHEST PORTABLE    Result Date: 8/21/2021  Patchy bilateral airspace disease suggestive of multifocal inflammatory process or infection. Assessment :      Hospital Problems         Last Modified POA    Morbid obesity ([address] 8/23/2021 Yes    Pneumonia due to COVID-19 virus 8/22/2021 Yes    Iron deficiency anemia due to chronic blood loss 8/23/2021 Yes    Sepsis with acute hypoxic respiratory failure without septic shock ([address] 8/23/2021 Yes    Vitamin D deficiency 8/23/2021 Yes          Plan:     Patient status inpatient in the Med/Surge    COVID-19 pneumonia with acute respiratory failurepatient desatted to 88% at home, has been on room air in the hospital, CRP pending. Follow-up with inflammatory markers. Start Decadron 6 mg for 10 days. Infectious disease consulted. Consider remdesivir.   Continue oxygen therapy protocol as needed. Add tessalon pearls for cough. Sepsis secondary to COVID-19 pneumonialactic acid negative, blood cultures pending, respiratory cultures sent, Legionella negative, strep pneumoniae negative, discontinue azithromycin and Rocephin. Monitor off antibiotics. Vitamin D deficiencystart vitamin D supplementation. Chronic Iron deficiency anemiahemoglobin 10.4, follows up with outpatient OB/GYN for menorrhagia. Give 1 dose Venofer, start oral iron supplementation from tomorrow. Morbid obesityBMI of 70, lifestyle modifications, consider bariatric surgery referral outpatient. dvt prop- on lovenox    Consultations:   IP CONSULT TO HOSPITALIST  IP CONSULT TO INFECTIOUS DISEASES     Patient is admitted as inpatient status because of co-morbidities listed above, severity of signs and symptoms as outlined, requirement for current medical therapies and most importantly because of direct risk to patient if care not provided in a hospital setting. Expected length of stay > 48 hours. Rossy Casillas MD  8/23/2021  2:07 PM    Copy sent to Dr. aBrragan primary care provider on file.

## 2021-08-23 NOTE — PROGRESS NOTES
Physical Therapy    Facility/Department: [facility]  Initial Assessment    NAME: Roman Houston  : 1990  MRN: [id number]    No chief complaint on file. -COVID    Date of Service: 2021    Discharge Recommendations:    Further therapy recommended at discharge. PT Equipment Recommendations  Other: CTA, possible SPC    Assessment   Body structures, Functions, Activity limitations: Decreased functional mobility ; Decreased strength;Decreased endurance;Decreased balance  Assessment: Pt grossly CGA to [place], amb 25' x2 HHA [place]. Seated rest break ~5min required between ambulation. Pt would benefit from continued acute PT to address deficits. Prognosis: Good  Decision Making: Medium Complexity  PT Education: PT Role;Plan of Care;General Safety  REQUIRES PT FOLLOW UP: Yes  Activity Tolerance  Activity Tolerance: Patient limited by fatigue;Patient limited by endurance       Patient Diagnosis(es): The encounter diagnosis was COVID-19.     has a past medical history of Depression. has a past surgical history that includes Onward tooth extraction (Left, ). Restrictions  Restrictions/Precautions  Restrictions/Precautions: General Precautions, Fall Risk, Isolation, Up as Tolerated (COVID)  Required Braces or Orthoses?: No  Vision/Hearing  Vision: Impaired  Vision Exceptions:  (needs to get glasses for distance, does not have them)  Hearing: Within functional limits     Subjective  General  Chart Reviewed: Yes  Patient assessed for rehabilitation services?: Yes  Response To Previous Treatment: Not applicable  Family / Caregiver Present: No  Follows Commands: Within Functional Limits  General Comment  Comments: OT co-eval  Subjective  Subjective: RN and pt agreeable to PT.  Pt alert in bed upon arrival.  Pain Screening  Patient Currently in Pain: Denies  Vital Signs  Patient Currently in Pain: Denies  Pre Treatment Pain Screening  Intervention List: Patient able to continue with Recommendations: Strengthening, Balance Training, Endurance Training, Functional Mobility Training, Transfer Training, Gait Training, Stair training, Home Exercise Program, Safety Education & Training, Patient/Caregiver Education & Training, Equipment Evaluation, Education, & procurement  Safety Devices  Type of devices: Call light within reach, Nurse notified, Gait belt, Patient at risk for falls, All fall risk precautions in place, Left in chair  Restraints  Initially in place: No    AM-PAC Score  AM-PAC Inpatient Mobility Raw Score : 18 (08/23/21 1329)  AM-PAC Inpatient T-Scale Score : 43.63 (08/23/21 1329)  Mobility Inpatient CMS 0-100% Score: 46.58 (08/23/21 1329)  Mobility Inpatient CMS G-Code Modifier : CK (08/23/21 1329)          Goals  Short term goals  Time Frame for Short term goals: 14 visits  Short term goal 1: Pt will be Aye bed mobility  Short term goal 2: Pt will be Aye transfers  Short term goal 3: Pt will be Aye amb 240'  Short term goal 4: Pt will navigate 2 steps Aye       Therapy Time   Individual Concurrent Group Co-treatment   Time In 1125         Time Out 1156         Minutes 31         Timed Code Treatment Minutes: 21 Minutes       Steffanie Rich, PT

## 2021-08-23 NOTE — ED NOTES
Pt presents to ER for increased SOB and low O2 saturations.  was seen in the ER Saturday for cc of nausea fatigue, fevers and dyspena for the past 4 days and was diagnosed with COVID. She was felt well enough to go home and was discharged with a SPO2 monitor and told to return if symptoms worsen or pulse Ox <= 88%.       Lakisha Reyez RN  08/23/21 [id number]

## 2021-08-23 NOTE — ED PROVIDER NOTES
[facility] ED     Emergency Department     Faculty Attestation    I performed a history and physical examination of the patient and discussed management with the resident. I reviewed the residents note and agree with the documented findings and plan of care. Any areas of disagreement are noted on the chart. I was personally present for the key portions of any procedures. I have documented in the chart those procedures where I was not present during the key portions. I have reviewed the emergency nurses triage note. I agree with the chief complaint, past medical history, past surgical history, allergies, medications, social and family history as documented unless otherwise noted below. For Physician Assistant/ Nurse Practitioner cases/documentation I have personally evaluated this patient and have completed at least one if not all key elements of the E/M (history, physical exam, and MDM). Additional findings are as noted. This patient was evaluated in the Emergency Department for symptoms described in the history of present illness. He/she was evaluated in the context of the global COVID-19 pandemic, which necessitated consideration that the patient might be at risk for infection with the SARS-CoV-2 virus that causes COVID-19. Institutional protocols and algorithms that pertain to the evaluation of patients at risk for COVID-19 are in a state of rapid change based on information released by regulatory bodies including the CDC and federal and state organizations. These policies and algorithms were followed during the patient's care in the ED.     Seen yesterday for respiratory complaints fever cough diagnosed with Covid, discharged home because not hypoxic and passed bedside march test.  Today returns for worsening shortness of breath, she was discharged home last night with a pulse oximeter, states sats were as low as 88% at home at rest.  Despite taking Tylenol repeated without the days still febrile here. Patient is tachypneic more so than yesterday when I saw her speaking in nearly complete sentences but not full sentences.   Will plan for admission for worsening Covid symptoms with hypoxia      Critical Care     none    Lan Hoskins MD, [facility] [place]  Attending Emergency  Physician             Lan Hoskins MD  08/22/21 0095

## 2021-08-23 NOTE — DISCHARGE INSTR - COC
Continuity of Care Form    Patient Name: Alison Armenta   :  1990  MRN:  [id number]    Admit date:  2021  Discharge date:  ***    Code Status Order: No Order   Advance Directives:     Admitting Physician:  Jered Camacho MD  PCP: No primary care provider on file. Discharging Nurse: [facility] Unit/Room#: 50PED/50PED  Discharging Unit Phone Number: ***    Emergency Contact:   Extended Emergency Contact Information  Primary Emergency Contact: Chasidy Gallo of [address] [place] [address] Phone: [phone]  Relation: Brother/Sister  Secondary Emergency Contact: [address], [address] Argueta Phone: [phone]  Relation: Parent    Past Surgical History:  Past Surgical History:   Procedure Laterality Date    WISDOM TOOTH EXTRACTION Left        Immunization History: There is no immunization history on file for this patient.     Active Problems:  Patient Active Problem List   Diagnosis Code    Abnormal uterine bleeding N93.9    Mirena IUD Z30.430    Tobacco abuse Z72.0    Marijuana abuse F12.10    Chronic hypertension (no meds) I10    Attention deficit hyperactivity disorder (ADHD) F90.9    Contraceptive surveillance Z30.40    Depression F32.9    Dysplasia of cervix N87.9    Morbid obesity ([facility] [address] E66.01    Seasonal allergies J30.2    Secondary amenorrhea N91.1    Pneumonia due to COVID-19 virus U07.1, J12.82       Isolation/Infection:   Isolation          No Isolation        Patient Infection Status     Infection Onset Added Last Indicated Last Indicated By Review Planned Expiration Resolved Resolved By    COVID-19 21 COVID-19, Rapid 21      Resolved    COVID-19 Rule Out 21 COVID-19, Rapid (Ordered)   21 Rule-Out Test Resulted          Nurse Assessment:  Last Vital Signs: BP (!) 140/97   Pulse 98   Temp 99.5 °F (37.5 °C) (Oral)   Resp 20   Ht 5' 6\" (1.676 m)   Wt (!) 400 lb (181.4 kg)   SpO2 95%   BMI {Esignature:059770764}    CASE MANAGEMENT/SOCIAL WORK SECTION    Inpatient Status Date: ***    Readmission Risk Assessment Score:  Readmission Risk              Risk of Unplanned Readmission:  0           Discharging to Facility/ Agency   · Name:   · Address:  · Phone:  · Fax:    Dialysis Facility (if applicable)   · Name:  · Address:  · Dialysis Schedule:  · Phone:  · Fax:    / signature: {Esignature:230023865}    PHYSICIAN SECTION    Prognosis: {Prognosis:6979017506}    Condition at Discharge: [address] [place] [address] Patient Condition:931713286}    Rehab Potential (if transferring to Rehab): {Prognosis:2985069471}    Recommended Labs or Other Treatments After Discharge: ***    Physician Certification: I certify the above information and transfer of Renetta Martinez  is necessary for the continuing treatment of the diagnosis listed and that she requires {Admit to Appropriate Level of Care:90999} for {GREATER/LESS:014238065} 30 days.      Update Admission H&P: {CHP DME Changes in KFYUJ:365714227}    PHYSICIAN SIGNATURE:  {Esignature:[id number]}

## 2021-08-23 NOTE — ED PROVIDER NOTES
[facility] ED  Emergency Department  Emergency Medicine     Care of Anjelica Loza was assumed from Dr. Ross Jackson and is being seen for No chief complaint on file. .  The patient's initial evaluation and plan have been discussed with the prior provider who initially evaluated the patient. EMERGENCY DEPARTMENT COURSE / MEDICAL DECISION MAKING:       MEDICATIONS GIVEN:  Orders Placed This Encounter   Medications    0.9 % sodium chloride bolus    ibuprofen (ADVIL;MOTRIN) tablet 800 mg    albuterol sulfate  (90 Base) MCG/ACT inhaler 2 puff     Order Specific Question:   Initiate RT Bronchodilator Protocol     Answer: Yes    benzonatate (TESSALON) capsule 100 mg    dexamethasone (DECADRON) tablet 10 mg       LABS / RADIOLOGY:     Labs Reviewed   CBC WITH AUTO DIFFERENTIAL - Abnormal; Notable for the following components:       Result Value    RBC 3.49 (*)     Hemoglobin 8.7 (*)     Hematocrit 28.8 (*)     MCV 82.5 (*)     MCH 24.9 (*)     RDW 15.9 (*)     Seg Neutrophils 72 (*)     Lymphocytes 18 (*)     Absolute Lymph # 1.05 (*)     All other components within normal limits   COMPREHENSIVE METABOLIC PANEL W/ REFLEX TO MG FOR LOW K - Abnormal; Notable for the following components:    Glucose 105 (*)     BUN 5 (*)     Calcium 8.2 (*)     AST 41 (*)     Total Bilirubin <0.10 (*)     All other components within normal limits   CULTURE, BLOOD 1   CULTURE, BLOOD 2   SPECIMEN REJECTION   LACTIC ACID, WHOLE BLOOD   LACTATE, SEPSIS   URINE RT REFLEX TO CULTURE   PREVIOUS SPECIMEN       XR CHEST PORTABLE    Result Date: 8/22/2021  EXAMINATION: ONE XRAY VIEW OF THE CHEST 8/22/2021 9:22 pm COMPARISON: August 21, 2021 HISTORY: ORDERING SYSTEM PROVIDED HISTORY: shortness of breath TECHNOLOGIST PROVIDED HISTORY: shortness of breath Reason for Exam: upr Acuity: Unknown Type of Exam: Unknown FINDINGS: Bilateral mild interstitial infiltrates or edema.   Heart and mediastinum normal.  Bony thorax intact. Mild interstitial infiltrates unchanged     XR CHEST PORTABLE    Result Date: 8/21/2021  EXAMINATION: ONE XRAY VIEW OF THE CHEST 8/21/2021 4:46 pm COMPARISON: None. HISTORY: ORDERING SYSTEM PROVIDED HISTORY: cough, sob, concern for covid TECHNOLOGIST PROVIDED HISTORY: cough, sob, concern for covid Reason for Exam: upr Acuity: Unknown Type of Exam: Unknown FINDINGS: Shallow inflation. The cardiac and mediastinal contours appear within normal limits. Patchy bilateral airspace disease is noted. No pneumothorax, evidence for edema or effusion. No acute osseous abnormality identified. Patchy bilateral airspace disease suggestive of multifocal inflammatory process or infection. RECENT VITALS:     Temp: 100.3 °F (37.9 °C),  Pulse: 100, Resp: 22, BP: (!) 133/91, SpO2: 96 %         This patient is a 27 y.o. Female who presented with worsening shortness of breath. Patient was seen yesterday and diagnosed with COVID-19. Oxygen saturation 90 December sent home so patient return to the emergency department. Chest x-ray does reveal Covid pneumonia. Patient has new oxygen requirement. Received dose of steroids in the emergency department as well as breathing treatment. [facility] accepted admission of the patient. OUTSTANDING TASKS / RECOMMENDATIONS:    1. Admission     FINAL IMPRESSION:     1. COVID-19        DISPOSITION:         DISPOSITION:  []  Discharge   []  Transfer -    [x]  Admission -     []  Against Medical Advice   []  Eloped   FOLLOW-UP: No follow-up provider specified.    DISCHARGE MEDICATIONS: New Prescriptions    No medications on file          Marshall Amaya DO  Emergency Medicine        Marshall Amaya DO  Resident  08/22/21 [id number]  [facility] DO Brunilda  Resident  08/23/21 [id number]

## 2021-08-23 NOTE — PROGRESS NOTES
activity tolerance)  Activity: Other  Assist Level: Stand by assistance  Functional Mobility Comments: Balance concerns, aggressive coughing and SOB quickly, required long seated rest break after short func mob in room  ADL  Feeding: Independent  Grooming: Modified independent   UE Bathing: Supervision  LE Bathing: Supervision  UE Dressing: Supervision  LE Dressing: Supervision  Toileting: Supervision  Additional Comments: Pt significantly fatigued with minimal movement, fair figure-4 technique noted with BLE during dressing simulation, pt able to drink and spit into cups this date while sitting in chair  Tone RUE  RUE Tone: Normotonic  Tone LUE  LUE Tone: Normotonic  Coordination  Movements Are Fluid And Coordinated: Yes (UE's)     Bed mobility  Supine to Sit: Supervision  Sit to Supine: Unable to assess  Scooting: Modified independent  Comment: Pt supine in bed upon arrival, retired in recliner with call light upon exit required rest break after transferring to EOB  Transfers  Sit to stand: Supervision  Stand to sit: Supervision     Cognition  Overall Cognitive Status: WFL        Sensation  Overall Sensation Status: WFL      LUE AROM (degrees)  LUE AROM : WFL  RUE AROM (degrees)  RUE AROM : WFL  LUE Strength  Gross LUE Strength: WFL  L Hand General: 5/5  RUE Strength  Gross RUE Strength: WFL  R Hand General: 5/5      Plan   Plan  Times per week: 3-5x    AM-PAC Score        AM-[facility] Inpatient Daily Activity Raw Score: 18 (08/23/21 1501)  AM-PAC Inpatient ADL T-Scale Score : 38.66 (08/23/21 1501)  ADL Inpatient CMS 0-100% Score: 46.65 (08/23/21 1501)  ADL Inpatient CMS G-Code Modifier : CK (08/23/21 1501)    Goals  Short term goals  Time Frame for Short term goals: Pt will by discharge  Short term goal 1: demo good safety awareness during func mob around room (I)  Short term goal 2: demo activity tolerance for 30 min+  Short term goal 3: demo/identify 2 EC/WS techniques during func activity with 1 vc only  Short term goal 4: demo standing tolerance for 10 min + (I) during func activity with no rest breaks  Short term goal 5: demo ADL UB/LB dressing/bathing activity with increased time and mod I     Therapy Time   Individual Concurrent Group Co-treatment   Time In 1128         Time Out 1153         Minutes 25         Timed Code Treatment Minutes: 10 Minutes       Anthony Saravia OTR/L

## 2021-08-23 NOTE — PLAN OF CARE
Problem: Airway Clearance - Ineffective  Goal: Achieve or maintain patent airway  8/23/2021 1747 by Brice Vazquez RN  Outcome: Ongoing  8/23/2021 0622 by Angela Cortes RN  Outcome: Ongoing     Problem: Gas Exchange - Impaired  Goal: Absence of hypoxia  8/23/2021 1747 by Brice Vazquez RN  Outcome: Ongoing  8/23/2021 0622 by Angela Cortes RN  Outcome: Ongoing  Goal: Promote optimal lung function  8/23/2021 1747 by Brice Vazquez RN  Outcome: Ongoing  8/23/2021 0622 by Angela Cortes RN  Outcome: Ongoing     Problem: Breathing Pattern - Ineffective  Goal: Ability to achieve and maintain a regular respiratory rate  8/23/2021 1747 by Brice Vazquez RN  Outcome: Ongoing  8/23/2021 0622 by Angela Cortes RN  Outcome: Ongoing     Problem:  Body Temperature -  Risk of, Imbalanced  Goal: Complications related to the disease process, condition or treatment will be avoided or minimized  8/23/2021 1747 by Brice Vazquez RN  Outcome: Ongoing  8/23/2021 0622 by Angela Cortes RN  Outcome: Ongoing     Problem: Isolation Precautions - Risk of Spread of Infection  Goal: Prevent transmission of infection  8/23/2021 1747 by Brice Vazquez RN  Outcome: Ongoing  8/23/2021 0622 by Angela Cortes RN  Outcome: Ongoing     Problem: Nutrition Deficits  Goal: Optimize nutritional status  8/23/2021 1747 by Brice Vazquez RN  Outcome: Ongoing  8/23/2021 0622 by Angela Cortes RN  Outcome: Ongoing     Problem: Risk for Fluid Volume Deficit  Goal: Maintain normal heart rhythm  8/23/2021 1747 by Brice Vazquez RN  Outcome: Ongoing  8/23/2021 0622 by Angela Cortes RN  Outcome: Ongoing  Goal: Maintain absence of muscle cramping  8/23/2021 1747 by Brice Vazquez RN  Outcome: Ongoing  8/23/2021 0622 by Angela Cortes RN  Outcome: Ongoing  Goal: Maintain normal serum potassium, sodium, calcium, phosphorus, and pH  8/23/2021 1747 by Brice Vazquez RN  Outcome: Ongoing  8/23/2021 0622 by Angela Cortes RN  Outcome: Ongoing     Problem: Loneliness or Risk for Loneliness  Goal: Demonstrate positive use of time alone when socialization is not possible  8/23/2021 1747 by Farhat Edward RN  Outcome: Ongoing  8/23/2021 0622 by Jaycee Pineda RN  Outcome: Ongoing     Problem: Fatigue  Goal: Verbalize increase energy and improved vitality  8/23/2021 1747 by Farhat Edward RN  Outcome: Ongoing  8/23/2021 0622 by Jaycee Pineda RN  Outcome: Ongoing     Problem: Patient Education: Go to Patient Education Activity  Goal: Patient/Family Education  8/23/2021 1747 by Farhat Edward RN  Outcome: Ongoing  8/23/2021 0622 by Jaycee Pineda RN  Outcome: Ongoing     Problem: Falls - Risk of:  Goal: Will remain free from falls  Description: Will remain free from falls  8/23/2021 1747 by Farhat Edward RN  Outcome: Ongoing  8/23/2021 0622 by Jaycee Pineda RN  Outcome: Ongoing  Goal: Absence of physical injury  Description: Absence of physical injury  8/23/2021 1747 by Farhat Edward RN  Outcome: Ongoing  8/23/2021 0622 by Jaycee Pineda RN  Outcome: Ongoing   Questions and concerns addressed as needed with pt at bedside.

## 2021-08-24 LAB
ALBUMIN SERPL-MCNC: 3.4 G/DL (ref 3.5–5.2)
ALBUMIN/GLOBULIN RATIO: 0.9 (ref 1–2.5)
ALP BLD-CCNC: 52 U/L (ref 35–104)
ALT SERPL-CCNC: 49 U/L (ref 5–33)
AST SERPL-CCNC: 66 U/L
BILIRUB SERPL-MCNC: <0.1 MG/DL (ref 0.3–1.2)
BILIRUBIN DIRECT: <0.08 MG/DL
BILIRUBIN, INDIRECT: ABNORMAL MG/DL (ref 0–1)
C-REACTIVE PROTEIN: 17.7 MG/L (ref 0–5)
CULTURE: ABNORMAL
D-DIMER QUANTITATIVE: 1 MG/L FEU
DIRECT EXAM: ABNORMAL
FERRITIN: 111 UG/L (ref 13–150)
GLOBULIN: ABNORMAL G/DL (ref 1.5–3.8)
Lab: ABNORMAL
SPECIMEN DESCRIPTION: ABNORMAL
TOTAL PROTEIN: 7.2 G/DL (ref 6.4–8.3)

## 2021-08-24 PROCEDURE — 2580000003 HC RX 258: Performed by: INTERNAL MEDICINE

## 2021-08-24 PROCEDURE — 86140 C-REACTIVE PROTEIN: CPT

## 2021-08-24 PROCEDURE — 94760 N-INVAS EAR/PLS OXIMETRY 1: CPT

## 2021-08-24 PROCEDURE — 2580000003 HC RX 258: Performed by: NURSE PRACTITIONER

## 2021-08-24 PROCEDURE — 82728 ASSAY OF FERRITIN: CPT

## 2021-08-24 PROCEDURE — 6360000002 HC RX W HCPCS: Performed by: STUDENT IN AN ORGANIZED HEALTH CARE EDUCATION/TRAINING PROGRAM

## 2021-08-24 PROCEDURE — 80076 HEPATIC FUNCTION PANEL: CPT

## 2021-08-24 PROCEDURE — 85379 FIBRIN DEGRADATION QUANT: CPT

## 2021-08-24 PROCEDURE — 6370000000 HC RX 637 (ALT 250 FOR IP): Performed by: EMERGENCY MEDICINE

## 2021-08-24 PROCEDURE — 99232 SBSQ HOSP IP/OBS MODERATE 35: CPT | Performed by: STUDENT IN AN ORGANIZED HEALTH CARE EDUCATION/TRAINING PROGRAM

## 2021-08-24 PROCEDURE — 99232 SBSQ HOSP IP/OBS MODERATE 35: CPT | Performed by: INTERNAL MEDICINE

## 2021-08-24 PROCEDURE — 1200000000 HC SEMI PRIVATE

## 2021-08-24 PROCEDURE — 97110 THERAPEUTIC EXERCISES: CPT

## 2021-08-24 PROCEDURE — 6370000000 HC RX 637 (ALT 250 FOR IP): Performed by: NURSE PRACTITIONER

## 2021-08-24 PROCEDURE — 2500000003 HC RX 250 WO HCPCS: Performed by: INTERNAL MEDICINE

## 2021-08-24 PROCEDURE — 36415 COLL VENOUS BLD VENIPUNCTURE: CPT

## 2021-08-24 PROCEDURE — 6370000000 HC RX 637 (ALT 250 FOR IP): Performed by: STUDENT IN AN ORGANIZED HEALTH CARE EDUCATION/TRAINING PROGRAM

## 2021-08-24 RX ADMIN — ENOXAPARIN SODIUM 40 MG: 40 INJECTION SUBCUTANEOUS at 08:56

## 2021-08-24 RX ADMIN — ENOXAPARIN SODIUM 40 MG: 40 INJECTION SUBCUTANEOUS at 20:35

## 2021-08-24 RX ADMIN — FERROUS SULFATE TAB EC 325 MG (65 MG FE EQUIVALENT) 325 MG: 325 (65 FE) TABLET DELAYED RESPONSE at 07:55

## 2021-08-24 RX ADMIN — REMDESIVIR 100 MG: 100 INJECTION, POWDER, LYOPHILIZED, FOR SOLUTION INTRAVENOUS at 23:05

## 2021-08-24 RX ADMIN — ALBUTEROL SULFATE 2 PUFF: 90 AEROSOL, METERED RESPIRATORY (INHALATION) at 12:04

## 2021-08-24 RX ADMIN — DEXAMETHASONE 6 MG: 4 TABLET ORAL at 07:55

## 2021-08-24 RX ADMIN — REMDESIVIR 200 MG: 100 INJECTION, POWDER, LYOPHILIZED, FOR SOLUTION INTRAVENOUS at 00:11

## 2021-08-24 RX ADMIN — ACETAMINOPHEN 650 MG: 325 TABLET ORAL at 05:16

## 2021-08-24 RX ADMIN — SODIUM CHLORIDE, PRESERVATIVE FREE 10 ML: 5 INJECTION INTRAVENOUS at 20:34

## 2021-08-24 RX ADMIN — SODIUM CHLORIDE, PRESERVATIVE FREE 10 ML: 5 INJECTION INTRAVENOUS at 12:02

## 2021-08-24 RX ADMIN — ALBUTEROL SULFATE 2 PUFF: 90 AEROSOL, METERED RESPIRATORY (INHALATION) at 04:14

## 2021-08-24 RX ADMIN — BENZONATATE 100 MG: 100 CAPSULE ORAL at 18:01

## 2021-08-24 ASSESSMENT — PAIN SCALES - GENERAL
PAINLEVEL_OUTOF10: 0
PAINLEVEL_OUTOF10: 3

## 2021-08-24 NOTE — PROGRESS NOTES
[facility]ass[facility]    Progress Note    8/24/2021    3:06 PM    Name:   Roman Houston  MRN:     [id number]     Manuelide:      [de-identified]   Room:   [address] [place] [address] Day:  2  Admit Date:  8/22/2021  8:31 PM    PCP:   No primary care provider on file. Code Status:  Full Code    Subjective:     C/C: Shortness of breath     Interval History Status: worsened. Patient was seen and examined at bedside this afternoon. Continues to complain of shortness of breath and fever. Denies nausea/vomiting or chest pain. Currently on day 3 of Decadron and day 2 of remdesivir. Infectious disease following. Brief History:     (per HPI) Roman Houston is a 27 y.o. Non- / non  female who presents with shortness of breath and decreasing saturations.      Patient with past medical history of morbid obesity, depression, menorrhagia returns to the ER for shortness of breath and low oxygen saturation episode at home. Patient was in the ER on Saturday for fatigue, fever, dyspnea for 4 days, was diagnosed with Covid with pulse ox as patient was saturating well on room air. Patient states that at home her pulse ox showed saturation of 88% which prompted her to come to ER. Patient also reports constant shortness of breath, more with exertion. She felt febrile.     In the ER patient was noted to be febrile, temperature of 100.9, tachycardic with a temperature of 109, blood pressure 135/96. BMP normal.  Lactic acid 1.3. Troponin less than 6. Vitamin D 7. Review of Systems:     Constitutional:  Positive for fever and chills. Respiratory: Positive for shortness of breath. Cardiovascular:  negative for chest pain, chest pressure/discomfort, lower extremity edema, palpitations  Gastrointestinal:  negative for abdominal pain, constipation, diarrhea, nausea, vomiting  Neurological:  negative for dizziness, headache    Medications:      Allergies:  No Known Allergies    Current Meds:   Scheduled Meds:    sodium chloride flush  5-40 mL IntraVENous 2 times per day    dexamethasone  6 mg Oral Daily    vitamin D  50,000 Units Oral Weekly    enoxaparin  40 mg Subcutaneous BID    ferrous sulfate  325 mg Oral Daily with breakfast    remdesivir IVPB  100 mg IntraVENous Q24H     Continuous Infusions:    sodium chloride       PRN Meds: sodium chloride flush, sodium chloride, ondansetron **OR** ondansetron, magnesium hydroxide, acetaminophen **OR** acetaminophen, dextromethorphan-guaiFENesin, sodium chloride, albuterol sulfate HFA, benzonatate    Data:     Past Medical History:   has a past medical history of Depression. Social History:   reports that she has been smoking cigars. She started smoking about 18 years ago. She has never used smokeless tobacco. She reports current alcohol use. She reports previous drug use. Drug: Marijuana. Family History:   Family History   Problem Relation Age of Onset    Diabetes Mother        Vitals:  BP (!) 149/94   Pulse 90   Temp 99.5 °F (37.5 °C) (Oral)   Resp 24   Ht 5' 6\" (1.676 m)   Wt (!) 437 lb 2.8 oz (198.3 kg)   SpO2 95%   BMI 70.56 kg/m²   Temp (24hrs), Av.7 °F (37.1 °C), Min:98.2 °F (36.8 °C), Max:99.5 °F (37.5 °C)    No results for input(s): POCGLU in the last 72 hours. I/O (24Hr):     Intake/Output Summary (Last 24 hours) at 2021 1506  Last data filed at 2021 0011  Gross per 24 hour   Intake 120 ml   Output    Net 120 ml       Labs:  Hematology:  Recent Labs     21  2133 21  1021 21  0339   WBC 5.8 4.2  --    RBC 3.49* 4.21  --    HGB 8.7* 10.4*  --    HCT 28.8* 34.8*  --    MCV 82.5* 82.7  --    MCH 24.9* 24.7*  --    MCHC 30.2 29.9  --    RDW 15.9* 15.6*  --     200  --    MPV 11.6 11.1  --    CRP  --  26.1* 17.7*   INR  --  0.9  --    DDIMER  --  1.23 1.00     Chemistry:  Recent Labs     21  2133 21  2233 21  1021     --  142   K 3.8  --  4.4   --  107   CO2 24  --  22   GLUCOSE 105*  --  130*   BUN 5*  --  6   CREATININE 0.88  --  0.73   ANIONGAP 11  --  13   LABGLOM >60  --  >60   GFRAA >60  --  >60   CALCIUM 8.2*  --  8.6   TROPHS  --   --  <6   LACTACIDWB  --  0.8 1.3     Recent Labs     08/22/21  2133 08/23/21  1021 08/24/21  0339   PROT 7.1 7.4 7.2   LABALBU 3.6 3.4* 3.4*   AST 41* 45* 66*   ALT 29 31 49*   LDH  --  318*  --    ALKPHOS 52 51 52   BILITOT <0.10* <0.10* <0.10*   BILIDIR  --   --  <0.08     ABG:No results found for: POCPH, PHART, PH, POCPCO2, QRV6DUM, PCO2, POCPO2, PO2ART, PO2, POCHCO3, FGT7PGL, HCO3, NBEA, PBEA, BEART, BE, THGBART, THB, HSA3VKV, OCFP7YXM, U8LFTCZH, O2SAT, FIO2  Lab Results   Component Value Date/Time    SPECIAL NOT REPORTED 08/23/2021 11:23 AM    SPECIAL NOT REPORTED 08/23/2021 11:23 AM     Lab Results   Component Value Date/Time    CULTURE CANCELLED DUPLICATE ORDER 77/10/5886 11:23 AM       Radiology:  XR CHEST PORTABLE    Result Date: 8/22/2021  Mild interstitial infiltrates unchanged     XR CHEST PORTABLE    Result Date: 8/21/2021  Patchy bilateral airspace disease suggestive of multifocal inflammatory process or infection.        Physical Examination:        General appearance:  alert, cooperative and no distress  Mental Status:  oriented to person, place and time and normal affect  Lungs:  Decreased breath sounds bilaterally   Heart:  regular rate and rhythm, no murmur  Abdomen:  soft, nontender, nondistended, normal bowel sounds, no masses, hepatomegaly, splenomegaly  Extremities:  no edema, redness, tenderness in the calves  Skin:  no gross lesions, rashes, induration    Assessment:        Hospital Problems         Last Modified POA    Morbid obesity ([address] 8/23/2021 Yes    Pneumonia due to COVID-19 virus 8/22/2021 Yes    Iron deficiency anemia due to chronic blood loss 8/23/2021 Yes    Sepsis with acute hypoxic respiratory failure without septic shock ([facility] [address] 8/23/2021 Yes    Vitamin D deficiency 8/23/2021 Yes          Plan:        Acute hypoxic respiratory failure 2/2 COVID-19   -Continue dexamethasone (day 3)   -Continue remdesivir (day 2)   -Supplemental O2 as needed   -Infectious disease following   -Continue Lovenox 40 mg bid   -Continue supplemental vitamin D   -CRP trending down     Morbid obesity 2/2 excess calories   - on importance of exercise and dietary modifications       John Vogel MD  8/24/2021  3:06 PM

## 2021-08-24 NOTE — CARE COORDINATION
Case Management Initial Discharge Plan  Annette Hernandez,             Met with:patient via telephone to discuss discharge plans. Information verified: address, contacts, phone number, , insurance Yes  Insurance Provider: [address] [place] [address]    Emergency Contact/Next of Kin name & number: Glenis Zuniga (mother) [phone]  Who are involved in patient's support system? family    PCP: pt unsure of name but states somewhere in [place]  Date of last visit: 1.5 months ago      Discharge Planning    Living Arrangements:  [address] 1 stories  0 stairs to climb to get into front door, stairs to climb to reach second floor  Location of bedroom/bathroom in home     Patient able to perform ADL's:Independent    Current Services (outpatient & in home)   DME equipment:   DME provider:     Is patient receiving oral anticoagulation therapy? No    If indicated:   Physician managing anticoagulation treatment:   Where does patient obtain lab work for ATC treatment? Potential Assistance Needed:       Patient agreeable to home care: No  [place] of choice provided:  n/a    Prior SNF/Rehab Placement and Facility:   Agreeable to SNF/Rehab: No  [place] of choice provided: n/a     Evaluation: no    Expected Discharge date:       Patient expects to be discharged to: If home: is the family and/or caregiver wiling & able to provide support at home? yes  Who will be providing this support? family    Follow Up Appointment: Best Day/ Time:      Transportation provider:   Transportation arrangements needed for discharge: No    Readmission Risk              Risk of Unplanned Readmission:  12             Does patient have a readmission risk score greater than 14?: No  If yes, follow-up appointment must be made within 7 days of discharge. Goals of Care: breathe easier      Educated patient on transitional options, provided freedom of choice and are agreeable with plan      Discharge Plan: home independent.  Monitor for home O2 needs          Electronically signed by Jose Gonzalez RN on 8/24/21 at 10:20 AM EDT

## 2021-08-24 NOTE — PROGRESS NOTES
Physical Therapy  Facility/Department: [facility] CAR 3  Daily Treatment Note  NAME: Sandra Caldera  : 1990  MRN: [id number]    Date of Service: 2021    Discharge Recommendations:    Further therapy recommended at discharge. PT Equipment Recommendations  Equipment Needed: No    Assessment   Body structures, Functions, Activity limitations: Decreased functional mobility ; Decreased strength;Decreased endurance;Decreased balance  Assessment: Pt performed and was educated on LE exercises. Pt declined to get up and OOB today d/t just returning to bed from the bathroom. Would recommend continued PT to address further functional mobility deficits and return to prior level of independence. Prognosis: Good  PT Education: PT Role;Plan of Care;General Safety  REQUIRES PT FOLLOW UP: Yes  Activity Tolerance  Activity Tolerance: Patient limited by fatigue;Patient limited by endurance     Patient Diagnosis(es): The encounter diagnosis was COVID-19.     has a past medical history of Depression. has a past surgical history that includes [place] tooth extraction (Left, ). Restrictions  Restrictions/Precautions  Restrictions/Precautions: General Precautions, Fall Risk, Isolation, Up as Tolerated  Required Braces or Orthoses?: No  Position Activity Restriction  Other position/activity restrictions: covid+/droplet+, high BMI  Subjective   General  Chart Reviewed: Yes  Response To Previous Treatment: Patient with no complaints from previous session. Family / Caregiver Present: No  Subjective  Subjective: Pt just returned to bed from ambulating to bathroom up writer's arrival. Pt not wanting to get back up d/t SOB/fatigue following, but agreeable to a few exercises and education. General Comment  Comments: Pt remained in bed throughout session with call light in hand. Pt on 3L O2, but removed to go to bathroom.   Pain Screening  Patient Currently in Pain: Denies  Vital Signs  Patient Currently in Pain: Denies Orientation  Orientation  Overall Orientation Status: Within Functional Limits  Cognition   Cognition  Overall Cognitive Status: WFL  Objective   Bed mobility  Supine to Sit: Supervision  Sit to Supine: Supervision  Scooting: Supervision  Comment: Pt gets up and ambulates to bathroom with no assistance per RN. Upon my arrival, pt was returning back to bed after getting up to bathroom. Ambulation  Ambulation?: No (Pt did no ambulate during this tx session, but writer witnessed pt getting back into bed upon arrival.)  Stairs/Curb  Stairs?: No    Exercises  Heelslides: x5  Hip Abduction: x5  Knee Short Arc Quad: x5  Ankle Pumps: x5  Comments: Pt performed and was educated on supine LE exericses to perform throughout the day to maintain strength. Also educated on seated LE exercises when she is up in a chair.         AM-PAC Score  AM-PAC Inpatient Mobility Raw Score : 23 (08/24/21 1518)  AM-PAC Inpatient T-Scale Score : 56.93 (08/24/21 1518)  Mobility Inpatient CMS 0-100% Score: 11.2 (08/24/21 1518)  Mobility Inpatient CMS G-Code Modifier : CI (08/24/21 1518)          Goals  Short term goals  Time Frame for Short term goals: 14 visits  Short term goal 1: Pt will be Aye bed mobility  Short term goal 2: Pt will be Aye transfers  Short term goal 3: Pt will be Aye amb 240'  Short term goal 4: Pt will navigate 2 steps Aye    Plan    Plan  Times per week: 3-5x/wk  Current Treatment Recommendations: Strengthening, Balance Training, Endurance Training, Functional Mobility Training, Transfer Training, Gait Training, Stair training, Home Exercise Program, Safety Education & Training, Patient/Caregiver Education & Training, Equipment Evaluation, Education, & procurement  Safety Devices  Type of devices: Call light within reach, Nurse notified, Gait belt, Patient at risk for falls, All fall risk precautions in place, Left in bed  Restraints  Initially in place: No     Therapy Time   Individual Concurrent Group Co-treatment Time In 1420         Time Out 1428         Minutes 8         Timed Code Treatment Minutes: 8 Minutes       Chanda Oakland, PTA

## 2021-08-25 LAB
ALBUMIN SERPL-MCNC: 3.3 G/DL (ref 3.5–5.2)
ALBUMIN/GLOBULIN RATIO: 0.9 (ref 1–2.5)
ALP BLD-CCNC: 50 U/L (ref 35–104)
ALT SERPL-CCNC: 68 U/L (ref 5–33)
AST SERPL-CCNC: 71 U/L
BILIRUB SERPL-MCNC: <0.1 MG/DL (ref 0.3–1.2)
BILIRUBIN DIRECT: <0.08 MG/DL
BILIRUBIN, INDIRECT: ABNORMAL MG/DL (ref 0–1)
C-REACTIVE PROTEIN: 13 MG/L (ref 0–5)
D-DIMER QUANTITATIVE: 0.83 MG/L FEU
GLOBULIN: ABNORMAL G/DL (ref 1.5–3.8)
PROCALCITONIN: 0.08 NG/ML
TOTAL PROTEIN: 7 G/DL (ref 6.4–8.3)

## 2021-08-25 PROCEDURE — 6370000000 HC RX 637 (ALT 250 FOR IP): Performed by: STUDENT IN AN ORGANIZED HEALTH CARE EDUCATION/TRAINING PROGRAM

## 2021-08-25 PROCEDURE — 86140 C-REACTIVE PROTEIN: CPT

## 2021-08-25 PROCEDURE — 94660 CPAP INITIATION&MGMT: CPT

## 2021-08-25 PROCEDURE — 6370000000 HC RX 637 (ALT 250 FOR IP): Performed by: EMERGENCY MEDICINE

## 2021-08-25 PROCEDURE — 2500000003 HC RX 250 WO HCPCS: Performed by: INTERNAL MEDICINE

## 2021-08-25 PROCEDURE — 94760 N-INVAS EAR/PLS OXIMETRY 1: CPT

## 2021-08-25 PROCEDURE — 1200000000 HC SEMI PRIVATE

## 2021-08-25 PROCEDURE — 85379 FIBRIN DEGRADATION QUANT: CPT

## 2021-08-25 PROCEDURE — 36415 COLL VENOUS BLD VENIPUNCTURE: CPT

## 2021-08-25 PROCEDURE — 99232 SBSQ HOSP IP/OBS MODERATE 35: CPT | Performed by: STUDENT IN AN ORGANIZED HEALTH CARE EDUCATION/TRAINING PROGRAM

## 2021-08-25 PROCEDURE — 2580000003 HC RX 258: Performed by: INTERNAL MEDICINE

## 2021-08-25 PROCEDURE — 80076 HEPATIC FUNCTION PANEL: CPT

## 2021-08-25 PROCEDURE — 2580000003 HC RX 258: Performed by: NURSE PRACTITIONER

## 2021-08-25 PROCEDURE — 2700000000 HC OXYGEN THERAPY PER DAY

## 2021-08-25 PROCEDURE — 6360000002 HC RX W HCPCS: Performed by: STUDENT IN AN ORGANIZED HEALTH CARE EDUCATION/TRAINING PROGRAM

## 2021-08-25 PROCEDURE — 99232 SBSQ HOSP IP/OBS MODERATE 35: CPT | Performed by: INTERNAL MEDICINE

## 2021-08-25 PROCEDURE — 84145 PROCALCITONIN (PCT): CPT

## 2021-08-25 PROCEDURE — 6370000000 HC RX 637 (ALT 250 FOR IP): Performed by: NURSE PRACTITIONER

## 2021-08-25 RX ORDER — IBUPROFEN 600 MG/1
600 TABLET ORAL ONCE
Status: COMPLETED | OUTPATIENT
Start: 2021-08-25 | End: 2021-08-25

## 2021-08-25 RX ORDER — IBUPROFEN 600 MG/1
600 TABLET ORAL EVERY 6 HOURS PRN
Status: DISCONTINUED | OUTPATIENT
Start: 2021-08-25 | End: 2021-08-28 | Stop reason: HOSPADM

## 2021-08-25 RX ADMIN — GUAIFENESIN DEXTROMETHORPHAN HYDROBROMIDE ORAL SOLUTION 5 ML: 200; 20 SOLUTION ORAL at 14:08

## 2021-08-25 RX ADMIN — BENZONATATE 100 MG: 100 CAPSULE ORAL at 16:47

## 2021-08-25 RX ADMIN — IBUPROFEN 600 MG: 600 TABLET, FILM COATED ORAL at 08:09

## 2021-08-25 RX ADMIN — FERROUS SULFATE TAB EC 325 MG (65 MG FE EQUIVALENT) 325 MG: 325 (65 FE) TABLET DELAYED RESPONSE at 08:09

## 2021-08-25 RX ADMIN — ENOXAPARIN SODIUM 40 MG: 40 INJECTION SUBCUTANEOUS at 08:09

## 2021-08-25 RX ADMIN — GUAIFENESIN DEXTROMETHORPHAN HYDROBROMIDE ORAL SOLUTION 5 ML: 200; 20 SOLUTION ORAL at 08:11

## 2021-08-25 RX ADMIN — SODIUM CHLORIDE, PRESERVATIVE FREE 10 ML: 5 INJECTION INTRAVENOUS at 20:44

## 2021-08-25 RX ADMIN — REMDESIVIR 100 MG: 100 INJECTION, POWDER, LYOPHILIZED, FOR SOLUTION INTRAVENOUS at 23:30

## 2021-08-25 RX ADMIN — GUAIFENESIN DEXTROMETHORPHAN HYDROBROMIDE ORAL SOLUTION 5 ML: 200; 20 SOLUTION ORAL at 19:10

## 2021-08-25 RX ADMIN — ENOXAPARIN SODIUM 40 MG: 40 INJECTION SUBCUTANEOUS at 20:44

## 2021-08-25 RX ADMIN — DEXAMETHASONE 6 MG: 4 TABLET ORAL at 08:09

## 2021-08-25 RX ADMIN — BENZONATATE 100 MG: 100 CAPSULE ORAL at 08:13

## 2021-08-25 RX ADMIN — SODIUM CHLORIDE, PRESERVATIVE FREE 10 ML: 5 INJECTION INTRAVENOUS at 08:13

## 2021-08-25 RX ADMIN — IBUPROFEN 600 MG: 600 TABLET, FILM COATED ORAL at 19:00

## 2021-08-25 ASSESSMENT — ENCOUNTER SYMPTOMS
EYE PAIN: 0
COLOR CHANGE: 0
SHORTNESS OF BREATH: 1
COUGH: 0
APNEA: 0
ABDOMINAL DISTENTION: 0

## 2021-08-25 ASSESSMENT — PAIN SCALES - GENERAL
PAINLEVEL_OUTOF10: 0
PAINLEVEL_OUTOF10: 8
PAINLEVEL_OUTOF10: 7

## 2021-08-25 NOTE — PROGRESS NOTES
[facility] -   Infectious diseases evaluation  admission date 8/22/2021    reason for consultation:   covid    Impression :   Current:  · covid pneumonia and fever desaturation  · Elevated CRP    Other:  · Smoker and obese  Discussion / summary of stay / plan of care   ·   Recommendations   · Due to rapid worsening and desaturation 88 in ER, start remdesevir 5 days till 8/27  · FU LFT  · lovenox, recently shown improved outcome when started early covid  · On decadron, by medicine -   · Inflammatory numbers seem to be improving and she is improving clinically. Continue same plan and follow CRP ferritin      Infection Control Recommendations   · Seymour Precautions  · Contact Isolation   · Airborne isolation  · Droplet Isolation      Antimicrobial Stewardship Recommendations   · Simplification of therapy  · Targeted therapy      Coordination ofOutpatient Care:   · Estimated Length of IV antimicrobials:  · Patient will need Midline / picc Catheter Insertion:   · Patient will need SNF:  · Patient will need outpatient wound care:     History of Present Illness:   Initial history:  Gerry Best is a 27y.o.-year-old female comes for SOB cough x 7 days PTA and desaturation at home, 88 So2, fever and tachycardia and tachypnea,  bilat infil on CXr and CRP slightly high  On RA since admission though  But per ER, progressed and got worse within 1 days in ER        Interval changes  8/25/2021   Patient Vitals for the past 8 hrs:   BP Temp src Pulse Resp   08/24/21 2310 (!) 157/100 Oral 82 25     Feels and looks better, her breathing is improved, she is not short of breath, oxygenating very well on 2 L of oxygen, CRP is 16 and ferritin is normal  Summary of relevant labs:  Labs:  Procalcitonin0. 09High   WBC4.2   D-Dimer, PHLMP4.419  UN730Soeg   CRP26.1High  - 17  Ferritin 111    Micro:  covid + 8/21/21  Sputum culture mixed bacteria imaging:  CXR   Mild interstitial Mental Status: She is alert and oriented to person, place, and time. Psychiatric:         Mood and Affect: Mood normal.         Thought Content: Thought content normal.         Past Medical History:     Past Medical History:   Diagnosis Date    Depression 5/1/2017       Past Surgical  History:     Past Surgical History:   Procedure Laterality Date    WISDOM TOOTH EXTRACTION Left 2014       Medications:      sodium chloride flush  5-40 mL IntraVENous 2 times per day    dexamethasone  6 mg Oral Daily    vitamin D  50,000 Units Oral Weekly    enoxaparin  40 mg Subcutaneous BID    ferrous sulfate  325 mg Oral Daily with breakfast    remdesivir IVPB  100 mg IntraVENous Q24H       Social History:     Social History     Socioeconomic History    Marital status: Single     Spouse name: Not on file    Number of children: Not on file    Years of education: Not on file    Highest education level: Not on file   Occupational History    Not on file   Tobacco Use    Smoking status: Current Every Day Smoker     Types: Cigars     Start date: 2003    Smokeless tobacco: Never Used    Tobacco comment: 1-2 black and milds daily   Vaping Use    Vaping Use: Never used   Substance and Sexual Activity    Alcohol use: Yes     Comment: Occasionally     Drug use: Not Currently     Types: Marijuana     Comment: daily use    Sexual activity: Yes     Partners: Male     Birth control/protection: I.U.D. Other Topics Concern    Not on file   Social History Narrative    Not on file     Social Determinants of Health     Financial Resource Strain:     Difficulty of Paying Living Expenses:    Food Insecurity:     Worried About Running Out of Food in the Last Year:     [address] in the Last Year:    Transportation Needs:     Lack of Transportation (Medical):      Lack of Transportation (Non-Medical):    Physical Activity:     Days of Exercise per Week:     Minutes of Exercise per Session:    Stress:     Feeling of Stress :    Social Connections:     Frequency of Communication with Friends and Family:     Frequency of Social Gatherings with Friends and Family:     Attends Muslim Services:     Active Member of Clubs or Organizations:     Attends Club or Organization Meetings:     Marital Status:    Intimate Partner Violence:     Fear of Current or Ex-Partner:     Emotionally Abused:     Physically Abused:     Sexually Abused:        Family History:     Family History   Problem Relation Age of Onset    Diabetes Mother       Medical Decision Making:   I have independently reviewed/ordered the following labs:    CBC with Differential:   Recent Labs     08/22/21 2133 08/23/21  1021   WBC 5.8 4.2   HGB 8.7* 10.4*   HCT 28.8* 34.8*    200   LYMPHOPCT 18* 19*   MONOPCT 9 4     BMP:  Recent Labs     08/22/21 2133 08/23/21  1021    142   K 3.8 4.4    107   CO2 24 22   BUN 5* 6   CREATININE 0.88 0.73     Hepatic Function Panel:   Recent Labs     08/23/21  1021 08/24/21  0339   PROT 7.4 7.2   LABALBU 3.4* 3.4*   BILIDIR  --  <0.08   IBILI  --  CANNOT BE CALCULATED   BILITOT <0.10* <0.10*   ALKPHOS 51 52   ALT 31 49*   AST 45* 66*     No results for input(s): RPR in the last 72 hours. No results for input(s): HIV in the last 72 hours. No results for input(s): BC in the last 72 hours. Lab Results   Component Value Date    CREATININE 0.73 08/23/2021    GLUCOSE 130 08/23/2021       Detailed results: Thank you for allowing us to participate in the care of this patient. Please call with questions. This note is created with the assistance of a speech recognition program.  While intending to generate adocument that actually reflects the content of the visit, the document can still have some errors including those of syntax and sound a like substitutions which may escape proof reading. It such instances, actual meaningcan be extrapolated by contextual diversion.     Tatiana Alfaro MD  Office: [phone]  Perfect serve / office [phone]

## 2021-08-25 NOTE — PROGRESS NOTES
[facility]    Progress Note    8/25/2021    12:31 PM    Name:   Sanford Verma  MRN:     [id number]     [place]:      [de-identified]   Room:   [address] [place] [address] Day:  3  Admit Date:  8/22/2021  8:31 PM    PCP:   No primary care provider on file. Code Status:  Full Code    Subjective:     C/C: Shortness of breath     Interval History Status: worsened. Patient was seen and examined at bedside this afternoon. Continues to complain of shortness of breath and fever. Denies nausea/vomiting or chest pain. Currently on day 4 of Decadron and day 3 of remdesivir. Infectious disease following. Brief History:     (per HPI) Sanford Verma is a 27 y.o. Non- / non  female who presents with shortness of breath and decreasing saturations.      Patient with past medical history of morbid obesity, depression, menorrhagia returns to the ER for shortness of breath and low oxygen saturation episode at home. Patient was in the ER on Saturday for fatigue, fever, dyspnea for 4 days, was diagnosed with Covid with pulse ox as patient was saturating well on room air. Patient states that at home her pulse ox showed saturation of 88% which prompted her to come to ER. Patient also reports constant shortness of breath, more with exertion. She felt febrile.     In the ER patient was noted to be febrile, temperature of 100.9, tachycardic with a temperature of 109, blood pressure 135/96. BMP normal.  Lactic acid 1.3. Troponin less than 6. Vitamin D 7. Review of Systems:     Constitutional:  Positive for fever and chills. Respiratory: Positive for shortness of breath. Cardiovascular:  negative for chest pain, chest pressure/discomfort, lower extremity edema, palpitations  Gastrointestinal:  negative for abdominal pain, constipation, diarrhea, nausea, vomiting  Neurological:  negative for dizziness, headache    Medications:      Allergies:  No Known Allergies    Current Meds:   Scheduled Meds:    sodium chloride flush  5-40 mL IntraVENous 2 times per day    dexamethasone  6 mg Oral Daily    vitamin D  50,000 Units Oral Weekly    enoxaparin  40 mg Subcutaneous BID    ferrous sulfate  325 mg Oral Daily with breakfast    remdesivir IVPB  100 mg IntraVENous Q24H     Continuous Infusions:    sodium chloride       PRN Meds: sodium chloride flush, sodium chloride, ondansetron **OR** ondansetron, magnesium hydroxide, acetaminophen **OR** acetaminophen, dextromethorphan-guaiFENesin, sodium chloride, albuterol sulfate HFA, benzonatate    Data:     Past Medical History:   has a past medical history of Depression. Social History:   reports that she has been smoking cigars. She started smoking about 18 years ago. She has never used smokeless tobacco. She reports current alcohol use. She reports previous drug use. Drug: Marijuana. Family History:   Family History   Problem Relation Age of Onset    Diabetes Mother        Vitals:  BP (!) 135/103   Pulse 90   Temp 99.2 °F (37.3 °C) (Oral)   Resp 20   Ht 5' 6\" (1.676 m)   Wt (!) 437 lb 2.8 oz (198.3 kg)   SpO2 93%   BMI 70.56 kg/m²   Temp (24hrs), Av.9 °F (37.2 °C), Min:98.5 °F (36.9 °C), Max:99.2 °F (37.3 °C)    No results for input(s): POCGLU in the last 72 hours. I/O (24Hr):     Intake/Output Summary (Last 24 hours) at 2021 1231  Last data filed at 2021 1730  Gross per 24 hour   Intake 1400 ml   Output    Net 1400 ml       Labs:  Hematology:  Recent Labs     21  2133 21  1021 21  0339 21  0832   WBC 5.8 4.2  --   --    RBC 3.49* 4.21  --   --    HGB 8.7* 10.4*  --   --    HCT 28.8* 34.8*  --   --    MCV 82.5* 82.7  --   --    MCH 24.9* 24.7*  --   --    MCHC 30.2 29.9  --   --    RDW 15.9* 15.6*  --   --     200  --   --    MPV 11.6 11.1  --   --    CRP  --  26.1* 17.7*  --    INR  --  0.9  --   --    DDIMER  --  1.23 1.00 0.83     Chemistry:  Recent Labs 08/22/21  2133 08/22/21  2233 08/23/21  1021     --  142   K 3.8  --  4.4     --  107   CO2 24  --  22   GLUCOSE 105*  --  130*   BUN 5*  --  6   CREATININE 0.88  --  0.73   ANIONGAP 11  --  13   LABGLOM >60  --  >60   GFRAA >60  --  >60   CALCIUM 8.2*  --  8.6   TROPHS  --   --  <6   LACTACIDWB  --  0.8 1.3     Recent Labs     08/23/21  1021 08/24/21  0339 08/25/21  0832   PROT 7.4 7.2 7.0   LABALBU 3.4* 3.4* 3.3*   AST 45* 66* 71*   ALT 31 49* 68*   *  --   --    ALKPHOS 51 52 50   BILITOT <0.10* <0.10* <0.10*   BILIDIR  --  <0.08 <0.08     ABG:No results found for: POCPH, PHART, PH, POCPCO2, RJZ2CLJ, PCO2, POCPO2, PO2ART, PO2, POCHCO3, LPA2SPC, HCO3, NBEA, PBEA, BEART, BE, THGBART, THB, BLD4PYM, BLGG0ZCX, D7PMVBQN, O2SAT, FIO2  Lab Results   Component Value Date/Time    SPECIAL NOT REPORTED 08/23/2021 11:23 AM    SPECIAL NOT REPORTED 08/23/2021 11:23 AM     Lab Results   Component Value Date/Time    CULTURE CANCELLED DUPLICATE ORDER 62/66/0883 11:23 AM       Radiology:  XR CHEST PORTABLE    Result Date: 8/22/2021  Mild interstitial infiltrates unchanged     XR CHEST PORTABLE    Result Date: 8/21/2021  Patchy bilateral airspace disease suggestive of multifocal inflammatory process or infection.        Physical Examination:        General appearance:  alert, cooperative and no distress  Mental Status:  oriented to person, place and time and normal affect  Lungs:  Decreased breath sounds bilaterally   Heart:  regular rate and rhythm, no murmur  Abdomen:  soft, nontender, nondistended, normal bowel sounds, no masses, hepatomegaly, splenomegaly  Extremities:  no edema, redness, tenderness in the calves  Skin:  no gross lesions, rashes, induration    Assessment:        Hospital Problems         Last Modified POA    Morbid obesity ([address] 8/23/2021 Yes    Pneumonia due to COVID-19 virus 8/22/2021 Yes    Iron deficiency anemia due to chronic blood loss 8/23/2021 Yes    Sepsis with acute hypoxic respiratory failure without septic shock ([facility] 8/23/2021 Yes    Vitamin D deficiency 8/23/2021 Yes          Plan:        Acute hypoxic respiratory failure 2/2 COVID-19   -Continue dexamethasone (day 4)   -Continue remdesivir (day 3)   -Supplemental O2 as needed   -Infectious disease following   -Continue Lovenox 40 mg bid   -Continue supplemental vitamin D   -CRP trending down     Morbid obesity 2/2 excess calories   - on importance of exercise and dietary modifications       Mary Garcia MD  8/25/2021  12:31 PM

## 2021-08-25 NOTE — FLOWSHEET NOTE
[facility] [address]  PROGRESS NOTE    Shift date:8/25/2021  Shift day: Tuesday   Shift # 3    Room # 3026/3026-01   Name: Gerry Best            Age: 27 y.o. Gender: female          Anabaptism: [address] of [employer]:     Referral:   EMOTIONAL DISTRESS    Admit Date & Time: 8/22/2021  8:31 PM    PATIENT/EVENT DESCRIPTION:  Gerry Best is a 27 y.o. female in [address] [place] [address]. Patient has Covid.  asked by nurse to visit patient as patient was depressed, sad, and struggling with loneliness. SPIRITUAL ASSESSMENT/INTERVENTION:  Enio Minium was ministry of presence.  put on all required PPE and met patient in room 3026. Patient welcomed  and shared having difficulties coping with the isolation of being a Covid 19 patient. .  Patient expressed concern of being  from her 6and 15year old daughters, who are in the care of her mother. Patient shared life review and her employment at the [employer]. Patient shared her struggle with wanting to get better but wanting to get home to her children. Patient shared her loli struggles.  listened and validated patient's feeling and concerns.  provided space for patient to share her story.  prayed with patient. SPIRITUAL CARE FOLLOW-UP PLAN:  Chaplains will remain available to offer spiritual and emotional support as needed. Electronically signed by Shu Marking      08/25/21 1773   Encounter Summary   Services provided to: Patient   Referral/Consult From: Nurse   Support System Parent; Children   Continue Visiting   (8/25/2021)   Complexity of Encounter High   Length of Encounter 45 minutes   Spiritual Assessment Completed Yes   Crisis   Type Emotional distress   Assessment Approachable;Tearful;Loneliness; Hopeless   Intervention Active listening;Explored feelings, thoughts, concerns;Explored coping resources;Prayer;Scripture;Sustaining presence/ Ministry of presence   Outcome Expressed gratitude;Engaged in conversation; Shared life review; Less anxious, less agitated;New perspective;Venting emotion   Spiritual/Oriental orthodox   Type Spiritual struggle   Assessment Approachable   Intervention Active listening;Prayer   Outcome Expressed gratitude   , on 8/25/2021 at 6:53 AM.  [address]  [phone]

## 2021-08-25 NOTE — PROGRESS NOTES
[facility] -   Infectious diseases evaluation  admission date 8/22/2021    reason for consultation:   covid    Impression :   Current:  · covid pneumonia and fever desaturation  · Elevated CRP    Other:  · Smoker and obese  Discussion / summary of stay / plan of care   ·   Recommendations   · Due to rapid worsening and desaturation 88 in ER, start remdesevir 5 days till 8/27  · FU LFT  · lovenox, recently shown improved outcome when started early covid  · On decadron, by medicine -   · Inflammatory numbers seem to be low -but she remains easily hypoxic  · Follow CRP ferritin      Infection Control Recommendations   · [place] Precautions  · Contact Isolation   · Airborne isolation  · Droplet Isolation      Antimicrobial Stewardship Recommendations   · Simplification of therapy  · Targeted therapy      Coordination ofOutpatient Care:   · Estimated Length of IV antimicrobials:  · Patient will need Midline / picc Catheter Insertion:   · Patient will need SNF:  · Patient will need outpatient wound care:     History of Present Illness:   Initial history:  Sara Sharp is a 27y.o.-year-old female comes for SOB cough x 7 days PTA and desaturation at home, 88 So2, fever and tachycardia and tachypnea,  bilat infil on CXr and CRP slightly high  On RA since admission though  But per ER, progressed and got worse within 1 days in ER        Interval changes  8/25/2021   Patient Vitals for the past 8 hrs:   BP Temp Temp src Pulse Resp SpO2   08/25/21 1215 (!) 135/103   90 20 93 %   08/25/21 0810 (!) 148/109 99.2 °F (37.3 °C) Oral 88 18 96 %   08/25/21 0500 (!) 159/112 99 °F (37.2 °C) Oral 81 25 94 %     Feels better today generally she was saturating 93% when she was on 2 L and occasionally will go down to 89 when she does something  Remains short of breath with exertion and tachypneic  Eating much better,  CRP 13      Summary of relevant labs:  Labs:  Procalcitonin0. 09High WBC4.2   D-Dimer, WUFQI7.456  [id number]   CRP26.1High  - 17  - 13  Ferritin 111    Micro:  covid + 8/21/21  Sputum culture mixed bacteria imaging:  CXR   Mild interstitial infiltrates unchanged       I have personally reviewed the past medical history, past surgical history, medications, social history, and family history, and I haveupdated the database accordingly. Allergies:   Patient has no known allergies. Review of Systems:     Review of Systems   Constitutional: Negative for activity change, appetite change, fatigue and fever. HENT: Negative for congestion. Eyes: Negative for photophobia, pain and redness. Respiratory: Positive for shortness of breath. Negative for apnea and cough. Cardiovascular: Negative for chest pain. Gastrointestinal: Negative for abdominal distention. Endocrine: Negative for polydipsia. Genitourinary: Negative for dysuria. Musculoskeletal: Negative for arthralgias. Skin: Negative for color change. Allergic/Immunologic: Negative for immunocompromised state. Neurological: Negative for dizziness. Hematological: Negative for adenopathy. Psychiatric/Behavioral: Negative for agitation. Physical Examination :       Physical Exam  Constitutional:       Appearance: She is obese. She is not ill-appearing. HENT:      Head: Normocephalic and atraumatic. Nose: Nose normal.      Mouth/Throat:      Mouth: Mucous membranes are moist.   Eyes:      General: No scleral icterus. Conjunctiva/sclera: Conjunctivae normal.   Cardiovascular:      Rate and Rhythm: Normal rate and regular rhythm. Heart sounds: Normal heart sounds. No murmur heard. Pulmonary:      Effort: No respiratory distress. Breath sounds: Normal breath sounds. Abdominal:      General: There is no distension. Palpations: Abdomen is soft. There is no mass.    Genitourinary:     Comments: No kerr  Musculoskeletal:         General: No swelling, tenderness, deformity or signs of injury. Cervical back: Neck supple. No rigidity. Skin:     General: Skin is dry. Coloration: Skin is not jaundiced or pale. Findings: No erythema. Neurological:      General: No focal deficit present. Mental Status: She is alert and oriented to person, place, and time. Psychiatric:         Mood and Affect: Mood normal.         Thought Content: Thought content normal.         Past Medical History:     Past Medical History:   Diagnosis Date    Depression 5/1/2017       Past Surgical  History:     Past Surgical History:   Procedure Laterality Date    WISDOM TOOTH EXTRACTION Left 2014       Medications:      sodium chloride flush  5-40 mL IntraVENous 2 times per day    dexamethasone  6 mg Oral Daily    vitamin D  50,000 Units Oral Weekly    enoxaparin  40 mg Subcutaneous BID    ferrous sulfate  325 mg Oral Daily with breakfast    remdesivir IVPB  100 mg IntraVENous Q24H       Social History:     Social History     Socioeconomic History    Marital status: Single     Spouse name: Not on file    Number of children: Not on file    Years of education: Not on file    Highest education level: Not on file   Occupational History    Not on file   Tobacco Use    Smoking status: Current Every Day Smoker     Types: Cigars     Start date: 2003    Smokeless tobacco: Never Used    Tobacco comment: 1-2 black and milds daily   Vaping Use    Vaping Use: Never used   Substance and Sexual Activity    Alcohol use: Yes     Comment: Occasionally     Drug use: Not Currently     Types: Marijuana     Comment: daily use    Sexual activity: Yes     Partners: Male     Birth control/protection: I.U.D.    Other Topics Concern    Not on file   Social History Narrative    Not on file     Social Determinants of Health     Financial Resource Strain:     Difficulty of Paying Living Expenses:    Food Insecurity:     Worried About Running Out of Food in the Last Year:     [address] in the Last visit, the document can still have some errors including those of syntax and sound a like substitutions which may escape proof reading. It such instances, actual meaningcan be extrapolated by contextual diversion.     Regine Tipton MD  Office: [phone]  Perfect serve / office [phone]

## 2021-08-26 LAB
ALBUMIN SERPL-MCNC: 3.1 G/DL (ref 3.5–5.2)
ALBUMIN/GLOBULIN RATIO: 0.8 (ref 1–2.5)
ALP BLD-CCNC: 48 U/L (ref 35–104)
ALT SERPL-CCNC: 55 U/L (ref 5–33)
AST SERPL-CCNC: 48 U/L
BILIRUB SERPL-MCNC: <0.1 MG/DL (ref 0.3–1.2)
BILIRUBIN DIRECT: <0.08 MG/DL
BILIRUBIN, INDIRECT: ABNORMAL MG/DL (ref 0–1)
GLOBULIN: ABNORMAL G/DL (ref 1.5–3.8)
TOTAL PROTEIN: 6.8 G/DL (ref 6.4–8.3)

## 2021-08-26 PROCEDURE — 6370000000 HC RX 637 (ALT 250 FOR IP): Performed by: NURSE PRACTITIONER

## 2021-08-26 PROCEDURE — 6370000000 HC RX 637 (ALT 250 FOR IP): Performed by: EMERGENCY MEDICINE

## 2021-08-26 PROCEDURE — 6370000000 HC RX 637 (ALT 250 FOR IP): Performed by: STUDENT IN AN ORGANIZED HEALTH CARE EDUCATION/TRAINING PROGRAM

## 2021-08-26 PROCEDURE — 6360000002 HC RX W HCPCS: Performed by: STUDENT IN AN ORGANIZED HEALTH CARE EDUCATION/TRAINING PROGRAM

## 2021-08-26 PROCEDURE — 2580000003 HC RX 258: Performed by: INTERNAL MEDICINE

## 2021-08-26 PROCEDURE — 99232 SBSQ HOSP IP/OBS MODERATE 35: CPT | Performed by: STUDENT IN AN ORGANIZED HEALTH CARE EDUCATION/TRAINING PROGRAM

## 2021-08-26 PROCEDURE — 6370000000 HC RX 637 (ALT 250 FOR IP): Performed by: INTERNAL MEDICINE

## 2021-08-26 PROCEDURE — 1200000000 HC SEMI PRIVATE

## 2021-08-26 PROCEDURE — 2500000003 HC RX 250 WO HCPCS: Performed by: INTERNAL MEDICINE

## 2021-08-26 PROCEDURE — 80076 HEPATIC FUNCTION PANEL: CPT

## 2021-08-26 PROCEDURE — 36415 COLL VENOUS BLD VENIPUNCTURE: CPT

## 2021-08-26 PROCEDURE — 97116 GAIT TRAINING THERAPY: CPT

## 2021-08-26 PROCEDURE — 2580000003 HC RX 258: Performed by: NURSE PRACTITIONER

## 2021-08-26 PROCEDURE — 99232 SBSQ HOSP IP/OBS MODERATE 35: CPT | Performed by: INTERNAL MEDICINE

## 2021-08-26 PROCEDURE — 94660 CPAP INITIATION&MGMT: CPT

## 2021-08-26 PROCEDURE — 97110 THERAPEUTIC EXERCISES: CPT

## 2021-08-26 RX ORDER — AZITHROMYCIN 250 MG/1
500 TABLET, FILM COATED ORAL DAILY
Status: COMPLETED | OUTPATIENT
Start: 2021-08-26 | End: 2021-08-28

## 2021-08-26 RX ADMIN — FERROUS SULFATE TAB EC 325 MG (65 MG FE EQUIVALENT) 325 MG: 325 (65 FE) TABLET DELAYED RESPONSE at 08:54

## 2021-08-26 RX ADMIN — IBUPROFEN 600 MG: 600 TABLET, FILM COATED ORAL at 08:54

## 2021-08-26 RX ADMIN — AZITHROMYCIN 500 MG: 250 TABLET, FILM COATED ORAL at 14:48

## 2021-08-26 RX ADMIN — BENZONATATE 100 MG: 100 CAPSULE ORAL at 14:48

## 2021-08-26 RX ADMIN — IBUPROFEN 600 MG: 600 TABLET, FILM COATED ORAL at 01:06

## 2021-08-26 RX ADMIN — ACETAMINOPHEN 650 MG: 325 TABLET ORAL at 14:47

## 2021-08-26 RX ADMIN — ENOXAPARIN SODIUM 40 MG: 40 INJECTION SUBCUTANEOUS at 20:39

## 2021-08-26 RX ADMIN — SODIUM CHLORIDE, PRESERVATIVE FREE 10 ML: 5 INJECTION INTRAVENOUS at 20:43

## 2021-08-26 RX ADMIN — IBUPROFEN 600 MG: 600 TABLET, FILM COATED ORAL at 14:51

## 2021-08-26 RX ADMIN — GUAIFENESIN DEXTROMETHORPHAN HYDROBROMIDE ORAL SOLUTION 5 ML: 200; 20 SOLUTION ORAL at 20:41

## 2021-08-26 RX ADMIN — GUAIFENESIN DEXTROMETHORPHAN HYDROBROMIDE ORAL SOLUTION 5 ML: 200; 20 SOLUTION ORAL at 08:54

## 2021-08-26 RX ADMIN — IBUPROFEN 600 MG: 600 TABLET, FILM COATED ORAL at 20:39

## 2021-08-26 RX ADMIN — BENZONATATE 100 MG: 100 CAPSULE ORAL at 08:54

## 2021-08-26 RX ADMIN — ENOXAPARIN SODIUM 40 MG: 40 INJECTION SUBCUTANEOUS at 08:54

## 2021-08-26 RX ADMIN — DEXAMETHASONE 6 MG: 4 TABLET ORAL at 08:54

## 2021-08-26 RX ADMIN — REMDESIVIR 100 MG: 100 INJECTION, POWDER, LYOPHILIZED, FOR SOLUTION INTRAVENOUS at 23:09

## 2021-08-26 RX ADMIN — GUAIFENESIN DEXTROMETHORPHAN HYDROBROMIDE ORAL SOLUTION 5 ML: 200; 20 SOLUTION ORAL at 14:48

## 2021-08-26 RX ADMIN — SODIUM CHLORIDE, PRESERVATIVE FREE 5 ML: 5 INJECTION INTRAVENOUS at 09:00

## 2021-08-26 ASSESSMENT — PAIN SCALES - GENERAL
PAINLEVEL_OUTOF10: 0
PAINLEVEL_OUTOF10: 4
PAINLEVEL_OUTOF10: 3
PAINLEVEL_OUTOF10: 7
PAINLEVEL_OUTOF10: 8
PAINLEVEL_OUTOF10: 3
PAINLEVEL_OUTOF10: 6

## 2021-08-26 ASSESSMENT — PAIN DESCRIPTION - LOCATION: LOCATION: ABDOMEN;BACK

## 2021-08-26 ASSESSMENT — ENCOUNTER SYMPTOMS
STRIDOR: 0
EYE PAIN: 0
SHORTNESS OF BREATH: 1
ABDOMINAL DISTENTION: 0
WHEEZING: 0
EYE REDNESS: 0
COUGH: 0
APNEA: 0
COLOR CHANGE: 0
PHOTOPHOBIA: 0

## 2021-08-26 ASSESSMENT — PAIN DESCRIPTION - DESCRIPTORS: DESCRIPTORS: CRAMPING

## 2021-08-26 ASSESSMENT — PAIN DESCRIPTION - PAIN TYPE
TYPE: ACUTE PAIN
TYPE: ACUTE PAIN

## 2021-08-26 ASSESSMENT — PAIN DESCRIPTION - ONSET: ONSET: ON-GOING

## 2021-08-26 ASSESSMENT — PAIN DESCRIPTION - ORIENTATION: ORIENTATION: LOWER

## 2021-08-26 ASSESSMENT — PAIN DESCRIPTION - PROGRESSION: CLINICAL_PROGRESSION: NOT CHANGED

## 2021-08-26 ASSESSMENT — PAIN DESCRIPTION - FREQUENCY: FREQUENCY: CONTINUOUS

## 2021-08-26 NOTE — PROGRESS NOTES
Physical Therapy  Facility/Department: [facility] 3  Daily Treatment Note  NAME: Amador Toth  : 1990  MRN: [id number]    Date of Service: 2021    Discharge Recommendations:    Further therapy recommended at discharge. PT Equipment Recommendations  Equipment Needed: No    Assessment   Body structures, Functions, Activity limitations: Decreased functional mobility ; Decreased strength;Decreased endurance;Decreased balance  Assessment: Would recommend continued PT to address further functional mobility deficits and return to prior level of independence. Prognosis: Good  REQUIRES PT FOLLOW UP: Yes  Activity Tolerance  Activity Tolerance: Patient limited by fatigue;Patient limited by endurance     Patient Diagnosis(es): The encounter diagnosis was COVID-19.     has a past medical history of Depression. has a past surgical history that includes [place] tooth extraction (Left, ). Restrictions  Restrictions/Precautions  Restrictions/Precautions: General Precautions, Fall Risk, Isolation, Up as Tolerated  Required Braces or Orthoses?: No  Position Activity Restriction  Other position/activity restrictions: covid+/droplet+, high BMI  Subjective   General  Response To Previous Treatment: Patient with no complaints from previous session. Family / Caregiver Present: No  Subjective  Subjective: Pt resting in bed upon arrival, c/o SOB after just returning from the bathroom.  Agreeable to PT as tameka  Pain Screening  Patient Currently in Pain: Denies  Vital Signs  Patient Currently in Pain: Denies       Orientation  Orientation  Overall Orientation Status: Within Functional Limits  Cognition      Objective   Bed mobility  Rolling to Right: Modified independent  Supine to Sit: Modified independent  Sit to Supine: Modified independent  Scooting: Independent  Transfers  Sit to Stand: Stand by assistance  Stand to sit: Stand by assistance  Ambulation  Ambulation?: Yes  Ambulation 1  Surface: level tile  Device: No Device  Assistance: Contact guard assistance  Quality of Gait: demo extrememly slow tabitha, waddled gait, wide JACKELIN, no LOB  Distance: 25 ft  Stairs/Curb  Stairs?: No     Balance  Posture: Good  Sitting - Static: Good  Sitting - Dynamic: Good  Standing - Static: Good;-  Standing - Dynamic: Fair;+  Comments: wihout device   Exercise  Seated LE exercise program: Long Arc Quads, hip abduction/adduction, heel/toe raises, and marches.  Reps: 15x    AM-PAC Score  AM-PAC Inpatient Mobility Raw Score : 22 (08/26/21 1400)  AM-PAC Inpatient T-Scale Score : 53.28 (08/26/21 1400)  Mobility Inpatient CMS 0-100% Score: 20.91 (08/26/21 1400)  Mobility Inpatient CMS G-Code Modifier : CJ (08/26/21 1400)          Goals  Short term goals  Time Frame for Short term goals: 14 visits  Short term goal 1: Pt will be Aye bed mobility  Short term goal 2: Pt will be Aye transfers  Short term goal 3: Pt will be Aye amb 240'  Short term goal 4: Pt will navigate 2 steps Aye    Plan    Plan  Times per week: 3-5x/wk  Current Treatment Recommendations: Strengthening, Balance Training, Endurance Training, Functional Mobility Training, Transfer Training, Gait Training, Stair training, Home Exercise Program, Safety Education & Training, Patient/Caregiver Education & Training, Equipment Evaluation, Education, & procurement  Safety Devices  Type of devices: Call light within reach, Nurse notified, Gait belt, Patient at risk for falls, All fall risk precautions in place, Left in bed  Restraints  Initially in place: No     Therapy Time   Individual Concurrent Group Co-treatment   Time In 0845         Time Out 0913         Minutes 28         Timed Code Treatment Minutes: [address] [facility]

## 2021-08-26 NOTE — PLAN OF CARE
Problem: Airway Clearance - Ineffective  Goal: Achieve or maintain patent airway  Outcome: Ongoing     Problem: Gas Exchange - Impaired  Goal: Absence of hypoxia  Outcome: Ongoing  Goal: Promote optimal lung function  Outcome: Ongoing     Problem: Breathing Pattern - Ineffective  Goal: Ability to achieve and maintain a regular respiratory rate  Outcome: Ongoing     Problem:  Body Temperature -  Risk of, Imbalanced  Goal: Complications related to the disease process, condition or treatment will be avoided or minimized  Outcome: Ongoing     Problem: Isolation Precautions - Risk of Spread of Infection  Goal: Prevent transmission of infection  Outcome: Ongoing     Problem: Nutrition Deficits  Goal: Optimize nutritional status  Outcome: Ongoing     Problem: Risk for Fluid Volume Deficit  Goal: Maintain normal heart rhythm  Outcome: Ongoing  Goal: Maintain absence of muscle cramping  Outcome: Ongoing  Goal: Maintain normal serum potassium, sodium, calcium, phosphorus, and pH  Outcome: Ongoing     Problem: Loneliness or Risk for Loneliness  Goal: Demonstrate positive use of time alone when socialization is not possible  Outcome: Ongoing     Problem: Fatigue  Goal: Verbalize increase energy and improved vitality  Outcome: Ongoing     Problem: Patient Education: Go to Patient Education Activity  Goal: Patient/Family Education  Outcome: Ongoing     Problem: Falls - Risk of:  Goal: Will remain free from falls  Description: Will remain free from falls  Outcome: Ongoing  Goal: Absence of physical injury  Description: Absence of physical injury  Outcome: Ongoing

## 2021-08-26 NOTE — FLOWSHEET NOTE
following up per patient request from visit yesterday. Patient just went to sleep per nurse. 08/26/21 0136   Encounter Summary   Services provided to: Patient   Referral/Consult From: Patient   Support System Parent; Children   Continue Visiting   (8/26/2021)   Complexity of Encounter Low   Length of Encounter 15 minutes   Routine   Type Follow up

## 2021-08-26 NOTE — PROGRESS NOTES
[facility] -   Infectious diseases evaluation  admission date 8/22/2021    reason for consultation:   covid    Impression :   Current:  · covid pneumonia and fever desaturation  · Elevated CRP    Other:  · Smoker and obese  Discussion / summary of stay / plan of care   ·   Recommendations   · Due to rapid worsening and desaturation 88 in ER, start remdesevir 5 days till 8/27  · FU LFT  · lovenox, recently shown improved outcome when started early covid  · On decadron, by medicine -   · Inflammatory numbers seem to be improving and she is improving clinically.     · 8/26 bacterial bronchitis with brown phlegm, started azithromycin for bronchitis X 3 days  · Okay to make remdesivir in the morning of 8/27 to facilitate early discharge      Infection Control Recommendations   · [place] Precautions  · Contact Isolation   · Airborne isolation  · Droplet Isolation      Antimicrobial Stewardship Recommendations   · Simplification of therapy  · Targeted therapy      Coordination ofOutpatient Care:   · Estimated Length of IV antimicrobials:  · Patient will need Midline / picc Catheter Insertion:   · Patient will need SNF:  · Patient will need outpatient wound care:     History of Present Illness:   Initial history:  Flako Porter is a 27y.o.-year-old female comes for SOB cough x 7 days PTA and desaturation at home, 88 So2, fever and tachycardia and tachypnea,  bilat infil on CXr and CRP slightly high  On RA since admission though  But per ER, progressed and got worse within 1 days in ER        Interval changes  8/26/2021   Patient Vitals for the past 8 hrs:   BP Temp Temp src Pulse Resp SpO2   08/26/21 1500 136/84 98.2 °F (36.8 °C) Oral 85 20 92 %   08/26/21 1150 136/73 98 °F (36.7 °C) Oral 82 20 97 %     Feels and looks better, her breathing is improved, she is not short of breath, oxygenating very well on 2 L of oxygen, CRP is 16 and ferritin is normal      8/26  Patient seen at bedside. Tolerating RA. Afebrile. Says she is SOB when getting out  Of bed. Still is coughing some brownish-yellow phlegm concerning for bacterial infection due to history of smoking    CRP 39 liver enzymes normal  We will add a Z-Michael for the bacterial bronchitis    Summary of relevant labs:  Labs:  Procalcitonin0. 09High-0.08   WBC4.2   D-Dimer, PIQLI5.570  [id number]   CRP26.1High  - 17  Ferritin 111    Micro:  covid + 8/21/21  Sputum culture mixed bacteria imaging:  CXR   Mild interstitial infiltrates unchanged       I have personally reviewed the past medical history, past surgical history, medications, social history, and family history, and I haveupdated the database accordingly. Allergies:   Patient has no known allergies. Review of Systems:     Review of Systems   Constitutional: Negative for activity change, appetite change, fatigue and fever. HENT: Negative for congestion. Eyes: Negative for photophobia, pain and visual disturbance. Respiratory: Positive for shortness of breath. Negative for apnea, cough, wheezing and stridor. Cardiovascular: Negative for chest pain, palpitations and leg swelling. Gastrointestinal: Negative for abdominal distention. Endocrine: Negative for polydipsia. Genitourinary: Negative for dysuria. Musculoskeletal: Negative for arthralgias. Skin: Negative for color change. Allergic/Immunologic: Negative for immunocompromised state. Neurological: Negative for dizziness. Hematological: Negative for adenopathy. Psychiatric/Behavioral: Negative for agitation. Physical Examination :       Physical Exam  Constitutional:       Appearance: She is obese. She is not ill-appearing or diaphoretic. HENT:      Head: Normocephalic and atraumatic. Nose: Nose normal.      Mouth/Throat:      Mouth: Mucous membranes are moist.   Eyes:      General: No scleral icterus.      Conjunctiva/sclera: Conjunctivae normal.   Cardiovascular:      Rate and Rhythm: Normal rate and regular rhythm. Heart sounds: Normal heart sounds. No murmur heard. No friction rub. No gallop. Pulmonary:      Effort: No respiratory distress. Breath sounds: Normal breath sounds. No stridor. No wheezing, rhonchi or rales. Abdominal:      General: There is no distension. Palpations: Abdomen is soft. There is no mass. Tenderness: There is no abdominal tenderness. There is no guarding or rebound. Hernia: No hernia is present. Genitourinary:     Comments: No kerr  Musculoskeletal:         General: No swelling, tenderness, deformity or signs of injury. Cervical back: Neck supple. No rigidity or tenderness. Skin:     General: Skin is dry. Coloration: Skin is not jaundiced. Neurological:      General: No focal deficit present. Mental Status: She is alert and oriented to person, place, and time. Psychiatric:         Mood and Affect: Mood normal.         Thought Content:  Thought content normal.         Past Medical History:     Past Medical History:   Diagnosis Date    Depression 5/1/2017       Past Surgical  History:     Past Surgical History:   Procedure Laterality Date    WISDOM TOOTH EXTRACTION Left 2014       Medications:      azithromycin  500 mg Oral Daily    sodium chloride flush  5-40 mL IntraVENous 2 times per day    dexamethasone  6 mg Oral Daily    vitamin D  50,000 Units Oral Weekly    enoxaparin  40 mg Subcutaneous BID    ferrous sulfate  325 mg Oral Daily with breakfast    remdesivir IVPB  100 mg IntraVENous Q24H       Social History:     Social History     Socioeconomic History    Marital status: Single     Spouse name: Not on file    Number of children: Not on file    Years of education: Not on file    Highest education level: Not on file   Occupational History    Not on file   Tobacco Use    Smoking status: Current Every Day Smoker     Types: Cigars     Start date: 2003    Smokeless tobacco: Never Used    Tobacco comment: 1-2 black and milds daily   Vaping Use    Vaping Use: Never used   Substance and Sexual Activity    Alcohol use: Yes     Comment: Occasionally     Drug use: Not Currently     Types: Marijuana     Comment: daily use    Sexual activity: Yes     Partners: Male     Birth control/protection: I.U.D. Other Topics Concern    Not on file   Social History Narrative    Not on file     Social Determinants of Health     Financial Resource Strain:     Difficulty of Paying Living Expenses:    Food Insecurity:     Worried About Running Out of Food in the Last Year:     [address] in the Last Year:    Transportation Needs:     Lack of Transportation (Medical):  Lack of Transportation (Non-Medical):    Physical Activity:     Days of Exercise per Week:     Minutes of Exercise per Session:    Stress:     Feeling of Stress :    Social Connections:     Frequency of Communication with Friends and Family:     Frequency of Social Gatherings with Friends and Family:     Attends [employer] Services:     Active Member of Clubs or Organizations:     Attends Club or Organization Meetings:     Marital Status:    Intimate Partner Violence:     Fear of Current or Ex-Partner:     Emotionally Abused:     Physically Abused:     Sexually Abused:        Family History:     Family History   Problem Relation Age of Onset    Diabetes Mother       Medical Decision Making:   I have independently reviewed/ordered the following labs:    CBC with Differential:   No results for input(s): WBC, HGB, HCT, PLT, SEGSPCT, BANDSPCT, LYMPHOPCT, MONOPCT, EOSPCT in the last 72 hours. BMP:  No results for input(s): NA, K, CL, CO2, BUN, CREATININE, MG in the last 72 hours.     Invalid input(s): CA  Hepatic Function Panel:   Recent Labs     08/25/21  0832 08/26/21  0629   PROT 7.0 6.8   LABALBU 3.3* 3.1*   BILIDIR <0.08 <0.08   IBILI CANNOT BE CALCULATED CANNOT BE CALCULATED   BILITOT <0.10* <0.10*   ALKPHOS 50 48   ALT 68* 55*   AST 71* 48*     No results for input(s): RPR in the last 72 hours. No results for input(s): HIV in the last 72 hours. No results for input(s): BC in the last 72 hours. Lab Results   Component Value Date    CREATININE 0.73 08/23/2021    GLUCOSE 130 08/23/2021       Detailed results: Thank you for allowing us to participate in the care of this patient. Please call with questions. This note is created with the assistance of a speech recognition program.  While intending to generate adocument that actually reflects the content of the visit, the document can still have some errors including those of syntax and sound a like substitutions which may escape proof reading. It such instances, actual meaningcan be extrapolated by contextual diversion. Vega Singleton MD  Office: [phone]  Perfect serve / office [phone]      I have discussed the care of the patient, including pertinent history and exam findings,  with the resident. I have seen and examined the patient and the key elements of all parts of the encounter have been performed by me. I agree with the assessment, plan and orders as documented by the resident.     Teena Hopkins, Infectious Diseases

## 2021-08-26 NOTE — PROGRESS NOTES
Kassie Hudson 19    Progress Note    8/26/2021    11:27 AM    Name:   Zenaida Rhoades  MRN:     [id number]     Dumont Butter:      [de-identified]   Room:   [address] [place] [address] Day:  4  Admit Date:  8/22/2021  8:31 PM    PCP:   No primary care provider on file. Code Status:  Full Code    Subjective:     C/C: Shortness of breath     Interval History Status: worsened. Patient was seen and examined at bedside this afternoon. She reports feeling better and states that her shortness of breath is improving. Denies nausea/vomiting or chest pain. Currently on day 5 of Decadron and day 4 of remdesivir. Infectious disease following. Brief History:     (per HPI) Zenaida Rhoades is a 27 y.o. Non- / non  female who presents with shortness of breath and decreasing saturations.      Patient with past medical history of morbid obesity, depression, menorrhagia returns to the ER for shortness of breath and low oxygen saturation episode at home. Patient was in the ER on Saturday for fatigue, fever, dyspnea for 4 days, was diagnosed with Covid with pulse ox as patient was saturating well on room air. Patient states that at home her pulse ox showed saturation of 88% which prompted her to come to ER. Patient also reports constant shortness of breath, more with exertion. She felt febrile.     In the ER patient was noted to be febrile, temperature of 100.9, tachycardic with a temperature of 109, blood pressure 135/96. BMP normal.  Lactic acid 1.3. Troponin less than 6. Vitamin D 7. Review of Systems:     Constitutional:  Positive for fever and chills. Respiratory: Positive for shortness of breath.    Cardiovascular:  negative for chest pain, chest pressure/discomfort, lower extremity edema, palpitations  Gastrointestinal:  negative for abdominal pain, constipation, diarrhea, nausea, vomiting  Neurological:  negative for dizziness, headache    Medications: Allergies:  No Known Allergies    Current Meds:   Scheduled Meds:    sodium chloride flush  5-40 mL IntraVENous 2 times per day    dexamethasone  6 mg Oral Daily    vitamin D  50,000 Units Oral Weekly    enoxaparin  40 mg Subcutaneous BID    ferrous sulfate  325 mg Oral Daily with breakfast    remdesivir IVPB  100 mg IntraVENous Q24H     Continuous Infusions:    sodium chloride       PRN Meds: ibuprofen, sodium chloride flush, sodium chloride, ondansetron **OR** ondansetron, magnesium hydroxide, acetaminophen **OR** acetaminophen, dextromethorphan-guaiFENesin, sodium chloride, albuterol sulfate HFA, benzonatate    Data:     Past Medical History:   has a past medical history of Depression. Social History:   reports that she has been smoking cigars. She started smoking about 18 years ago. She has never used smokeless tobacco. She reports current alcohol use. She reports previous drug use. Drug: Marijuana. Family History:   Family History   Problem Relation Age of Onset    Diabetes Mother        Vitals:  BP (!) 147/85   Pulse 93   Temp 98.1 °F (36.7 °C) (Oral)   Resp 23   Ht 5' 6\" (1.676 m)   Wt (!) 437 lb 2.8 oz (198.3 kg)   SpO2 95%   BMI 70.56 kg/m²   Temp (24hrs), Av.8 °F (37.1 °C), Min:98.1 °F (36.7 °C), Max:99.7 °F (37.6 °C)    No results for input(s): POCGLU in the last 72 hours. I/O (24Hr): No intake or output data in the 24 hours ending 21 1127    Labs:  Hematology:  Recent Labs     21  0832   CRP 17.7* 13.0*   DDIMER 1.00 0.83     Chemistry:  No results for input(s): NA, K, CL, CO2, GLUCOSE, BUN, CREATININE, MG, ANIONGAP, LABGLOM, GFRAA, CALCIUM, CAION, PHOS, PSA, PROBNP, TROPHS, CKTOTAL, CKMB, CKMBINDEX, MYOGLOBIN, DIGOXIN, LACTACIDWB in the last 72 hours.   Recent Labs     21  03321  0832 21  0629   PROT 7.2 7.0 6.8   LABALBU 3.4* 3.3* 3.1*   AST 66* 71* 48*   ALT 49* 68* 55*   ALKPHOS 52 50 48   BILITOT <0.10* <0.10* <0.10* BILIDIR <0.08 <0.08 <0.08     ABG:No results found for: POCPH, PHART, PH, POCPCO2, KIX1UMI, PCO2, POCPO2, PO2ART, PO2, POCHCO3, RSH5IAD, HCO3, NBEA, PBEA, BEART, BE, THGBART, THB, MYI1ZCW, NILC0VWH, X2BTCUKN, O2SAT, FIO2  Lab Results   Component Value Date/Time    SPECIAL NOT REPORTED 08/23/2021 11:23 AM    SPECIAL NOT REPORTED 08/23/2021 11:23 AM     Lab Results   Component Value Date/Time    CULTURE CANCELLED DUPLICATE ORDER 26/17/6380 11:23 AM       Radiology:  XR CHEST PORTABLE    Result Date: 8/22/2021  Mild interstitial infiltrates unchanged     XR CHEST PORTABLE    Result Date: 8/21/2021  Patchy bilateral airspace disease suggestive of multifocal inflammatory process or infection.        Physical Examination:        General appearance:  alert, cooperative and no distress  Mental Status:  oriented to person, place and time and normal affect  Lungs:  Decreased breath sounds bilaterally   Heart:  regular rate and rhythm, no murmur  Abdomen:  soft, nontender, nondistended, normal bowel sounds, no masses, hepatomegaly, splenomegaly  Extremities:  no edema, redness, tenderness in the calves  Skin:  no gross lesions, rashes, induration    Assessment:        Hospital Problems         Last Modified POA    Morbid obesity ([address] 8/23/2021 Yes    Pneumonia due to COVID-19 virus 8/22/2021 Yes    Iron deficiency anemia due to chronic blood loss 8/23/2021 Yes    Sepsis with acute hypoxic respiratory failure without septic shock ([address] 8/23/2021 Yes    Vitamin D deficiency 8/23/2021 Yes          Plan:        Acute hypoxic respiratory failure 2/2 COVID-19   -Continue dexamethasone (day 5)   -Continue remdesivir (day 4)   -Supplemental O2 as needed   -Infectious disease following   -Continue Lovenox 40 mg bid   -Continue supplemental vitamin D   -CRP trending down     Morbid obesity 2/2 excess calories   - on importance of exercise and dietary modifications       Umu Limon MD  8/26/2021  11:27 AM

## 2021-08-27 ENCOUNTER — APPOINTMENT (OUTPATIENT)
Dept: GENERAL RADIOLOGY | Age: 31
DRG: 720 | End: 2021-08-27
Payer: COMMERCIAL

## 2021-08-27 LAB — INTERVENTION: NORMAL

## 2021-08-27 PROCEDURE — 2500000003 HC RX 250 WO HCPCS: Performed by: INTERNAL MEDICINE

## 2021-08-27 PROCEDURE — 99232 SBSQ HOSP IP/OBS MODERATE 35: CPT | Performed by: INTERNAL MEDICINE

## 2021-08-27 PROCEDURE — 1200000000 HC SEMI PRIVATE

## 2021-08-27 PROCEDURE — 6370000000 HC RX 637 (ALT 250 FOR IP): Performed by: INTERNAL MEDICINE

## 2021-08-27 PROCEDURE — 6360000002 HC RX W HCPCS: Performed by: NURSE PRACTITIONER

## 2021-08-27 PROCEDURE — 99232 SBSQ HOSP IP/OBS MODERATE 35: CPT | Performed by: STUDENT IN AN ORGANIZED HEALTH CARE EDUCATION/TRAINING PROGRAM

## 2021-08-27 PROCEDURE — 6360000002 HC RX W HCPCS: Performed by: STUDENT IN AN ORGANIZED HEALTH CARE EDUCATION/TRAINING PROGRAM

## 2021-08-27 PROCEDURE — 6370000000 HC RX 637 (ALT 250 FOR IP): Performed by: STUDENT IN AN ORGANIZED HEALTH CARE EDUCATION/TRAINING PROGRAM

## 2021-08-27 PROCEDURE — 2580000003 HC RX 258: Performed by: INTERNAL MEDICINE

## 2021-08-27 PROCEDURE — 94660 CPAP INITIATION&MGMT: CPT

## 2021-08-27 PROCEDURE — 71045 X-RAY EXAM CHEST 1 VIEW: CPT

## 2021-08-27 PROCEDURE — 6370000000 HC RX 637 (ALT 250 FOR IP): Performed by: EMERGENCY MEDICINE

## 2021-08-27 PROCEDURE — 6370000000 HC RX 637 (ALT 250 FOR IP): Performed by: NURSE PRACTITIONER

## 2021-08-27 PROCEDURE — 2580000003 HC RX 258: Performed by: NURSE PRACTITIONER

## 2021-08-27 RX ORDER — AZITHROMYCIN 500 MG/1
500 TABLET, FILM COATED ORAL DAILY
Qty: 1 TABLET | Refills: 0 | Status: SHIPPED | OUTPATIENT
Start: 2021-08-28 | End: 2021-08-29

## 2021-08-27 RX ORDER — DEXAMETHASONE 6 MG/1
6 TABLET ORAL DAILY
Qty: 5 TABLET | Refills: 0 | Status: SHIPPED | OUTPATIENT
Start: 2021-08-28 | End: 2021-09-02

## 2021-08-27 RX ORDER — HYDRALAZINE HYDROCHLORIDE 20 MG/ML
10 INJECTION INTRAMUSCULAR; INTRAVENOUS ONCE
Status: COMPLETED | OUTPATIENT
Start: 2021-08-27 | End: 2021-08-27

## 2021-08-27 RX ORDER — IBUPROFEN 600 MG/1
600 TABLET ORAL EVERY 8 HOURS PRN
Qty: 90 TABLET | Refills: 0 | Status: SHIPPED | OUTPATIENT
Start: 2021-08-27 | End: 2021-10-28 | Stop reason: ALTCHOICE

## 2021-08-27 RX ADMIN — SODIUM CHLORIDE, PRESERVATIVE FREE 10 ML: 5 INJECTION INTRAVENOUS at 08:04

## 2021-08-27 RX ADMIN — BENZONATATE 100 MG: 100 CAPSULE ORAL at 02:45

## 2021-08-27 RX ADMIN — IBUPROFEN 600 MG: 600 TABLET, FILM COATED ORAL at 14:17

## 2021-08-27 RX ADMIN — GUAIFENESIN DEXTROMETHORPHAN HYDROBROMIDE ORAL SOLUTION 5 ML: 200; 20 SOLUTION ORAL at 14:17

## 2021-08-27 RX ADMIN — GUAIFENESIN DEXTROMETHORPHAN HYDROBROMIDE ORAL SOLUTION 5 ML: 200; 20 SOLUTION ORAL at 02:47

## 2021-08-27 RX ADMIN — ENOXAPARIN SODIUM 40 MG: 40 INJECTION SUBCUTANEOUS at 08:05

## 2021-08-27 RX ADMIN — IBUPROFEN 600 MG: 600 TABLET, FILM COATED ORAL at 02:38

## 2021-08-27 RX ADMIN — IBUPROFEN 600 MG: 600 TABLET, FILM COATED ORAL at 08:05

## 2021-08-27 RX ADMIN — AZITHROMYCIN 500 MG: 250 TABLET, FILM COATED ORAL at 08:04

## 2021-08-27 RX ADMIN — ENOXAPARIN SODIUM 40 MG: 40 INJECTION SUBCUTANEOUS at 20:50

## 2021-08-27 RX ADMIN — FERROUS SULFATE TAB EC 325 MG (65 MG FE EQUIVALENT) 325 MG: 325 (65 FE) TABLET DELAYED RESPONSE at 08:05

## 2021-08-27 RX ADMIN — GUAIFENESIN DEXTROMETHORPHAN HYDROBROMIDE ORAL SOLUTION 5 ML: 200; 20 SOLUTION ORAL at 08:22

## 2021-08-27 RX ADMIN — REMDESIVIR 100 MG: 100 INJECTION, POWDER, LYOPHILIZED, FOR SOLUTION INTRAVENOUS at 14:17

## 2021-08-27 RX ADMIN — MAGNESIUM HYDROXIDE 30 ML: 400 SUSPENSION ORAL at 08:22

## 2021-08-27 RX ADMIN — GUAIFENESIN DEXTROMETHORPHAN HYDROBROMIDE ORAL SOLUTION 5 ML: 200; 20 SOLUTION ORAL at 20:50

## 2021-08-27 RX ADMIN — IBUPROFEN 600 MG: 600 TABLET, FILM COATED ORAL at 18:14

## 2021-08-27 RX ADMIN — BENZONATATE 100 MG: 100 CAPSULE ORAL at 20:50

## 2021-08-27 RX ADMIN — SODIUM CHLORIDE, PRESERVATIVE FREE 10 ML: 5 INJECTION INTRAVENOUS at 20:54

## 2021-08-27 RX ADMIN — HYDRALAZINE HYDROCHLORIDE 10 MG: 20 INJECTION INTRAMUSCULAR; INTRAVENOUS at 21:40

## 2021-08-27 RX ADMIN — DEXAMETHASONE 6 MG: 4 TABLET ORAL at 08:04

## 2021-08-27 ASSESSMENT — PAIN DESCRIPTION - ORIENTATION
ORIENTATION: LOWER

## 2021-08-27 ASSESSMENT — PAIN DESCRIPTION - FREQUENCY
FREQUENCY: CONTINUOUS

## 2021-08-27 ASSESSMENT — PAIN DESCRIPTION - ONSET
ONSET: ON-GOING

## 2021-08-27 ASSESSMENT — PAIN DESCRIPTION - PROGRESSION
CLINICAL_PROGRESSION: NOT CHANGED

## 2021-08-27 ASSESSMENT — PAIN DESCRIPTION - LOCATION
LOCATION: ABDOMEN;BACK
LOCATION: ABDOMEN;CHEST;BACK

## 2021-08-27 ASSESSMENT — PAIN SCALES - GENERAL
PAINLEVEL_OUTOF10: 7
PAINLEVEL_OUTOF10: 8
PAINLEVEL_OUTOF10: 7
PAINLEVEL_OUTOF10: 6
PAINLEVEL_OUTOF10: 7
PAINLEVEL_OUTOF10: 7

## 2021-08-27 ASSESSMENT — PAIN DESCRIPTION - DESCRIPTORS
DESCRIPTORS: ACHING;CRAMPING
DESCRIPTORS: CRAMPING

## 2021-08-27 ASSESSMENT — PAIN DESCRIPTION - PAIN TYPE
TYPE: ACUTE PAIN
TYPE: ACUTE PAIN;OTHER (COMMENT)
TYPE: ACUTE PAIN
TYPE: ACUTE PAIN

## 2021-08-27 NOTE — PROGRESS NOTES
headache    Medications: Allergies:  No Known Allergies    Current Meds:   Scheduled Meds:    azithromycin  500 mg Oral Daily    sodium chloride flush  5-40 mL IntraVENous 2 times per day    dexamethasone  6 mg Oral Daily    vitamin D  50,000 Units Oral Weekly    enoxaparin  40 mg Subcutaneous BID    ferrous sulfate  325 mg Oral Daily with breakfast    remdesivir IVPB  100 mg IntraVENous Q24H     Continuous Infusions:    sodium chloride       PRN Meds: ibuprofen, sodium chloride flush, sodium chloride, ondansetron **OR** ondansetron, magnesium hydroxide, acetaminophen **OR** acetaminophen, dextromethorphan-guaiFENesin, sodium chloride, albuterol sulfate HFA, benzonatate    Data:     Past Medical History:   has a past medical history of Depression. Social History:   reports that she has been smoking cigars. She started smoking about 18 years ago. She has never used smokeless tobacco. She reports current alcohol use. She reports previous drug use. Drug: Marijuana. Family History:   Family History   Problem Relation Age of Onset    Diabetes Mother        Vitals:  BP (!) 161/95   Pulse 71   Temp 98.4 °F (36.9 °C) (Oral)   Resp 20   Ht 5' 6\" (1.676 m)   Wt (!) 441 lb 5.8 oz (200.2 kg)   SpO2 96%   BMI 71.24 kg/m²   Temp (24hrs), Av.2 °F (36.8 °C), Min:98 °F (36.7 °C), Max:98.5 °F (36.9 °C)    No results for input(s): POCGLU in the last 72 hours. I/O (24Hr): Intake/Output Summary (Last 24 hours) at 2021 1043  Last data filed at 2021 2618  Gross per 24 hour   Intake 1080 ml   Output    Net 1080 ml       Labs:  Hematology:  Recent Labs     21  0832   CRP 13.0*   DDIMER 0.83     Chemistry:  No results for input(s): NA, K, CL, CO2, GLUCOSE, BUN, CREATININE, MG, ANIONGAP, LABGLOM, GFRAA, CALCIUM, CAION, PHOS, PSA, PROBNP, TROPHS, CKTOTAL, CKMB, CKMBINDEX, MYOGLOBIN, DIGOXIN, LACTACIDWB in the last 72 hours.   Recent Labs     21  0832 21  0629   PROT 7.0 6.8 LABALBU 3.3* 3.1*   AST 71* 48*   ALT 68* 55*   ALKPHOS 50 48   BILITOT <0.10* <0.10*   BILIDIR <0.08 <0.08     ABG:No results found for: POCPH, PHART, PH, POCPCO2, MTC1ZQT, PCO2, POCPO2, PO2ART, PO2, POCHCO3, JTR1FEG, HCO3, NBEA, PBEA, BEART, BE, THGBART, THB, AMT1IXL, IMXG7OKE, U6GILFGO, O2SAT, FIO2  Lab Results   Component Value Date/Time    SPECIAL NOT REPORTED 08/23/2021 11:23 AM    SPECIAL NOT REPORTED 08/23/2021 11:23 AM     Lab Results   Component Value Date/Time    CULTURE CANCELLED DUPLICATE ORDER 36/83/8410 11:23 AM       Radiology:  XR CHEST PORTABLE    Result Date: 8/22/2021  Mild interstitial infiltrates unchanged     XR CHEST PORTABLE    Result Date: 8/21/2021  Patchy bilateral airspace disease suggestive of multifocal inflammatory process or infection.        Physical Examination:        General appearance:  alert, cooperative and no distress  Mental Status:  oriented to person, place and time and normal affect  Lungs:  Decreased breath sounds bilaterally   Heart:  regular rate and rhythm, no murmur  Abdomen:  soft, nontender, nondistended, normal bowel sounds, no masses, hepatomegaly, splenomegaly  Extremities:  no edema, redness, tenderness in the calves  Skin:  no gross lesions, rashes, induration    Assessment:        Hospital Problems         Last Modified POA    Morbid obesity ([facility] 8/23/2021 Yes    Pneumonia due to COVID-19 virus 8/22/2021 Yes    Iron deficiency anemia due to chronic blood loss 8/23/2021 Yes    Sepsis with acute hypoxic respiratory failure without septic shock ([facility] 8/23/2021 Yes    Vitamin D deficiency 8/23/2021 Yes          Plan:        Acute hypoxic respiratory failure 2/2 COVID-19   -Continue dexamethasone (day 5)   -Continue remdesivir (day 5)   -Supplemental O2 as needed   -Infectious disease following   -Continue Lovenox 40 mg bid   -Continue supplemental vitamin D     Morbid obesity 2/2 excess calories   - on importance of exercise and dietary modifications Mary Garcia MD  8/27/2021  10:43 AM

## 2021-08-27 NOTE — PROGRESS NOTES
CLINICAL PHARMACY NOTE: MEDS TO BEDS    Total # of Prescriptions Filled: 3   The following medications were delivered to the patient:  · Azithromycin 500  · Dexamethasone 6  · Ibuprofen 600    Additional Documentation:  Left with nurse at nurses station

## 2021-08-28 VITALS
OXYGEN SATURATION: 95 % | HEART RATE: 56 BPM | HEIGHT: 66 IN | WEIGHT: 293 LBS | DIASTOLIC BLOOD PRESSURE: 101 MMHG | TEMPERATURE: 98.2 F | RESPIRATION RATE: 18 BRPM | SYSTOLIC BLOOD PRESSURE: 158 MMHG | BODY MASS INDEX: 47.09 KG/M2

## 2021-08-28 LAB
CULTURE: NORMAL
CULTURE: NORMAL
Lab: NORMAL
Lab: NORMAL
SPECIMEN DESCRIPTION: NORMAL
SPECIMEN DESCRIPTION: NORMAL

## 2021-08-28 PROCEDURE — 6370000000 HC RX 637 (ALT 250 FOR IP): Performed by: STUDENT IN AN ORGANIZED HEALTH CARE EDUCATION/TRAINING PROGRAM

## 2021-08-28 PROCEDURE — 99232 SBSQ HOSP IP/OBS MODERATE 35: CPT | Performed by: INTERNAL MEDICINE

## 2021-08-28 PROCEDURE — 94618 PULMONARY STRESS TESTING: CPT

## 2021-08-28 PROCEDURE — 6370000000 HC RX 637 (ALT 250 FOR IP): Performed by: INTERNAL MEDICINE

## 2021-08-28 PROCEDURE — 99238 HOSP IP/OBS DSCHRG MGMT 30/<: CPT | Performed by: STUDENT IN AN ORGANIZED HEALTH CARE EDUCATION/TRAINING PROGRAM

## 2021-08-28 PROCEDURE — 2580000003 HC RX 258: Performed by: NURSE PRACTITIONER

## 2021-08-28 PROCEDURE — 6370000000 HC RX 637 (ALT 250 FOR IP): Performed by: EMERGENCY MEDICINE

## 2021-08-28 PROCEDURE — 6360000002 HC RX W HCPCS: Performed by: STUDENT IN AN ORGANIZED HEALTH CARE EDUCATION/TRAINING PROGRAM

## 2021-08-28 RX ORDER — LISINOPRIL 10 MG/1
10 TABLET ORAL DAILY
Status: DISCONTINUED | OUTPATIENT
Start: 2021-08-28 | End: 2021-08-28 | Stop reason: HOSPADM

## 2021-08-28 RX ORDER — LISINOPRIL 10 MG/1
10 TABLET ORAL DAILY
Qty: 30 TABLET | Refills: 3 | Status: SHIPPED | OUTPATIENT
Start: 2021-08-28

## 2021-08-28 RX ADMIN — IBUPROFEN 600 MG: 600 TABLET, FILM COATED ORAL at 00:19

## 2021-08-28 RX ADMIN — IBUPROFEN 600 MG: 600 TABLET, FILM COATED ORAL at 08:10

## 2021-08-28 RX ADMIN — LISINOPRIL 10 MG: 10 TABLET ORAL at 09:34

## 2021-08-28 RX ADMIN — SODIUM CHLORIDE, PRESERVATIVE FREE 10 ML: 5 INJECTION INTRAVENOUS at 08:10

## 2021-08-28 RX ADMIN — AZITHROMYCIN 500 MG: 250 TABLET, FILM COATED ORAL at 08:10

## 2021-08-28 RX ADMIN — FERROUS SULFATE TAB EC 325 MG (65 MG FE EQUIVALENT) 325 MG: 325 (65 FE) TABLET DELAYED RESPONSE at 08:10

## 2021-08-28 RX ADMIN — DEXAMETHASONE 6 MG: 4 TABLET ORAL at 08:10

## 2021-08-28 RX ADMIN — BENZONATATE 100 MG: 100 CAPSULE ORAL at 08:17

## 2021-08-28 ASSESSMENT — PAIN DESCRIPTION - ONSET
ONSET: ON-GOING
ONSET: ON-GOING

## 2021-08-28 ASSESSMENT — PAIN DESCRIPTION - LOCATION
LOCATION: BACK;ABDOMEN
LOCATION: ABDOMEN;BACK

## 2021-08-28 ASSESSMENT — ENCOUNTER SYMPTOMS
COLOR CHANGE: 0
EYE PAIN: 0
COUGH: 0
SHORTNESS OF BREATH: 1
WHEEZING: 0
EYE DISCHARGE: 0
ABDOMINAL DISTENTION: 0
EYE REDNESS: 0
STRIDOR: 0
EYE ITCHING: 0
APNEA: 0
PHOTOPHOBIA: 0

## 2021-08-28 ASSESSMENT — PAIN SCALES - GENERAL
PAINLEVEL_OUTOF10: 8
PAINLEVEL_OUTOF10: 7
PAINLEVEL_OUTOF10: 5
PAINLEVEL_OUTOF10: 6

## 2021-08-28 ASSESSMENT — PAIN DESCRIPTION - PROGRESSION
CLINICAL_PROGRESSION: NOT CHANGED
CLINICAL_PROGRESSION: NOT CHANGED

## 2021-08-28 ASSESSMENT — PAIN DESCRIPTION - PAIN TYPE
TYPE: ACUTE PAIN
TYPE: ACUTE PAIN

## 2021-08-28 ASSESSMENT — PAIN DESCRIPTION - FREQUENCY
FREQUENCY: CONTINUOUS
FREQUENCY: CONTINUOUS

## 2021-08-28 ASSESSMENT — PAIN DESCRIPTION - ORIENTATION
ORIENTATION: LOWER
ORIENTATION: LOWER

## 2021-08-28 ASSESSMENT — PAIN DESCRIPTION - DESCRIPTORS
DESCRIPTORS: CRAMPING
DESCRIPTORS: CRAMPING

## 2021-08-28 NOTE — CARE COORDINATION
Discharge [facility] Case Management Department  Written by: Mirela Ordonez RN    Patient Name: Zenaida Rhoades  Attending Provider: No att. providers found  Admit Date: 2021  8:31 PM  MRN: [id number]  Account: [de-identified]                     : 1990  Discharge Date: 2021      Disposition: home    Mirela Ordonez RN

## 2021-08-28 NOTE — PROGRESS NOTES
Home Oxygen Evaluation    Home Oxygen Evaluation completed. Patient is on room air at rest.       Resting SpO2 on room air = 97%    SpO2 on room air with exercise = 90%  SpO2 on oxygen with exercise = NA    Nocturnal Oximetry with patient on room air is recommended is SpO2 is between 89% and 95% (requires additional order).     CORBIN RODRIGUES RCP  9:38 AM

## 2021-08-28 NOTE — PROGRESS NOTES
[facility] -   Infectious diseases evaluation  admission date 8/22/2021    reason for consultation:   covid    Impression :   Current:  · covid pneumonia and fever desaturation  · Elevated CRP    Other:  · Smoker and obese  Discussion / summary of stay / plan of care   ·   Recommendations   · Due to rapid worsening and desaturation 88 in ER, start remdesevir 5 days till 8/27  · FU LFT  · lovenox, recently shown improved outcome when started early covid  · On decadron, by medicine -   · Inflammatory numbers seem to be improving and she is improving clinically.     · 8/26 bacterial bronchitis with brown phlegm, started azithromycin for bronchitis X 3 days  · Okay to make remdesivir in the morning of 8/27 to facilitate early discharge  · smoker      Infection Control Recommendations   · [place] Precautions  · Contact Isolation   · Airborne isolation  · Droplet Isolation      Antimicrobial Stewardship Recommendations   · Simplification of therapy  · Targeted therapy      Coordination ofOutpatient Care:   · Estimated Length of IV antimicrobials:  · Patient will need Midline / picc Catheter Insertion:   · Patient will need SNF:  · Patient will need outpatient wound care:     History of Present Illness:   Initial history:  Sanford Verma is a 27y.o.-year-old female comes for SOB cough x 7 days PTA and desaturation at home, 88 So2, fever and tachycardia and tachypnea,  bilat infil on CXr and CRP slightly high  On RA since admission though  But per ER, progressed and got worse within 1 days in ER        Interval changes  8/28/2021   Patient Vitals for the past 8 hrs:   BP Temp Temp src Pulse Resp SpO2   08/27/21 2140 (!) 166/103        08/27/21 2044 (!) 166/72 98.9 °F (37.2 °C) Oral 62 22 96 %   08/27/21 2000    80     08/27/21 1809 (!) 150/98 98.6 °F (37 °C) Oral 81 20 96 %     Feels and looks better, her breathing is improved, she is not short of breath, oxygenating very well on 2 L of oxygen, CRP is 16 and ferritin is normal      8/26  Patient seen at bedside. Tolerating RA. Afebrile. Says she is SOB when getting out  Of bed. Still is coughing some brownish-yellow phlegm concerning for bacterial infection due to history of smoking    CRP 39 liver enzymes normal  We will add a Z-Michael for the bacterial bronchitis    8/28  Alert and less SOB - on RA  Ab soft - pleuritic chest pain  Eating better  CRP better      Summary of relevant labs:  Labs:  Procalcitonin0. 09High-0.08   WBC4.2   D-Dimer, LMLQX7.321  [id number]   CRP26.1High  - 17  Ferritin 111    Micro:  covid + 8/21/21  Sputum culture mixed bacteria imaging:  CXR   Mild interstitial infiltrates unchanged       I have personally reviewed the past medical history, past surgical history, medications, social history, and family history, and I haveupdated the database accordingly. Allergies:   Patient has no known allergies. Review of Systems:     Review of Systems   Constitutional: Negative for activity change, appetite change, fatigue and fever. HENT: Negative for congestion. Eyes: Negative for photophobia, pain and visual disturbance. Respiratory: Positive for shortness of breath. Negative for apnea, cough, wheezing and stridor. Cardiovascular: Positive for chest pain. Negative for palpitations and leg swelling. Gastrointestinal: Negative for abdominal distention. Endocrine: Negative for heat intolerance, polydipsia and polyphagia. Genitourinary: Negative for dysuria. Musculoskeletal: Negative for arthralgias. Skin: Negative for color change. Allergic/Immunologic: Negative for immunocompromised state. Neurological: Negative for dizziness. Hematological: Negative for adenopathy. Psychiatric/Behavioral: Negative for agitation. Physical Examination :       Physical Exam  Constitutional:       Appearance: She is obese. She is not ill-appearing or diaphoretic.    HENT:  Years of education: Not on file    Highest education level: Not on file   Occupational History    Not on file   Tobacco Use    Smoking status: Current Every Day Smoker     Types: Cigars     Start date: 2003    Smokeless tobacco: Never Used    Tobacco comment: 1-2 black and milds daily   Vaping Use    Vaping Use: Never used   Substance and Sexual Activity    Alcohol use: Yes     Comment: Occasionally     Drug use: Not Currently     Types: Marijuana     Comment: daily use    Sexual activity: Yes     Partners: Male     Birth control/protection: I.U.D. Other Topics Concern    Not on file   Social History Narrative    Not on file     Social Determinants of Health     Financial Resource Strain:     Difficulty of Paying Living Expenses:    Food Insecurity:     Worried About Running Out of Food in the Last Year:     [address] in the Last Year:    Transportation Needs:     Lack of Transportation (Medical):  Lack of Transportation (Non-Medical):    Physical Activity:     Days of Exercise per Week:     Minutes of Exercise per Session:    Stress:     Feeling of Stress :    Social Connections:     Frequency of Communication with Friends and Family:     Frequency of Social Gatherings with Friends and Family:     Attends [employer] Services:     Active Member of Clubs or Organizations:     Attends Club or Organization Meetings:     Marital Status:    Intimate Partner Violence:     Fear of Current or Ex-Partner:     Emotionally Abused:     Physically Abused:     Sexually Abused:        Family History:     Family History   Problem Relation Age of Onset    Diabetes Mother       Medical Decision Making:   I have independently reviewed/ordered the following labs:    CBC with Differential:   No results for input(s): WBC, HGB, HCT, PLT, SEGSPCT, BANDSPCT, LYMPHOPCT, MONOPCT, EOSPCT in the last 72 hours.   BMP:  No results for input(s): NA, K, CL, CO2, BUN, CREATININE, MG in the last 72 hours.    Invalid input(s): CA  Hepatic Function Panel:   Recent Labs     08/25/21  0832 08/26/21  0629   PROT 7.0 6.8   LABALBU 3.3* 3.1*   BILIDIR <0.08 <0.08   IBILI CANNOT BE CALCULATED CANNOT BE CALCULATED   BILITOT <0.10* <0.10*   ALKPHOS 50 48   ALT 68* 55*   AST 71* 48*     No results for input(s): RPR in the last 72 hours. No results for input(s): HIV in the last 72 hours. No results for input(s): BC in the last 72 hours. Lab Results   Component Value Date    CREATININE 0.73 08/23/2021    GLUCOSE 130 08/23/2021       Detailed results: Thank you for allowing us to participate in the care of this patient. Please call with questions. This note is created with the assistance of a speech recognition program.  While intending to generate adocument that actually reflects the content of the visit, the document can still have some errors including those of syntax and sound a like substitutions which may escape proof reading. It such instances, actual meaningcan be extrapolated by contextual diversion.     Jessica Olguin MD  Office: [phone]  Perfect serve / office [phone]

## 2021-08-28 NOTE — DISCHARGE SUMMARY
[facility]  Office: [address], DO, Jesus Gear, DO, Alda Field, DO, Mehuleverett Boswell Blood, DO, Ajith Martinez MD, Ramesh Brock MD, Olaf Olivas MD, Braydon Short MD, Meg Nguyen MD, Pallavi Arriaga MD, Diana Mcnulty MD, Savanah Cuevas, DO, Annamarie Beck MD, Richy Quintanilla, DO, Lolita Mcdonald MD,  Allegra Lucia, DO, Cedric Mcdonald MD, Isaiah Hawkins MD, July Edward MD, Ayde Prado MD, Pasquale Amos MD, Viviana Cheng MD, Eufemia Navarro MD, Ekta Mayer [facility], [facility], CNP, Jaydon Lopez, CNP, Florinda Mask, CNS, [place], CNP, Jennifer Can, CNP, Bryn Miranda, CNP, Russell Royal, CNP, Evangelist Miller, CNP, Lendel Nissen, PA-C, Judd Andres, [facility], Cornelia Rasheed, CNP, Rach Handy, CNP, Estee Jc, CNP, Torsten Maher, CNP, Traci Thomson, CNP, Nick Donahue, CNP, Esther Shore, CNP, [facility], [address] [place] [address]    Discharge Summary     Patient ID: Roman Houston  :  1990   MRN: [id number]     ACCOUNT:  [de-identified]   Patient's PCP: No primary care provider on file. Admit Date: 2021   Discharge Date: 2021     Length of Stay: 6  Code Status:  Full Code  Admitting Physician: July Edward MD  Discharge Physician: Ayde Prado MD     Active Discharge Diagnoses:     Hospital Problem Lists:  Active Problems: Morbid obesity ([address]    Pneumonia due to COVID-19 virus    Iron deficiency anemia due to chronic blood loss    Sepsis with acute hypoxic respiratory failure without septic shock (HCC)    Vitamin D deficiency  Resolved Problems:    * No resolved hospital problems.  *      Admission Condition:  poor     Discharged Condition: good    Hospital Stay:     Hospital Course:  Roman Houston is a 27 y.o. female with a past medical history of morbid obesity and recently diagnosed COVID-19 pneumonia (positive test on ) who presented to the emergency department on 2021 complaining of shortness of breath. She was hypoxic and febrile in the emergency department and was subsequently admitted to internal medicine for management of acute hypoxic respiratory failure secondary to COVID-19 pneumonia. The patient was treated with dexamethasone and remdesvir and improved throughout the course of hospitalization. She is discharged today (8/28) in stable condition. The patient is instructed to complete an additional five-day course of dexamethasone and follow-up with the primary care physician of her choice. She is further instructed to have a sleep study done outpatient. Significant therapeutic interventions: Remdesivir; dexamethasone     Significant Diagnostic Studies:   Labs / Micro:  BMP:    Lab Results   Component Value Date    GLUCOSE 130 08/23/2021     08/23/2021    K 4.4 08/23/2021     08/23/2021    CO2 22 08/23/2021    ANIONGAP 13 08/23/2021    BUN 6 08/23/2021    CREATININE 0.73 08/23/2021    BUNCRER NOT REPORTED 08/23/2021    CALCIUM 8.6 08/23/2021    LABGLOM >60 08/23/2021    GFRAA >60 08/23/2021    GFR      08/23/2021    GFR NOT REPORTED 08/23/2021        Radiology:  XR CHEST PORTABLE    Result Date: 8/27/2021  Low lung volumes. Diffuse hazy density and interstitial thickening is slightly increased when compared to previous radiographs. XR CHEST PORTABLE    Result Date: 8/22/2021  Mild interstitial infiltrates unchanged     XR CHEST PORTABLE    Result Date: 8/21/2021  Patchy bilateral airspace disease suggestive of multifocal inflammatory process or infection. Consultations:    Consults:     Final Specialist Recommendations/Findings:   IP CONSULT TO HOSPITALIST  IP CONSULT TO INFECTIOUS DISEASES  IP CONSULT TO PHARMACY      The patient was seen and examined on day of discharge and this discharge summary is in conjunction with any daily progress note from day of discharge.     Discharge plan:     Disposition: Home    Physician Follow Up:     Sunny Lee Beulah Gaffney, [facility], [address] [place], [place] [phone]    Schedule an appointment as soon as possible for a visit in 2 weeks  Recent hospitalization with [address]  [phone]    If symptoms worsen       Diet: regular diet    Activity: As tolerated    Instructions to Patient: Complete five additional days of dexamethasone; follow-up with infectious disease as well as a primary care doctor of your choice. Call to schedule a sleep study to have your CPAP titrated.      Discharge Medications:      Medication List      START taking these medications    azithromycin 500 MG tablet  Commonly known as: ZITHROMAX  Take 1 tablet by mouth daily for 1 dose     dexamethasone 6 MG tablet  Commonly known as: DECADRON  Take 1 tablet by mouth daily for 5 doses     lisinopril 10 MG tablet  Commonly known as: PRINIVIL;ZESTRIL  Take 1 tablet by mouth daily        CHANGE how you take these medications    ibuprofen 600 MG tablet  Commonly known as: ADVIL;MOTRIN  Take 1 tablet by mouth every 8 hours as needed for Pain or Fever  What changed:   · when to take this  · reasons to take this        CONTINUE taking these medications    albuterol sulfate  (90 Base) MCG/ACT inhaler  Commonly known as: Ventolin HFA  Inhale 2 puffs into the lungs 4 times daily as needed for Wheezing     norethindrone-ethinyl estradiol-Fe 1 MG-10 MCG / 10 MCG tablet  Commonly known as: LO LOESTRIN FE  Take 1 tablet by mouth daily        STOP taking these medications    norethindrone-ethinyl estradiol 1-20 MG-MCG per tablet  Commonly known as: Loestrin Fe 1/20           Where to Get Your Medications      [address] [place] [address]    Phone: [phone]   · azithromycin 500 MG tablet  · dexamethasone 6 MG tablet  · ibuprofen 600 MG tablet  · lisinopril 10 MG tablet         Time Spent on discharge is  20 mins in patient examination, evaluation, counseling as well as medication reconciliation, prescriptions for required medications, discharge plan and follow up.     Electronically signed by   Carloz Moran MD  8/28/2021  8:37 AM

## 2021-08-28 NOTE — PROGRESS NOTES
Writer notified NP with internal medicine regarding the pts BP being 166/72 (91) HR 55. Order for one time dose of IV hydralazine placed and administered. Repeat /86 (104).

## 2021-08-28 NOTE — PROGRESS NOTES
[facility] -   Infectious diseases evaluation  admission date 8/22/2021    reason for consultation:   covid    Impression :   Current:  · covid pneumonia and fever desaturation  · Elevated CRP    Other:  · Smoker and obese  Discussion / summary of stay / plan of care   ·   Recommendations   · Due to rapid worsening and desaturation 87 in ER, start remdesevir 5 days till 8/27  · FU LFT  · lovenox, recently shown improved outcome when started early covid  · On decadron, by medicine -   · Inflammatory numbers seem to be improving and she is improving clinically.     · 8/26 bacterial bronchitis with brown phlegm, started azithromycin for bronchitis X 3 days  · OK from ID to DC   · smoker      Infection Control Recommendations   · [place] Precautions  · Contact Isolation   · Airborne isolation  · Droplet Isolation      Antimicrobial Stewardship Recommendations   · Simplification of therapy  · Targeted therapy      Coordination ofOutpatient Care:   · Estimated Length of IV antimicrobials:  · Patient will need Midline / picc Catheter Insertion:   · Patient will need SNF:  · Patient will need outpatient wound care:     History of Present Illness:   Initial history:  Anjelica Loza is a 27y.o.-year-old female comes for SOB cough x 7 days PTA and desaturation at home, 88 So2, fever and tachycardia and tachypnea,  bilat infil on CXr and CRP slightly high  On RA since admission though  But per ER, progressed and got worse within 1 days in ER        Interval changes  8/28/2021   Patient Vitals for the past 8 hrs:   BP Temp Temp src Pulse Resp SpO2 Weight   08/28/21 0807 (!) 158/101         08/28/21 0800 (!) 161/102 98.2 °F (36.8 °C) Oral   95 %    08/28/21 0424 (!) 151/88 98.2 °F (36.8 °C) Oral 56 18 95 % (!) 441 lb 12.8 oz (200.4 kg)   08/28/21 0400    64        Feels and looks better, her breathing is improved, she is not short of breath, oxygenating very well on 2 L of oxygen, CRP is 16 and ferritin is normal      8/26  Patient seen at bedside. Tolerating RA. Afebrile. Says she is SOB when getting out  Of bed. Still is coughing some brownish-yellow phlegm concerning for bacterial infection due to history of smoking    CRP 39 liver enzymes normal  We will add a Z-Michael for the bacterial bronchitis    8/27  Alert and less SOB - on RA  Ab soft - pleuritic chest pain  Eating better  CRP better    8/28  Alert. Tolerating RA  Still having cough  Improving SOB       Summary of relevant labs:  Labs:  Procalcitonin0. 09High-0.08   WBC4.2   D-Dimer, TUGOP9.796  ME344Erux   CRP26.1High  - 17  Ferritin 111    Micro:  covid + 8/21/21  Sputum culture mixed bacteria imaging:  CXR   Mild interstitial infiltrates unchanged       I have personally reviewed the past medical history, past surgical history, medications, social history, and family history, and I haveupdated the database accordingly. Allergies:   Patient has no known allergies. Review of Systems:     Review of Systems   Constitutional: Negative for activity change, appetite change, fatigue and fever. HENT: Negative for congestion. Eyes: Negative for photophobia, pain, discharge, redness, itching and visual disturbance. Respiratory: Positive for shortness of breath. Negative for apnea, cough, wheezing and stridor. Cardiovascular: Positive for chest pain. Negative for palpitations and leg swelling. Gastrointestinal: Negative for abdominal distention. Endocrine: Negative for heat intolerance, polydipsia and polyphagia. Genitourinary: Negative for difficulty urinating, dyspareunia and dysuria. Musculoskeletal: Negative for arthralgias. Skin: Negative for color change. Allergic/Immunologic: Negative for immunocompromised state. Neurological: Negative for dizziness, tremors, syncope and speech difficulty. Hematological: Negative for adenopathy. Psychiatric/Behavioral: Negative for agitation. Physical Examination :       Physical Exam  Constitutional:       General: She is not in acute distress. Appearance: She is obese. She is not ill-appearing, toxic-appearing or diaphoretic. HENT:      Head: Normocephalic and atraumatic. Nose: Nose normal.      Mouth/Throat:      Mouth: Mucous membranes are moist.   Eyes:      General: No scleral icterus. Conjunctiva/sclera: Conjunctivae normal.   Cardiovascular:      Rate and Rhythm: Normal rate and regular rhythm. Heart sounds: Normal heart sounds. No murmur heard. No friction rub. No gallop. Pulmonary:      Effort: No respiratory distress. Breath sounds: Normal breath sounds. No stridor. No wheezing, rhonchi or rales. Abdominal:      General: There is no distension. Palpations: Abdomen is soft. There is no mass. Tenderness: There is no abdominal tenderness. There is no guarding or rebound. Hernia: No hernia is present. Genitourinary:     Comments: No kerr  Musculoskeletal:         General: No swelling, tenderness, deformity or signs of injury. Cervical back: Neck supple. No rigidity or tenderness. Right lower leg: No edema. Left lower leg: No edema. Skin:     General: Skin is dry. Coloration: Skin is not jaundiced. Neurological:      General: No focal deficit present. Mental Status: She is alert and oriented to person, place, and time. Psychiatric:         Mood and Affect: Mood normal.         Thought Content:  Thought content normal.         Past Medical History:     Past Medical History:   Diagnosis Date    Depression 5/1/2017       Past Surgical  History:     Past Surgical History:   Procedure Laterality Date    WISDOM TOOTH EXTRACTION Left 2014       Medications:      lisinopril  10 mg Oral Daily    sodium chloride flush  5-40 mL IntraVENous 2 times per day    dexamethasone  6 mg Oral Daily    vitamin D  50,000 Units Oral Weekly    enoxaparin  40 mg Subcutaneous BID    ferrous sulfate  325 mg Oral Daily with breakfast       Social History:     Social History     Socioeconomic History    Marital status: Single     Spouse name: Not on file    Number of children: Not on file    Years of education: Not on file    Highest education level: Not on file   Occupational History    Not on file   Tobacco Use    Smoking status: Current Every Day Smoker     Types: Cigars     Start date: 2003    Smokeless tobacco: Never Used    Tobacco comment: 1-2 black and milds daily   Vaping Use    Vaping Use: Never used   Substance and Sexual Activity    Alcohol use: Yes     Comment: Occasionally     Drug use: Not Currently     Types: Marijuana     Comment: daily use    Sexual activity: Yes     Partners: Male     Birth control/protection: I.U.D. Other Topics Concern    Not on file   Social History Narrative    Not on file     Social Determinants of Health     Financial Resource Strain:     Difficulty of Paying Living Expenses:    Food Insecurity:     Worried About Running Out of Food in the Last Year:     [address] in the Last Year:    Transportation Needs:     Lack of Transportation (Medical):      Lack of Transportation (Non-Medical):    Physical Activity:     Days of Exercise per Week:     Minutes of Exercise per Session:    Stress:     Feeling of Stress :    Social Connections:     Frequency of Communication with Friends and Family:     Frequency of Social Gatherings with Friends and Family:     Attends [employer] Services:     Active Member of Clubs or Organizations:     Attends Club or Organization Meetings:     Marital Status:    Intimate Partner Violence:     Fear of Current or Ex-Partner:     Emotionally Abused:     Physically Abused:     Sexually Abused:        Family History:     Family History   Problem Relation Age of Onset    Diabetes Mother       Medical Decision Making:   I have independently reviewed/ordered the following labs:    CBC with Differential:   No results for input(s): WBC, HGB, HCT, PLT, SEGSPCT, BANDSPCT, LYMPHOPCT, MONOPCT, EOSPCT in the last 72 hours. BMP:  No results for input(s): NA, K, CL, CO2, BUN, CREATININE, MG in the last 72 hours. Invalid input(s): CA  Hepatic Function Panel:   Recent Labs     08/26/21  0629   PROT 6.8   LABALBU 3.1*   BILIDIR <0.08   IBILI CANNOT BE CALCULATED   BILITOT <0.10*   ALKPHOS 48   ALT 55*   AST 48*     No results for input(s): RPR in the last 72 hours. No results for input(s): HIV in the last 72 hours. No results for input(s): BC in the last 72 hours. Lab Results   Component Value Date    CREATININE 0.73 08/23/2021    GLUCOSE 130 08/23/2021       Detailed results: Thank you for allowing us to participate in the care of this patient. Please call with questions. This note is created with the assistance of a speech recognition program.  While intending to generate adocument that actually reflects the content of the visit, the document can still have some errors including those of syntax and sound a like substitutions which may escape proof reading. It such instances, actual meaningcan be extrapolated by contextual diversion. Juana Boone MD  Office: [phone]  Perfect serve / office [phone]        I have discussed the care of the patient, including pertinent history and exam findings,  with the resident. I have seen and examined the patient and the key elements of all parts of the encounter have been performed by me. I agree with the assessment, plan and orders as documented by the resident.     Teena Hopkins, Infectious Diseases

## 2021-08-28 NOTE — PROGRESS NOTES
Pt. Margarita Frame down to car in wheelchair. Discharge instructions reviewed with pt., understanding verb. Pt. Sent home with meds to beds and all personal belongings.  Also sent with PCP list.

## 2021-08-28 NOTE — PLAN OF CARE
Problem: Airway Clearance - Ineffective  Goal: Achieve or maintain patent airway  Outcome: Ongoing     Problem: Gas Exchange - Impaired  Goal: Absence of hypoxia  Outcome: Ongoing  Goal: Promote optimal lung function  Outcome: Ongoing     Problem: Breathing Pattern - Ineffective  Goal: Ability to achieve and maintain a regular respiratory rate  Outcome: Ongoing     Problem:  Body Temperature -  Risk of, Imbalanced  Goal: Complications related to the disease process, condition or treatment will be avoided or minimized  Outcome: Ongoing     Problem: Isolation Precautions - Risk of Spread of Infection  Goal: Prevent transmission of infection  Outcome: Ongoing     Problem: Nutrition Deficits  Goal: Optimize nutritional status  Outcome: Ongoing     Problem: Risk for Fluid Volume Deficit  Goal: Maintain normal heart rhythm  Outcome: Ongoing  Goal: Maintain absence of muscle cramping  Outcome: Ongoing  Goal: Maintain normal serum potassium, sodium, calcium, phosphorus, and pH  Outcome: Ongoing     Problem: Loneliness or Risk for Loneliness  Goal: Demonstrate positive use of time alone when socialization is not possible  Outcome: Ongoing     Problem: Fatigue  Goal: Verbalize increase energy and improved vitality  Outcome: Ongoing     Problem: Patient Education: Go to Patient Education Activity  Goal: Patient/Family Education  Outcome: Ongoing     Problem: Falls - Risk of:  Goal: Will remain free from falls  Description: Will remain free from falls  Outcome: Ongoing  Goal: Absence of physical injury  Description: Absence of physical injury  Outcome: Ongoing     Problem: Pain:  Goal: Pain level will decrease  Description: Pain level will decrease  Outcome: Ongoing  Goal: Control of acute pain  Description: Control of acute pain  Outcome: Ongoing  Goal: Control of chronic pain  Description: Control of chronic pain  Outcome: Ongoing

## 2021-08-30 ENCOUNTER — CARE COORDINATION (OUTPATIENT)
Dept: CASE MANAGEMENT | Age: 31
End: 2021-08-30

## 2021-08-30 NOTE — CARE COORDINATION
Karlo Pinedoi   10/4/2021 10:00 AM SCHEDULE, ULTRASOUND [facility] [address] follow up appointment(s):     Non-face-to-face services provided:  Scheduled appointment with PCP-state she hasn't called yet but will   Scheduled appointment with Specialist-hasn't called yet to ID Dr Yeyo Salcedo but will   Obtained and reviewed discharge summary and/or continuity of care documents  Assessment and support for treatment adherence and medication management-confirms new meds     Advance Care Planning:   Does patient have an Advance Directive:  decision maker updated. Educated patient about risk for severe COVID-19 due to risk factors according to CDC guidelines. CTN reviewed discharge instructions, medical action plan and red flag symptoms with the patient who verbalized understanding. Discussed COVID vaccination status: Yes and has not been vaccinated. Education provided on COVID-19 vaccination as appropriate. Discussed exposure protocols and quarantine with CDC Guidelines. Patient was given an opportunity to verbalize any questions and concerns and agrees to contact CTN or health care provider for questions related to their healthcare. Reviewed and educated patient on any new and changed medications related to discharge diagnosis     Was patient discharged with a pulse oximeter? No but has one at home. Discussed and confirmed pulse oximeter discharge instructions and when to notify provider or seek emergency care. CTN provided contact information. Plan for follow-up call in 3-5 days based on severity of symptoms and risk factors.         Care Transitions 24 Hour Call    Do you have any ongoing symptoms?: No  Do you have a copy of your discharge instructions?: Yes  Do you have all of your prescriptions and are they filled?: Yes  Have you been contacted by a [facility] Pharmacist?: No  Have you scheduled your follow up appointment?: No  Were you discharged with any Home Care or Post Acute Services: No  Do you feel like you have everything you need to keep you well at home?: Yes  Care Transitions Interventions         Follow Up  Future Appointments   Date Time Provider Karlo Corea   10/4/2021 10:00 AM SCHEDULE, ULTRASOUND [facility] Cisco Andino, RN

## 2021-09-02 ENCOUNTER — CARE COORDINATION (OUTPATIENT)
Dept: CASE MANAGEMENT | Age: 31
End: 2021-09-02

## 2021-09-02 ENCOUNTER — HOSPITAL ENCOUNTER (EMERGENCY)
Age: 31
Discharge: HOME | End: 2021-09-02
Attending: EMERGENCY MEDICINE
Payer: COMMERCIAL

## 2021-09-02 ENCOUNTER — APPOINTMENT (OUTPATIENT)
Dept: GENERAL RADIOLOGY | Age: 31
End: 2021-09-02
Payer: COMMERCIAL

## 2021-09-02 VITALS
DIASTOLIC BLOOD PRESSURE: 84 MMHG | TEMPERATURE: 98.6 F | BODY MASS INDEX: 47.09 KG/M2 | RESPIRATION RATE: 18 BRPM | HEART RATE: 82 BPM | HEIGHT: 66 IN | OXYGEN SATURATION: 99 % | SYSTOLIC BLOOD PRESSURE: 136 MMHG | WEIGHT: 293 LBS

## 2021-09-02 DIAGNOSIS — U07.1 COVID-19: Primary | ICD-10-CM

## 2021-09-02 LAB
TROPONIN INTERP: NORMAL
TROPONIN T: NORMAL NG/ML
TROPONIN, HIGH SENSITIVITY: 13 NG/L (ref 0–14)

## 2021-09-02 PROCEDURE — 71045 X-RAY EXAM CHEST 1 VIEW: CPT

## 2021-09-02 PROCEDURE — 93005 ELECTROCARDIOGRAM TRACING: CPT | Performed by: STUDENT IN AN ORGANIZED HEALTH CARE EDUCATION/TRAINING PROGRAM

## 2021-09-02 PROCEDURE — 99282 EMERGENCY DEPT VISIT SF MDM: CPT

## 2021-09-02 PROCEDURE — 84484 ASSAY OF TROPONIN QUANT: CPT

## 2021-09-02 PROCEDURE — 99281 EMR DPT VST MAYX REQ PHY/QHP: CPT

## 2021-09-02 ASSESSMENT — ENCOUNTER SYMPTOMS
VOMITING: 0
DIARRHEA: 0
NAUSEA: 0
RHINORRHEA: 0
ABDOMINAL PAIN: 0
SHORTNESS OF BREATH: 1
SORE THROAT: 0
COUGH: 0

## 2021-09-02 NOTE — ED NOTES
Pt resting in bed  Pt states SOB since 9/1/21  Pt states right arm  Is cold and tingling  Pt denies fevers  Pt ambulated to room     Manas Walker LPN  29/04/12 0140

## 2021-09-02 NOTE — ED PROVIDER NOTES
[facility] ED  Emergency Department Encounter  Emergency Medicine Resident     Pt Name: Alison Armenta  MRN: [id number]  [place] 1990  Date of evaluation: 9/2/21  PCP:  No primary care provider on file. CHIEF COMPLAINT       Chief Complaint   Patient presents with    Chest Pain    Laceration     right leg       HISTORY OFPRESENT ILLNESS  (Location/Symptom, Timing/Onset, Context/Setting, Quality, Duration, Modifying Mary Ware.)      Alison Armenta is a 27 y.o. female who presents with complaint of chest pain and shortness of breath. She is known Covid positive with recent admission from 8/22-8/28 for Covid pneumonia. Says that she has not quite returned to baseline and is still having some residual shortness of breath, though she has a pulse oximeter at home and is remaining in the mid 90s at all times. Chest pain occurs with coughing. Also complaining of a small laceration to the right lower extremity that is weeping clear fluid. She denies any recent leg swelling, exogenous estrogen use, tobacco use, fevers, chills, abdominal pain, nausea, vomiting. PAST MEDICAL / SURGICAL / SOCIAL / FAMILY HISTORY      has a past medical history of Depression. has a past surgical history that includes [place] tooth extraction (Left, 2014). Social:  reports that she has been smoking cigars. She started smoking about 18 years ago. She has never used smokeless tobacco. She reports current alcohol use. She reports previous drug use. Drug: Marijuana. Family Hx:   Family History   Problem Relation Age of Onset    Diabetes Mother         Allergies:  Patient has no known allergies. Home Medications:  Prior to Admission medications    Medication Sig Start Date End Date Taking?  Authorizing Provider   lisinopril (PRINIVIL;ZESTRIL) 10 MG tablet Take 1 tablet by mouth daily 8/28/21   Dung Nathan MD   ibuprofen (ADVIL;MOTRIN) 600 MG tablet Take 1 tablet by mouth every 8 hours as needed for Pain or Fever 8/27/21 9/26/21  Amy Jovel MD   dexamethasone (DECADRON) 6 MG tablet Take 1 tablet by mouth daily for 5 doses 8/28/21 9/2/21  Amy Jovel MD   albuterol sulfate HFA (VENTOLIN HFA) 108 (90 Base) MCG/ACT inhaler Inhale 2 puffs into the lungs 4 times daily as needed for Wheezing 8/21/21   Gogo Nolan DO   norethindrone-ethinyl estradiol-Fe (LO LOESTRIN FE) 1 MG-10 MCG / 10 MCG tablet Take 1 tablet by mouth daily 7/1/21   Tc De La O DO       REVIEW OFSYSTEMS    (2-9 systems for level 4, 10 or more for level 5)      Review of Systems   Constitutional: Negative for appetite change, chills, fatigue and fever. HENT: Negative for congestion, rhinorrhea, sneezing and sore throat. Eyes: Negative for visual disturbance. Respiratory: Positive for shortness of breath. Negative for cough. Cardiovascular: Positive for chest pain. Negative for leg swelling. Gastrointestinal: Negative for abdominal pain, diarrhea, nausea and vomiting. Genitourinary: Negative for dysuria. Musculoskeletal: Negative for myalgias, neck pain and neck stiffness. Skin: Negative for rash and wound. Neurological: Negative for dizziness, syncope, light-headedness and headaches. Psychiatric/Behavioral: Negative for dysphoric mood and suicidal ideas. PHYSICAL EXAM   (up to 7 for level 4, 8 or more forlevel 5)      INITIAL VITALS:   Vitals:    09/02/21 1331   BP:    Pulse: 82   Resp:    Temp:    SpO2:     /84   Pulse 82   Temp 98.6 °F (37 °C)   Resp 18   Ht 5' 6\" (1.676 m)   Wt (!) 430 lb (195 kg)   SpO2 99%   BMI 69.40 kg/m²       Physical Exam  Vitals and nursing note reviewed. Constitutional:       General: She is not in acute distress. Appearance: Normal appearance. She is obese. She is not ill-appearing, toxic-appearing or diaphoretic. HENT:      Head: Normocephalic.       Nose: Nose normal.      Mouth/Throat:      Mouth: Mucous membranes are moist. Pharynx: Oropharynx is clear. Eyes:      Extraocular Movements: Extraocular movements intact. Conjunctiva/sclera: Conjunctivae normal.      Pupils: Pupils are equal, round, and reactive to light. Cardiovascular:      Rate and Rhythm: Normal rate and regular rhythm. Pulses: Normal pulses. Heart sounds: Normal heart sounds. Pulmonary:      Effort: Pulmonary effort is normal. No respiratory distress. Breath sounds: Normal breath sounds. No wheezing or rales. Chest:      Chest wall: No tenderness. Abdominal:      General: There is no distension. Palpations: Abdomen is soft. Tenderness: There is no abdominal tenderness. There is no guarding or rebound. Musculoskeletal:         General: Normal range of motion. Cervical back: Normal range of motion and neck supple. Skin:     General: Skin is warm. Capillary Refill: Capillary refill takes less than 2 seconds. Comments: Very superficial abrasion to right lower extremity, weeping clear fluid   Neurological:      General: No focal deficit present. Mental Status: She is alert and oriented to person, place, and time. Psychiatric:         Mood and Affect: Mood normal.         Behavior: Behavior normal.         DIFFERENTIAL  DIAGNOSIS     Initial MDM/Plan: 27 y.o. female who presents with chest pain with coughing, shortness of breath in the context of recent Covid diagnosis and admission. Also complaining of scratch to her right lower extremity that is leaking clear fluid. Vital signs reviewed, she is normotensive, not tachycardic, afebrile, saturating 99% on room air. She is in no apparent distress. Her heart is regular rhythm with no murmurs rubs or gallops. Lungs clear to auscultation bilaterally. 2+ radial and posterior tibial pulses. She does have trace lower extremity swelling which she says is her baseline.   She has a very small superficial scratch to the right lower extremity that is weeping clear fluid. No surrounding erythema, purulence, fluctuance or cellulitic changes. Plan to obtain EKG, chest x-ray, and troponin. DIAGNOSTIC RESULTS / EMERGENCYDEPARTMENT COURSE / MDM     LABS:  Labs Reviewed   TROPONIN         RADIOLOGY:  XR CHEST PORTABLE    Result Date: 9/2/2021  EXAMINATION: ONE XRAY VIEW OF THE CHEST 9/2/2021 1:47 pm COMPARISON: 08/27/2021, 08/22/2021 HISTORY: ORDERING SYSTEM PROVIDED HISTORY: CP TECHNOLOGIST PROVIDED HISTORY: CP FINDINGS: Shallow inflation. The cardiomediastinal silhouette is unchanged in appearance. Recently described opacities in the upper lung fields have improved. No new airspace disease or pneumothorax. No effusion is appreciated. The osseous structures are unchanged in appearance. Shallow inflation without acute airspace disease identified.      EKG  EKG Interpretation    Interpreted by emergency department physician    Rhythm: normal sinus   Rate: normal  Axis: normal  Ectopy: premature atrial contraction  Conduction: normal  ST Segments: no acute change  T Waves: normal  Q Waves: none    Clinical Impression: non-specific EKG    Hank Larkin MD      All EKG's are interpreted by the Emergency Department Physicianwho either signs or Co-signs this chart in the absence of a cardiologist.    PROCEDURES:  None    CONSULTS:  None      FINAL IMPRESSION      1. COVID-19        DISPOSITION / PLAN     DISPOSITION Decision To Discharge 09/02/2021 03:52:52 PM      PATIENT REFERRED TO:  [facility]  [address] [place] [id number]  Go to   If symptoms worsen      DISCHARGE MEDICATIONS:  Discharge Medication List as of 9/2/2021  3:53 PM          Hank Larkin MD  Emergency Medicine Resident    (Please note that portions of this note were completed with a voice recognition program.Efforts were made to edit the dictations but occasionally words are mis-transcribed.)        Hank Larkin MD  Resident  09/02/21 1949

## 2021-09-02 NOTE — ED PROVIDER NOTES
[facility]     Emergency Department     Faculty Attestation    I performed a history and physical examination of the patient and discussed management with the resident. I reviewed the residents note and agree with the documented findings and plan of care. Any areas of disagreement are noted on the chart. I was personally present for the key portions of any procedures. I have documented in the chart those procedures where I was not present during the key portions. I have reviewed the emergency nurses triage note. I agree with the chief complaint, past medical history, past surgical history, allergies, medications, social and family history as documented unless otherwise noted below. For Physician Assistant/ Nurse Practitioner cases/documentation I have personally evaluated this patient and have completed at least one if not all key elements of the E/M (history, physical exam, and MDM). Additional findings are as noted. Primary Care Physician:  No primary care provider on file.     CHIEF COMPLAINT       Chief Complaint   Patient presents with    Chest Pain    Laceration     right leg       RECENT VITALS:   Temp: 98.6 °F (37 °C),  Pulse: 82, Resp: 18, BP: 136/84    LABS:  Labs Reviewed   TROPONIN   EKG normal sinus rhythm with sinus arrhythmia rate of 85 bpm WA interval is 126 ms QS durations 80 ms QT corrected 416 ms axis is forty-four there is no acute ST or T wave changes seen      PERTINENT ATTENDING PHYSICIAN COMMENTS:    She is here with complaints of chest pain and some shortness of breath patient has a history of Covid recently discharged from the hospital her oxygen saturations been running above ninety she has a superficial scratch to the right leg that is leaking fluid most likely secondary to her trace peripheral edema we will check an EKG and some labs she is in no acute distress    Critical Care          Shannon Perdomo MD,  MD, [facility]  Attending Emergency Physician              Yissel Jenkins MD  09/02/21 3430       Yissel Jenkins MD  09/02/21 5087

## 2021-09-03 ENCOUNTER — CARE COORDINATION (OUTPATIENT)
Dept: CARE COORDINATION | Age: 31
End: 2021-09-03

## 2021-09-03 ENCOUNTER — CARE COORDINATION (OUTPATIENT)
Dept: CASE MANAGEMENT | Age: 31
End: 2021-09-03

## 2021-09-03 LAB
EKG ATRIAL RATE: 85 BPM
EKG P AXIS: 60 DEGREES
EKG P-R INTERVAL: 136 MS
EKG Q-T INTERVAL: 350 MS
EKG QRS DURATION: 80 MS
EKG QTC CALCULATION (BAZETT): 416 MS
EKG R AXIS: 44 DEGREES
EKG T AXIS: 41 DEGREES
EKG VENTRICULAR RATE: 85 BPM

## 2021-09-03 NOTE — CARE COORDINATION
Patient contacted regarding COVID-19 risk, exposure, pulse oximeter ordered at discharge and monoclonal antibody infusion follow up. Discussed COVID-19 related testing which was available at this time. Test results were positive. Patient informed of results, if available? Yes. Ambulatory Care Manager contacted the patient by telephone to perform post discharge assessment. Call within 2 business days of discharge: Yes. Verified name and  with patient as identifiers. Provided introduction to self, and explanation of the CTN/ACM role, and reason for call due to risk factors for infection and/or exposure to COVID-19. Symptoms reviewed with patient who verbalized the following symptoms: no new symptoms and no worsening symptoms. Due to no new or worsening symptoms encounter was not routed to provider for escalation. Discussed follow-up appointments. If no appointment was previously scheduled, appointment scheduling offered: Yes. [facility] follow up appointment(s):   Future Appointments   Date Time Provider Karlo Corea   10/4/2021 10:00 AM SCHEDULE, ULTRASOUND [facility] [address] [facility]       Non-face-to-face services provided:  Obtained and reviewed discharge summary and/or continuity of care documents     Advance Care Planning:   Does patient have an Advance Directive:  reviewed and current. Educated patient about risk for severe COVID-19 due to risk factors according to CDC guidelines. ACM reviewed discharge instructions, medical action plan and red flag symptoms with the patient who verbalized understanding. Discussed COVID vaccination status: Yes. Education provided on COVID-19 vaccination as appropriate. Discussed exposure protocols and quarantine with CDC Guidelines. Patient was given an opportunity to verbalize any questions and concerns and agrees to contact ACM or health care provider for questions related to their healthcare.     Reviewed and educated patient on any new and changed medications related to discharge diagnosis     Was patient discharged with a pulse oximeter? Yes Discussed and confirmed pulse oximeter discharge instructions and when to notify provider or seek emergency care. ACM provided contact information. No further follow-up call identified based on severity of symptoms and risk factors. Patient reports feeling better, no new symptoms.

## 2021-09-07 ENCOUNTER — CARE COORDINATION (OUTPATIENT)
Dept: CASE MANAGEMENT | Age: 31
End: 2021-09-07

## 2021-09-07 NOTE — CARE COORDINATION
[facility] Transitions Follow Up Call- COVID risk follow up call       2021    Patient: Roman Houston  Patient : 1990   MRN: [id number]  Reason for Admission: COVID-19 pneumonia, hypoxia, sepsis   Discharge Date: 21 RARS: Readmission Risk Score: 12         Spoke with: Kayleen Arreola     Call to p who states she is \"hanging in there\"  States no longer getting short of breath when going up stairs  States she is coughing up some white sputum every now and then  States eating and drinking well  Denies needs  Encouraged to call writer/ CTC or providers if questions or concerns- v/u     Care Transitions Subsequent and Final Call    Subsequent and Final Calls  Do you have any ongoing symptoms?: No  Have your medications changed?: No  Do you have any questions related to your medications?: No  Do you currently have any active services?: No  Do you have any needs or concerns that I can assist you with?: No  Identified Barriers: Lack of Education  Care Transitions Interventions  Other Interventions:            Follow Up  Future Appointments   Date Time Provider Karlo Corea   10/4/2021 10:00 AM SCHEDULE, ULTRASOUND [facility] [address] [facility] OB/Gyn Gracie Calderon RN

## 2021-09-16 ENCOUNTER — VIRTUAL VISIT (OUTPATIENT)
Dept: INTERNAL MEDICINE CLINIC | Age: 31
End: 2021-09-16
Payer: COMMERCIAL

## 2021-09-16 DIAGNOSIS — R30.0 DYSURIA: ICD-10-CM

## 2021-09-16 DIAGNOSIS — Z13.220 LIPID SCREENING: ICD-10-CM

## 2021-09-16 DIAGNOSIS — R74.8 ELEVATED LIVER ENZYMES: ICD-10-CM

## 2021-09-16 DIAGNOSIS — Z11.59 NEED FOR HEPATITIS C SCREENING TEST: ICD-10-CM

## 2021-09-16 DIAGNOSIS — Z11.4 ENCOUNTER FOR SCREENING FOR HIV: ICD-10-CM

## 2021-09-16 DIAGNOSIS — R65.20 SEPSIS WITH ACUTE HYPOXIC RESPIRATORY FAILURE WITHOUT SEPTIC SHOCK, DUE TO UNSPECIFIED ORGANISM (HCC): ICD-10-CM

## 2021-09-16 DIAGNOSIS — F90.9 ATTENTION DEFICIT HYPERACTIVITY DISORDER (ADHD), UNSPECIFIED ADHD TYPE: ICD-10-CM

## 2021-09-16 DIAGNOSIS — R06.83 SNORING: ICD-10-CM

## 2021-09-16 DIAGNOSIS — D64.9 ANEMIA, UNSPECIFIED TYPE: ICD-10-CM

## 2021-09-16 DIAGNOSIS — E66.01 MORBID OBESITY (HCC): ICD-10-CM

## 2021-09-16 DIAGNOSIS — J96.01 SEPSIS WITH ACUTE HYPOXIC RESPIRATORY FAILURE WITHOUT SEPTIC SHOCK, DUE TO UNSPECIFIED ORGANISM (HCC): ICD-10-CM

## 2021-09-16 DIAGNOSIS — Z76.89 ENCOUNTER TO ESTABLISH CARE: Primary | ICD-10-CM

## 2021-09-16 DIAGNOSIS — A41.9 SEPSIS WITH ACUTE HYPOXIC RESPIRATORY FAILURE WITHOUT SEPTIC SHOCK, DUE TO UNSPECIFIED ORGANISM (HCC): ICD-10-CM

## 2021-09-16 DIAGNOSIS — R53.83 FATIGUE, UNSPECIFIED TYPE: ICD-10-CM

## 2021-09-16 DIAGNOSIS — R06.81 WITNESSED EPISODE OF APNEA: ICD-10-CM

## 2021-09-16 PROCEDURE — G8427 DOCREV CUR MEDS BY ELIG CLIN: HCPCS | Performed by: NURSE PRACTITIONER

## 2021-09-16 PROCEDURE — 1111F DSCHRG MED/CURRENT MED MERGE: CPT | Performed by: NURSE PRACTITIONER

## 2021-09-16 PROCEDURE — 99213 OFFICE O/P EST LOW 20 MIN: CPT | Performed by: NURSE PRACTITIONER

## 2021-09-16 SDOH — ECONOMIC STABILITY: TRANSPORTATION INSECURITY
IN THE PAST 12 MONTHS, HAS THE LACK OF TRANSPORTATION KEPT YOU FROM MEDICAL APPOINTMENTS OR FROM GETTING MEDICATIONS?: NO

## 2021-09-16 SDOH — ECONOMIC STABILITY: TRANSPORTATION INSECURITY
IN THE PAST 12 MONTHS, HAS LACK OF TRANSPORTATION KEPT YOU FROM MEETINGS, WORK, OR FROM GETTING THINGS NEEDED FOR DAILY LIVING?: NO

## 2021-09-16 SDOH — ECONOMIC STABILITY: FOOD INSECURITY: WITHIN THE PAST 12 MONTHS, YOU WORRIED THAT YOUR FOOD WOULD RUN OUT BEFORE YOU GOT MONEY TO BUY MORE.: NEVER TRUE

## 2021-09-16 SDOH — ECONOMIC STABILITY: FOOD INSECURITY: WITHIN THE PAST 12 MONTHS, THE FOOD YOU BOUGHT JUST DIDN'T LAST AND YOU DIDN'T HAVE MONEY TO GET MORE.: NEVER TRUE

## 2021-09-16 ASSESSMENT — ENCOUNTER SYMPTOMS
CONSTIPATION: 1
WHEEZING: 0
SORE THROAT: 0
ABDOMINAL PAIN: 0
RHINORRHEA: 0
BACK PAIN: 0
NAUSEA: 0
COUGH: 1
EYE PAIN: 0
SHORTNESS OF BREATH: 1

## 2021-09-16 ASSESSMENT — SOCIAL DETERMINANTS OF HEALTH (SDOH): HOW HARD IS IT FOR YOU TO PAY FOR THE VERY BASICS LIKE FOOD, HOUSING, MEDICAL CARE, AND HEATING?: NOT HARD AT ALL

## 2021-09-16 NOTE — PROGRESS NOTES
Visit Information    Have you changed or started any medications since your last visit including any over-the-counter medicines, vitamins, or herbal medicines? no   Are you having any side effects from any of your medications? -  no  Have you stopped taking any of your medications? Is so, why? -  no    Have you seen any other physician or provider since your last visit? Yes - Records Obtained  Have you had any other diagnostic tests since your last visit? Yes - Records Obtained  Have you been seen in the emergency room and/or had an admission to a hospital since we last saw you? Yes - Records Obtained  Have you had your routine dental cleaning in the past 6 months? no    Have you activated your Typeform account? If not, what are your barriers? Yes     Patient Care Team:  Michael Slade RN as Care Transitions Nurse    Medical History Review  Past Medical, Family, and Social History reviewed and does contribute to the patient presenting condition    Health Maintenance   Topic Date Due    Hepatitis C screen  Never done    Varicella vaccine (2 of 2 - 2-dose childhood series) 1993    COVID-19 Vaccine (1) Never done    HIV screen  Never done    Flu vaccine (1) 2021    Potassium monitoring  2022    Creatinine monitoring  2022    DTaP/Tdap/Td vaccine (5 - Td or Tdap) 2026    Cervical cancer screen  2026    Pneumococcal 0-64 years Vaccine (2 of 2 - PPSV23) 2054    Hib vaccine  Completed    Hepatitis A vaccine  Aged Out    Hepatitis B vaccine  Aged Out    Meningococcal (ACWY) vaccine  Aged Out     2021    TELEHEALTH EVALUATION -- Audio/Visual (During [place] [address] public health emergency)    HPI:    Dixon Miguel (:  1990) has requested an audio/video evaluation for the following concern(s): To establish care. She was recently in hospital with Covid infection, pneumonia, sepsis, symptoms are slowly improving. Hospital notes, imaging, labs reviewed. Occasionally     Drug use: Not Currently     Types: Marijuana        No Known Allergies,   Past Medical History:   Diagnosis Date    Class 3 severe obesity due to excess calories without serious comorbidity with body mass index (BMI) of 60.0 to 69.9 in adult [facility])     COVID-19 virus infection     Depression 2017    Essential hypertension    ,   Past Surgical History:   Procedure Laterality Date    WISDOM TOOTH EXTRACTION Left    ,   Social History     Tobacco Use    Smoking status: Former Smoker     Types: Cigars     Start date:      Quit date: 2021     Years since quittin.0    Smokeless tobacco: Never Used    Tobacco comment: 1-2 black and mild   Vaping Use    Vaping Use: Never used   Substance Use Topics    Alcohol use: Yes     Comment: Occasionally     Drug use: Not Currently     Types: Marijuana   ,   Family History   Problem Relation Age of Onset    Diabetes Mother        PHYSICAL EXAMINATION:  Patient was not physically examined as this is a virtual visit. Physical findings limited and were observed by video. [ INSTRUCTIONS:  \"[x]\" Indicates a positive item  \"[]\" Indicates a negative item  -- DELETE ALL ITEMS NOT EXAMINED]  Vital Signs: (As obtained by patient/caregiver or practitioner observation)    No flowsheet data found. Constitutional: [x] Appears well-developed and well-nourished [] No apparent distress      [x] Abnormal-obese  Mental status  [x] Alert and awake  [] Oriented to person/place/time [x]Able to follow commands      Eyes:  EOM    []  Normal  [] Abnormal-  Sclera  [x]  Normal  [] Abnormal -         Discharge [x]  None visible  [] Abnormal -    HENT:   [x] Normocephalic, atraumatic.   [] Abnormal   [] Mouth/Throat: Mucous membranes are moist.     External Ears [x] Normal  [] Abnormal-     Neck: [x] No visualized mass     Pulmonary/Chest: [] Respiratory effort normal.  [x] No visualized signs of difficulty breathing or respiratory distress        [] Abnormal-      Musculoskeletal:   [] Normal gait with no signs of ataxia         [x] Normal range of motion of neck        [] Abnormal-       Neurological:        [x] No Facial Asymmetry (Cranial nerve 7 motor function) (limited exam to video visit)          [] No gaze palsy        [] Abnormal-         Skin:        [x] No significant exanthematous lesions or discoloration noted on facial skin         [] Abnormal-            Psychiatric:       [x] Normal Affect [] No Hallucinations        [] Abnormal-     Other pertinent observable physical exam findings-     ASSESSMENT/PLAN:  Visit Diagnoses and Associated Orders     Encounter to establish care    -  Primary    Varicella Zoster Antibody, IgG [[id number] Custom]   - Future Order         Sepsis with acute hypoxic respiratory failure without septic shock, due to unspecified organism ([facility]        S/P hospital stay, symptoms improved         Attention deficit hyperactivity disorder (ADHD), unspecified ADHD type        History of ADHD, not on medication         Morbid obesity ([facility]        Advised on healthy diet, regular exercise as tolerated         Snoring        Baseline Diagnostic Sleep Study [[id number] Custom]   - Future Order    Sleep Study with PAP Titration [[id number] Custom]   - Future Order         Witnessed episode of apnea        Baseline Diagnostic Sleep Study [[id number] Custom]   - Future Order    Sleep Study with PAP Titration [[id number] Custom]   - Future Order         Anemia, unspecified type        ? secondary to menorrhagia, repeat CBC    CBC Auto Differential [[id number] Custom]   - Future Order         Encounter for screening for HIV        HIV Screen [[id number] Custom]   - Future Order         Need for hepatitis C screening test        Hepatitis C Antibody [[id number] Custom]   - Future Order         Lipid screening        Lipid Panel [[id number] Custom]   - Future Order         Fatigue, unspecified type        TSH with Reflex [[id number] Custom]   - Future Order         Dysuria Urinalysis Reflex to Culture [[id number] Custom]   - Future Order         Elevated liver enzymes                   Return in about 1 month (around 10/16/2021) for routine follow up, or sooner if needed. Health Maintenance reviewed - will check varicella titer, screenings. Patient to schedule in office appointment. Gabriela Valladares is a 27 y.o. female being evaluated by a Virtual Visit (video visit) encounter to address concerns as mentioned above. A caregiver was present when appropriate. Due to this being a TeleHealth encounter (During [facility]-19 public health emergency), evaluation of the following organ systems was limited: Vitals/Constitutional/EENT/Resp/CV/GI//MS/Neuro/Skin/Heme-Lymph-Imm. Pursuant to the emergency declaration under the [address] [place] [address], [address] [place] [address] authority and the [employer] Act, this Virtual Visit was conducted with patient's (and/or legal guardian's) consent, to reduce the patient's risk of exposure to COVID-19 and provide necessary medical care. The patient (and/or legal guardian) has also been advised to contact this office for worsening conditions or problems, and seek emergency medical treatment and/or call 911 if deemed necessary. Patient identification was verified at the start of the visit: Yes    Total time spent on this encounter: 30 minutes    Services were provided through a video synchronous discussion virtually to substitute for in-person clinic visit. Patient and provider were located at their individual homes. --Eun Tyler, SHEILA - CNP on 9/16/2021 at 5:31 PM    An electronic signature was used to authenticate this note.

## 2021-09-22 ENCOUNTER — CARE COORDINATION (OUTPATIENT)
Dept: CASE MANAGEMENT | Age: 31
End: 2021-09-22

## 2021-09-22 NOTE — CARE COORDINATION
[facility] Transitions Follow Up Call    2021    Patient: Collette Parkinson  Patient : 1990   MRN: [id number]  Reason for Admission: (+) COVID 19  Discharge Date: 21 RARS: Readmission Risk Score: 12         Spoke with: [facility] Transitions Subsequent and Final Call    Subsequent and Final Calls  Do you have any ongoing symptoms?: Yes  Onset of Patient-reported symptoms: Other  Patient-reported symptoms: Cough, Shortness of Breath, Weakness, Fatigue, Other  Have your medications changed?: No  Do you have any questions related to your medications?: No  Care Transitions Interventions  Other Interventions:       States feeling a little stronger, states her chest hurts, feels tight. Instructed most likely due to the frequent cough. Instructed to request an appointment for follow up so that her doctor can assess. V/U  States her daughter has COVID as well. She is doing OK. Care Transitions Follow Up Call    Needs to be reviewed by the provider   Additional needs identified to be addressed with provider: No  none             Method of communication with provider : phone      Care Transition Nurse (CTN) contacted the patient by telephone to follow up after admission. Verified name and  with patient as identifiers. Addressed changes since last contact: none  Discussed follow-up appointments. If no appointment was previously scheduled, appointment scheduling offered: completed 21. Is follow up appointment scheduled within 7 days of discharge? No.    Advance Care Planning:   Does patient have an Advance Directive: reviewed and current, reviewed and needs to be updated, not on file; education provided, not on file, patient declined education, decision maker updated and referral to internal ACP facilitator. CTN reviewed discharge instructions, medical action plan and red flags with patient and discussed any barriers to care and/or understanding of plan of care after discharge. Discussed appropriate site of care based on symptoms and resources available to patient including: PCP. The patient agrees to contact the PCP office for questions related to their healthcare. Patients top risk factors for readmission: medical condition-(+) COVID  Interventions to address risk factors: Education of patient/family/caregiver/guardian to support self-management-f/u appointment needed for lung assessment    Follow Up:  Cough, chest tightness, PCP appointment scheduled.   Future Appointments   Date Time Provider Karlo Corea   10/4/2021 10:00 AM [facility]EDULE[facility] [address] OB/Gyn [facility]   10/11/2021  3:15 PM [facility]   10/19/2021  1:15 PM SHEILA Joy [address] [facility]       Carlos A Wisdom RN

## 2021-09-27 ENCOUNTER — CARE COORDINATION (OUTPATIENT)
Dept: CASE MANAGEMENT | Age: 31
End: 2021-09-27

## 2021-09-27 NOTE — CARE COORDINATION
[facility] Transitions Follow Up Call- final COVID risk follow up call       2021    Patient: Umair Gutierres  Patient : 1990   MRN: [id number]  Reason for Admission: COVID-19 pneumonia, hypoxia, sepsis    Discharge Date: 21 RARS: Readmission Risk Score: 12         Spoke with: Juanita Hawkins     Call to pt who states she is doing better  States breathing is good but coughing up phlegm- states she is taking Robitussin DM which helps  States she uses albuterol inhaler once a day or every other day  States she is supposed to have a sleep study and will be scheduling this soon   States eating and drinking well     You Patient resolved from the Care Transitions episode on 2021  Discussed COVID-19 related testing which was available at this time. Test results were positive. Patient informed of results, if available? Yes    Patient/family has been provided the following resources and education related to COVID-19:                         Signs, symptoms and red flags related to COVID-19            CDC exposure and quarantine guidelines            [employer] exposure contact - [phone]            Contact for their local Department of Health                 Patient currently reports that the following symptoms have improved:  fatigue, cough, shortness of breath and no new/worsening symptoms     No further outreach scheduled with this CTN/ACM. Episode of Care resolved. Patient has this CTN/ACM contact information if future needs arise. Care Transitions Subsequent and Final Call    Subsequent and Final Calls  Do you have any ongoing symptoms?: No  Have your medications changed?: No  Do you have any questions related to your medications?: No  Do you currently have any active services?: No  Do you have any needs or concerns that I can assist you with?: No  Identified Barriers: Lack of Education  Care Transitions Interventions  Other Interventions:            Follow Up  Future Appointments   Date Time Provider Karlo Corea   10/4/2021 10:00 AM SCHEDULE, ULTRASOUND [facility] [facility]   10/11/2021  3:15 PM Conner Pete MD INFT DISEASE [facility]   10/19/2021  1:15 PM SHEILA Hartmann - CNP [facility] Maria Teresa Nuñez RN

## 2021-10-19 ENCOUNTER — TELEPHONE (OUTPATIENT)
Dept: INTERNAL MEDICINE CLINIC | Age: 31
End: 2021-10-19

## 2021-10-19 NOTE — TELEPHONE ENCOUNTER
Writer spoke w/ pt bu phone regarding her no show appt camilla'd w/ Maroln Lew on 10/19/21. Pt stated that she forgot about appt. Appt rupa'd & ltr mailed.

## 2021-10-19 NOTE — LETTER
Riky Hagen  [address] [place] [id number]          October 19, 2021     Dear Riky Hagen: You recently missed a scheduled appointment with Xuan White CNP on 10/19/21. This is the 1st scheduledappointment that you have missed within the last twelve months. If you miss 2 more appointment, you may be dismissed from the practice. It is your responsibility to arrive to your appointment. Please call our office as soon as possible so that we may reschedule your appointment, because your health and follow-up medical care are important to us. For future appointments that you are unable to keep, please call the office at [phone] at least 24 hours in advance to cancel and reschedule. If a traditional appointment is difficult for you to make, please keep in mind, we offer E-Visits for several diagnoses as well as virtual visits. We also have primary care walk-in clinics available. For more information on these options, please contact our office.    Sincerely,        [address] Bekah Perrin

## 2021-10-28 ENCOUNTER — OFFICE VISIT (OUTPATIENT)
Dept: INTERNAL MEDICINE CLINIC | Age: 31
End: 2021-10-28
Payer: COMMERCIAL

## 2021-10-28 VITALS
HEIGHT: 66 IN | OXYGEN SATURATION: 98 % | DIASTOLIC BLOOD PRESSURE: 84 MMHG | HEART RATE: 84 BPM | SYSTOLIC BLOOD PRESSURE: 136 MMHG | WEIGHT: 293 LBS | BODY MASS INDEX: 47.09 KG/M2

## 2021-10-28 DIAGNOSIS — A41.9 SEPSIS WITH ACUTE HYPOXIC RESPIRATORY FAILURE WITHOUT SEPTIC SHOCK, DUE TO UNSPECIFIED ORGANISM (HCC): ICD-10-CM

## 2021-10-28 DIAGNOSIS — E66.01 MORBID OBESITY (HCC): Primary | ICD-10-CM

## 2021-10-28 DIAGNOSIS — R60.0 BILATERAL LOWER EXTREMITY EDEMA: ICD-10-CM

## 2021-10-28 DIAGNOSIS — J96.01 SEPSIS WITH ACUTE HYPOXIC RESPIRATORY FAILURE WITHOUT SEPTIC SHOCK, DUE TO UNSPECIFIED ORGANISM (HCC): ICD-10-CM

## 2021-10-28 DIAGNOSIS — I10 HYPERTENSION GOAL BP (BLOOD PRESSURE) < 130/80: ICD-10-CM

## 2021-10-28 DIAGNOSIS — R65.20 SEPSIS WITH ACUTE HYPOXIC RESPIRATORY FAILURE WITHOUT SEPTIC SHOCK, DUE TO UNSPECIFIED ORGANISM (HCC): ICD-10-CM

## 2021-10-28 PROCEDURE — G8417 CALC BMI ABV UP PARAM F/U: HCPCS | Performed by: NURSE PRACTITIONER

## 2021-10-28 PROCEDURE — 99213 OFFICE O/P EST LOW 20 MIN: CPT | Performed by: NURSE PRACTITIONER

## 2021-10-28 PROCEDURE — G8484 FLU IMMUNIZE NO ADMIN: HCPCS | Performed by: NURSE PRACTITIONER

## 2021-10-28 PROCEDURE — 1036F TOBACCO NON-USER: CPT | Performed by: NURSE PRACTITIONER

## 2021-10-28 PROCEDURE — G8427 DOCREV CUR MEDS BY ELIG CLIN: HCPCS | Performed by: NURSE PRACTITIONER

## 2021-10-28 ASSESSMENT — ENCOUNTER SYMPTOMS
ABDOMINAL PAIN: 0
SHORTNESS OF BREATH: 1
CONSTIPATION: 0
NAUSEA: 0
COUGH: 0
DIARRHEA: 0
WHEEZING: 0

## 2021-10-28 NOTE — PATIENT INSTRUCTIONS
sugar-sweetened drinks like soda. The Mediterranean diet may also include red wine with your meal1 glass each day for women and up to 2 glasses a day for men. Tips for eating at home  · Use herbs, spices, garlic, lemon zest, and citrus juice instead of salt to add flavor to foods. · Add avocado slices to your sandwich instead of scott. · Have fish for lunch or dinner instead of red meat. Brush the fish with olive oil, and broil or grill it. · Sprinkle your salad with seeds or nuts instead of cheese. · Cook with olive or canola oil instead of butter or oils that are high in saturated fat. · Switch from 2% milk or whole milk to 1% or fat-free milk. · Dip raw vegetables in a vinaigrette dressing or hummus instead of dips made from mayonnaise or sour cream.  · Have a piece of fruit for dessert instead of a piece of cake. Try baked apples, or have some dried fruit. Tips for eating out  · Try broiled, grilled, baked, or poached fish instead of having it fried or breaded. · Ask your  to have your meals prepared with olive oil instead of butter. · Order dishes made with marinara sauce or sauces made from olive oil. Avoid sauces made from cream or mayonnaise. · Choose whole-grain breads, whole wheat pasta and pizza crust, brown rice, beans, and lentils. · Cut back on butter or margarine on bread. Instead, you can dip your bread in a small amount of olive oil. · Ask for a side salad or grilled vegetables instead of french fries or chips. Where can you learn more? Go to https://[employer] Solutionstoddeweb.[employer]. org and sign in to your [employer] account. Enter [phone] in the [facility] box to learn more about \"Learning About the Mediterranean Diet. \"     If you do not have an account, please click on the \"Sign Up Now\" link. Current as of: December 17, 2020               Content Version: 13.0  © 9587-7670 Healthwise, Incorporated. Care instructions adapted under license by [facility] ([facility]).  If you have questions about a medical condition or this instruction, always ask your healthcare professional. [place] [address] any warranty or liability for your use of this information.

## 2021-10-28 NOTE — PROGRESS NOTES
Visit Information    Have you changed or started any medications since your last visit including any over-the-counter medicines, vitamins, or herbal medicines? no   Are you having any side effects from any of your medications? -  no  Have you stopped taking any of your medications? Is so, why? -  no    Have you seen any other physician or provider since your last visit? No  Have you had any other diagnostic tests since your last visit? No  Have you been seen in the emergency room and/or had an admission to a hospital since we last saw you? Yes - Records Obtained  Have you had your routine dental cleaning in the past 6 months? yes -     Have you activated your Moglue account? If not, what are your barriers?  Yes     Patient Care Team:  SHEILA Lara CNP as PCP - General (Internal Medicine)  SHEILA Lara CNP as PCP - [facility]    Medical History Review  Past Medical, Family, and Social History reviewed and does contribute to the patient presenting condition    Health Maintenance   Topic Date Due    Hepatitis C screen  Never done    Varicella vaccine (2 of 2 - 2-dose childhood series) 08/12/1993    COVID-19 Vaccine (1) Never done    HIV screen  Never done    Flu vaccine (1) 09/01/2021    Potassium monitoring  08/23/2022    Creatinine monitoring  08/23/2022    DTaP/Tdap/Td vaccine (5 - Td or Tdap) 03/01/2026    Cervical cancer screen  07/01/2026    Hib vaccine  Completed    Hepatitis A vaccine  Aged Out    Hepatitis B vaccine  Aged Out    Meningococcal (ACWY) vaccine  Aged Out    Pneumococcal 0-64 years Vaccine  Aged Out         [address] [address]  Dept: [phone]  Dept Fax: [phone]    Office Progress/Follow Up Note  Date of patient's visit: 10/28/2021   Patient's Name:  Jj Montalvo  YOB: 1990            Patient Care Team:  SHEILA Lara CNP as PCP - General (Internal Medicine)  Toi Camilo, SHEILA - CNP as PCP - [facility]    REASON FOR VISIT: Blood Infection, Hypertension, and Obesity        HISTORY OF PRESENT ILLNESS:      History was obtained from the patient. Jose Castaneda is a 27 y.o. is here for follow up of Blood Infection, Hypertension, and Obesity    Sepsis- has completed antibiotic, has scheduled follow up with ID. HTN- reports compliance with lisinopril, no adverse effects. Diet-trying to eat more salads, does not drink pop  Exercise-none but trying to start with daughter. Patient Active Problem List   Diagnosis    Abnormal uterine bleeding    Mirena IUD    Tobacco abuse    Marijuana abuse    Chronic hypertension (no meds)    Attention deficit hyperactivity disorder (ADHD)    Contraceptive surveillance    Depression    Dysplasia of cervix    Morbid obesity ([facility] [address]    Seasonal allergies    Secondary amenorrhea    Pneumonia due to COVID-19 virus    Iron deficiency anemia due to chronic blood loss    Sepsis with acute hypoxic respiratory failure without septic shock ([facility])    Vitamin D deficiency       No Known Allergies      MEDICATIONS:     Current Outpatient Medications   Medication Sig Dispense Refill    lisinopril (PRINIVIL;ZESTRIL) 10 MG tablet Take 1 tablet by mouth daily 30 tablet 3    albuterol sulfate HFA (VENTOLIN HFA) 108 (90 Base) MCG/ACT inhaler Inhale 2 puffs into the lungs 4 times daily as needed for Wheezing 1 Inhaler 0    norethindrone-ethinyl estradiol-Fe (LO LOESTRIN FE) 1 MG-10 MCG / 10 MCG tablet Take 1 tablet by mouth daily 28 tablet 3     No current facility-administered medications for this visit. SOCIAL HISTORY    Reviewed and updated. [facility]  reports that she quit smoking about 2 months ago. Her smoking use included cigars. She started smoking about 18 years ago. She has never used smokeless tobacco.    FAMILY HISTORY:    Reviewed and updated. family history includes Diabetes in her mother.     REVIEW OF SYSTEMS:      Review of Systems   Constitutional: Positive for fatigue. Negative for chills and fever. Respiratory: Positive for shortness of breath (a little). Negative for cough and wheezing. Cardiovascular: Positive for leg swelling (for years, worse with sitting). Negative for chest pain and palpitations. Gastrointestinal: Negative for abdominal pain, constipation, diarrhea and nausea. Musculoskeletal: Positive for arthralgias (knees). Negative for gait problem. Neurological: Positive for headaches (once in a while). Negative for dizziness. Psychiatric/Behavioral: Positive for sleep disturbance. The patient is not nervous/anxious. PHYSICAL EXAM:      Vitals:    10/28/21 1256   BP: 136/84   Pulse: 84   SpO2: 98%   Weight: (!) 443 lb (200.9 kg)   Height: 5' 6\" (1.676 m)     BP Readings from Last 3 Encounters:   10/28/21 136/84   09/02/21 136/84   08/28/21 (!) 158/101        Physical Exam  Constitutional:       General: She is not in acute distress. Appearance: Normal appearance. She is well-developed. She is obese. HENT:      Head: Normocephalic and atraumatic. Right Ear: Tympanic membrane and external ear normal.      Left Ear: Tympanic membrane and external ear normal.      Nose: Nose normal.      Mouth/Throat:      Mouth: Mucous membranes are moist.      Pharynx: Oropharynx is clear. Eyes:      General:         Right eye: No discharge. Left eye: No discharge. Conjunctiva/sclera: Conjunctivae normal.      Pupils: Pupils are equal, round, and reactive to light. Neck:      Thyroid: No thyromegaly. Cardiovascular:      Rate and Rhythm: Normal rate and regular rhythm. Pulses: Normal pulses. Heart sounds: Heart sounds are distant. Pulmonary:      Effort: Pulmonary effort is normal.      Breath sounds: No wheezing or rhonchi. Abdominal:      General: Bowel sounds are normal. There is no distension. Palpations: Abdomen is soft.       Tenderness: There is no abdominal tenderness. Musculoskeletal:      Cervical back: Normal range of motion and neck supple. No deformity or tenderness. Thoracic back: No deformity or tenderness. Lumbar back: No deformity or tenderness. Normal range of motion. Right lower leg: No edema. Left lower leg: No edema. Lymphadenopathy:      Cervical: No cervical adenopathy. Skin:     General: Skin is warm and dry. Findings: No rash. Neurological:      Mental Status: She is alert and oriented to person, place, and time. Gait: Gait normal.   Psychiatric:         Mood and Affect: Mood normal.         Behavior: Behavior normal.          LABORATORY FINDINGS:    CBC:   Lab Results   Component Value Date    WBC 4.2 08/23/2021    HGB 10.4 08/23/2021     08/23/2021     BMP:   Lab Results   Component Value Date     08/23/2021    K 4.4 08/23/2021     08/23/2021    CO2 22 08/23/2021    BUN 6 08/23/2021    CREATININE 0.73 08/23/2021    GLUCOSE 130 08/23/2021     HEMOGLOBIN A1C:   Lab Results   Component Value Date    LABA1C 5.3 12/14/2018     FASTING LIPID PANEL: No results found for: CHOL, HDL, LDLCHOLESTEROL, TRIG    ASSESSMENT AND PLAN:      Visit Diagnoses and Associated Orders     Morbid obesity ([address]    -  Primary    Motivated to lose weight, advised on healthy diet, exercise and will refer to weight clinic    [facility] [[id number] [employer]]           Hypertension goal BP (blood pressure) < 130/80        Close to goal, continue lisinopril, DASH diet; advised on healthy diet, regular exercise.          Sepsis with acute hypoxic respiratory failure without septic shock, due to unspecified organism [facility])        Feeling much better, has follow up with ID         Bilateral lower extremity edema        No edema today, advised on avoiding salt, elevate legs, compression stockings                FOLLOW UP AND INSTRUCTIONS:     Return in about 2 months (around 12/28/2021) for routine follow up, or sooner if needed. · Eduard Peres received counseling on the following healthy behaviors: nutrition, exercise and medication adherence    · Discussed use, benefit, and side effects of prescribed medications. Barriers to medication compliance addressed. All patient questions answered. Pt voiced understanding. SHEILA Pérez - CNP    11/7/2021, 8:27 PM    Please note that this chart was generated using voice recognition Dragon dictation software. Although every effort was made to ensure the accuracy of this automatedtranscription, some errors in transcription may have occurred.

## 2021-11-09 ENCOUNTER — HOSPITAL ENCOUNTER (EMERGENCY)
Age: 31
Discharge: HOME | End: 2021-11-09
Attending: EMERGENCY MEDICINE
Payer: COMMERCIAL

## 2021-11-09 VITALS
SYSTOLIC BLOOD PRESSURE: 151 MMHG | RESPIRATION RATE: 20 BRPM | OXYGEN SATURATION: 98 % | WEIGHT: 293 LBS | HEIGHT: 66 IN | BODY MASS INDEX: 47.09 KG/M2 | DIASTOLIC BLOOD PRESSURE: 85 MMHG | HEART RATE: 86 BPM | TEMPERATURE: 96.7 F

## 2021-11-09 DIAGNOSIS — J02.9 ACUTE PHARYNGITIS, UNSPECIFIED ETIOLOGY: Primary | ICD-10-CM

## 2021-11-09 DIAGNOSIS — J06.9 ACUTE UPPER RESPIRATORY INFECTION: ICD-10-CM

## 2021-11-09 LAB
DIRECT EXAM: NORMAL
Lab: NORMAL
SARS-COV-2, RAPID: NOT DETECTED
SPECIMEN DESCRIPTION: NORMAL
SPECIMEN DESCRIPTION: NORMAL

## 2021-11-09 PROCEDURE — 87635 SARS-COV-2 COVID-19 AMP PRB: CPT

## 2021-11-09 PROCEDURE — 6370000000 HC RX 637 (ALT 250 FOR IP): Performed by: PHYSICIAN ASSISTANT

## 2021-11-09 PROCEDURE — 87880 STREP A ASSAY W/OPTIC: CPT

## 2021-11-09 PROCEDURE — 99283 EMERGENCY DEPT VISIT LOW MDM: CPT

## 2021-11-09 RX ORDER — IBUPROFEN 600 MG/1
600 TABLET ORAL EVERY 6 HOURS PRN
Qty: 30 TABLET | Refills: 0 | Status: SHIPPED | OUTPATIENT
Start: 2021-11-09 | End: 2022-07-01 | Stop reason: SDUPTHER

## 2021-11-09 RX ORDER — IBUPROFEN 800 MG/1
800 TABLET ORAL ONCE
Status: COMPLETED | OUTPATIENT
Start: 2021-11-09 | End: 2021-11-09

## 2021-11-09 RX ORDER — GUAIFENESIN 600 MG/1
600 TABLET, EXTENDED RELEASE ORAL 2 TIMES DAILY
Qty: 30 TABLET | Refills: 0 | Status: SHIPPED | OUTPATIENT
Start: 2021-11-09 | End: 2021-11-24

## 2021-11-09 RX ADMIN — IBUPROFEN 800 MG: 800 TABLET ORAL at 12:25

## 2021-11-09 ASSESSMENT — PAIN DESCRIPTION - FREQUENCY: FREQUENCY: CONTINUOUS

## 2021-11-09 ASSESSMENT — PAIN DESCRIPTION - DESCRIPTORS: DESCRIPTORS: DISCOMFORT

## 2021-11-09 ASSESSMENT — PAIN SCALES - GENERAL
PAINLEVEL_OUTOF10: 6
PAINLEVEL_OUTOF10: 6

## 2021-11-09 ASSESSMENT — PAIN DESCRIPTION - PAIN TYPE: TYPE: ACUTE PAIN

## 2021-11-09 ASSESSMENT — PAIN DESCRIPTION - LOCATION: LOCATION: THROAT

## 2021-11-09 NOTE — ED NOTES
Discharge instructions reviewed with all questions asked and answered.       Shanelle Garcia RN  11/09/21 5152

## 2021-11-09 NOTE — ED PROVIDER NOTES
[address] ED  eMERGENCY dEPARTMENT eNCOUnter      Pt Name: Mahamed Quigley  MRN: [id number]  [place] 1990  Date of evaluation: 11/9/2021  Provider: Elda Rogers PA-C    CHIEF COMPLAINT       Chief Complaint   Patient presents with    Pharyngitis           HISTORY OF PRESENT ILLNESS  (Location/Symptom, Timing/Onset, Context/Setting, Quality, Duration, Modifying Factors, Severity.)   Mahamed Quigley is a 27 y.o. female who presents to the emergency department for evaluation of sore throat, cough, congestion times several days. Patient states cough is productive with phlegm. Ports throat is very sore and swollen. Patient denies fever, chills, chest pain, shortness breath, nausea, vomiting, abdominal pain. Patients daughters are both sick with similar symptoms. No other complaints. Nursing Notes were reviewed. REVIEW OF SYSTEMS    (2-9 systems for level 4, 10 or more for level 5)     Review of Systems   Sore throat  Cough  Congestion       Except as noted above the remainder of the review of systems was reviewed and negative.        PAST MEDICAL HISTORY     Past Medical History:   Diagnosis Date    Class 3 severe obesity due to excess calories without serious comorbidity with body mass index (BMI) of 60.0 to 69.9 in adult [facility])     COVID-19 virus infection     Depression 05/01/2017    Essential hypertension      None otherwise stated in nurses notes    SURGICAL HISTORY       Past Surgical History:   Procedure Laterality Date    WISDOM TOOTH EXTRACTION Left 2014     None otherwise stated in nurses notes    [address]       Discharge Medication List as of 11/9/2021  1:00 PM      CONTINUE these medications which have NOT CHANGED    Details   lisinopril (PRINIVIL;ZESTRIL) 10 MG tablet Take 1 tablet by mouth daily, Disp-30 tablet, R-3Normal      albuterol sulfate HFA (VENTOLIN HFA) 108 (90 Base) MCG/ACT inhaler Inhale 2 puffs into the lungs 4 times daily as needed for Wheezing, Disp-1 Inhaler, R-0Print      norethindrone-ethinyl estradiol-Fe (LO LOESTRIN FE) 1 MG-10 MCG / 10 MCG tablet Take 1 tablet by mouth daily, Disp-28 tablet, R-3Normal             ALLERGIES     Patient has no known allergies. FAMILY HISTORY           Problem Relation Age of Onset    Diabetes Mother      Family Status   Relation Name Status    Mother  Alive    PGF      PGM  Alive    MGM  Alive    MGF      Father  Alive    Brother  Alive    Sister  Alive      None otherwise stated in nurses notes    SOCIAL HISTORY      reports that she quit smoking about 2 months ago. Her smoking use included cigars. She started smoking about 18 years ago. She has never used smokeless tobacco. She reports current alcohol use. She reports previous drug use. Drug: Marijuana Ainsley Kitty). lives at home with others     PHYSICAL EXAM    (up to 7 for level 4, 8 or more for level 5)     ED Triage Vitals [21 1107]   BP Temp Temp Source Pulse Resp SpO2 Height Weight   (!) 151/85 96.7 °F (35.9 °C) Temporal 86 20 98 % 5' 6\" (1.676 m) (!) 435 lb (197.3 kg)       Physical Exam   Nursing note and vitals reviewed. Constitutional: well-developed, well-nourished, nontoxic, well appearing, not distressed  HEENT:  normocephalic atraumatic, external ears normal appearance, no nasal deformity, no neck masses or edema, patient protecting airway, no stridor, phonating well  posterior Pharynx erythematous with no swelling or exudates. Uvula midline. Airway patent. No peritonsillar abscess.   Eyes: pupils equal, sclera non-icteric, no discharge  Cardiovascular: no JVD  Respiratory: non-labored breathing, effort normal, no accessory muscle use visulized, no audible wheezing  Gastrointestinal: Abdomen not distended  Musculoskeletal: moves extremities without impaired range of motion, no deformity, no edema  Skin: no pallor, no rashes visible  Neuro: alert and oriented times 3, GCS 15, normal coordination, no dysarthria or aphasia  Psych: normal mood and affect, cooperative, normal thought content            DIAGNOSTIC RESULTS     EKG: All EKG's are interpreted by the Emergency Department Physician who either signs or Co-signs this chart in the absence of a cardiologist.        RADIOLOGY:   All plain film, CT, MRI, and formal ultrasound images (except ED bedside ultrasound) are read by the radiologist, see reports below, unless otherwise noted in MDM or here. No orders to display       No results found. LABS:  Labs Reviewed   STREP SCREEN GROUP A THROAT   COVID-19, RAPID       All other labs were within normal range or not returned as of this dictation. EMERGENCY DEPARTMENT COURSE and DIFFERENTIAL DIAGNOSIS/MDM:   Vitals:    Vitals:    11/09/21 1107   BP: (!) 151/85   Pulse: 86   Resp: 20   Temp: 96.7 °F (35.9 °C)   TempSrc: Temporal   SpO2: 98%   Weight: (!) 435 lb (197.3 kg)   Height: 5' 6\" (1.676 m)         Patient instructed to return to the emergency room if symptoms worsen, return, or any other concern right away which is agreed by the patient    ED MEDS:  Orders Placed This Encounter   Medications    ibuprofen (ADVIL;MOTRIN) tablet 800 mg    ibuprofen (IBU) 600 MG tablet     Sig: Take 1 tablet by mouth every 6 hours as needed for Pain     Dispense:  30 tablet     Refill:  0    guaiFENesin (MUCINEX) 600 MG extended release tablet     Sig: Take 1 tablet by mouth 2 times daily for 15 days     Dispense:  30 tablet     Refill:  0         CONSULTS:  None    PROCEDURES:  None      FINAL IMPRESSION      1. Acute pharyngitis, unspecified etiology    2.  Acute upper respiratory infection          DISPOSITION/PLAN   DISPOSITION Decision To Discharge    PATIENT REFERRED TO:  Charli Rich, APRN - CNP  [address] [facility] [address]  [address]  [address]  [facility] [address]  [phone]          DISCHARGE MEDICATIONS:  Discharge Medication List as of 11/9/2021  1:00 PM      START taking these medications    Details   ibuprofen (IBU) 600 MG tablet Take 1 tablet by mouth every 6 hours as needed for Pain, Disp-30 tablet, R-0Print               Summation      Patient Course:      Throat, congestion, cough times several days. Vital signs are stable. Patient is speaking in full sentences. Normal phonation. Strep and Covid are negative. Daughters are also sick with similar symptoms. Suspect URI. Recommend Motrin, Tylenol, over-the-counter cough medications. Strict return precautions discussed with patient. She is agreeable plan. Discussed results and plan with the pt. They expressed appropriate understanding. Pt given close follow up, supportive care instructions and strict return instructions at the bedside. The care is provided during an unprecedented national emergency due to the novel coronavirus, COVID-19. ED Medications administered this visit:    Medications   ibuprofen (ADVIL;MOTRIN) tablet 800 mg (800 mg Oral Given 11/9/21 1225)       New Prescriptions from this visit:    Discharge Medication List as of 11/9/2021  1:00 PM      START taking these medications    Details   ibuprofen (IBU) 600 MG tablet Take 1 tablet by mouth every 6 hours as needed for Pain, Disp-30 tablet, R-0Print             Follow-up:  Mary Loera, SHEILA - CNP  [address] [facility] [address] [address] [facility] ED  [facility] [address] [phone]            Final Impression:   1. Acute pharyngitis, unspecified etiology    2.  Acute upper respiratory infection               (Please note that portions of this note were completed with a voice recognition program )        Ilir Snider [address], PA-C  11/09/21 7323

## 2021-11-09 NOTE — ED PROVIDER NOTES
[address]  Emergency Department  Independent Attestation     Pt Name: Jose Castaneda  MRN: [id number]  [place] 1990  Date of evaluation: 11/9/21       Jose Castaneda is a 27 y.o. female who presents with Pharyngitis      I was personally available for consultation in the Emergency Department. The care is provided during an unprecedented national emergency due to the novel coronavirus, COVID-19.     Janet Chaudhry DO  Attending Emergency Physician  [address]      (Please note that portions of this note were completed with a voice recognition program.  Efforts were made to edit the dictations but occasionally words are mis-transcribed.)        Janet Chaudhry DO  11/09/21 1130

## 2021-11-10 ENCOUNTER — HOSPITAL ENCOUNTER (OUTPATIENT)
Age: 31
Setting detail: SPECIMEN
Discharge: HOME | End: 2021-11-10
Payer: COMMERCIAL

## 2021-11-10 DIAGNOSIS — Z13.220 LIPID SCREENING: ICD-10-CM

## 2021-11-10 DIAGNOSIS — Z76.89 ENCOUNTER TO ESTABLISH CARE: ICD-10-CM

## 2021-11-10 DIAGNOSIS — R53.83 FATIGUE, UNSPECIFIED TYPE: ICD-10-CM

## 2021-11-10 DIAGNOSIS — Z11.59 NEED FOR HEPATITIS C SCREENING TEST: ICD-10-CM

## 2021-11-10 DIAGNOSIS — R30.0 DYSURIA: ICD-10-CM

## 2021-11-10 DIAGNOSIS — Z11.4 ENCOUNTER FOR SCREENING FOR HIV: ICD-10-CM

## 2021-11-10 DIAGNOSIS — D64.9 ANEMIA, UNSPECIFIED TYPE: ICD-10-CM

## 2021-11-10 LAB
BILIRUBIN URINE: NEGATIVE
CHOLESTEROL/HDL RATIO: 4
CHOLESTEROL: 170 MG/DL
COLOR: YELLOW
COMMENT UA: NORMAL
GLUCOSE URINE: NEGATIVE
HDLC SERPL-MCNC: 42 MG/DL
HEPATITIS C ANTIBODY: NONREACTIVE
HIV AG/AB: NONREACTIVE
KETONES, URINE: NEGATIVE
LDL CHOLESTEROL: 112 MG/DL (ref 0–130)
LEUKOCYTE ESTERASE, URINE: NEGATIVE
NITRITE, URINE: NEGATIVE
PH UA: 7.5 (ref 5–8)
PROTEIN UA: NEGATIVE
SPECIFIC GRAVITY UA: 1.01 (ref 1–1.03)
TRIGL SERPL-MCNC: 81 MG/DL
TSH SERPL DL<=0.05 MIU/L-ACNC: 2.19 MIU/L (ref 0.3–5)
TURBIDITY: CLEAR
URINE HGB: NEGATIVE
UROBILINOGEN, URINE: NORMAL
VLDLC SERPL CALC-MCNC: NORMAL MG/DL (ref 1–30)

## 2021-11-12 LAB — VZV IGG SER QL IA: 2.27

## 2021-11-30 ENCOUNTER — HOSPITAL ENCOUNTER (OUTPATIENT)
Dept: SLEEP CENTER | Age: 31
Discharge: HOME | End: 2021-12-02
Payer: COMMERCIAL

## 2021-11-30 DIAGNOSIS — R06.81 WITNESSED EPISODE OF APNEA: ICD-10-CM

## 2021-11-30 DIAGNOSIS — R06.83 SNORING: ICD-10-CM

## 2021-11-30 PROCEDURE — 95810 POLYSOM 6/> YRS 4/> PARAM: CPT

## 2021-12-01 VITALS
HEIGHT: 66 IN | BODY MASS INDEX: 47.09 KG/M2 | OXYGEN SATURATION: 92 % | HEART RATE: 89 BPM | WEIGHT: 293 LBS | RESPIRATION RATE: 20 BRPM

## 2021-12-01 ASSESSMENT — SLEEP AND FATIGUE QUESTIONNAIRES
ESS TOTAL SCORE: 4
HOW LIKELY ARE YOU TO NOD OFF OR FALL ASLEEP WHILE SITTING AND TALKING TO SOMEONE: 0
HOW LIKELY ARE YOU TO NOD OFF OR FALL ASLEEP WHILE SITTING QUIETLY AFTER LUNCH WITHOUT ALCOHOL: 0
HOW LIKELY ARE YOU TO NOD OFF OR FALL ASLEEP WHILE SITTING AND READING: 0
HOW LIKELY ARE YOU TO NOD OFF OR FALL ASLEEP IN A CAR, WHILE STOPPED FOR A FEW MINUTES IN TRAFFIC: 0
HOW LIKELY ARE YOU TO NOD OFF OR FALL ASLEEP WHILE SITTING INACTIVE IN A PUBLIC PLACE: 0
HOW LIKELY ARE YOU TO NOD OFF OR FALL ASLEEP WHEN YOU ARE A PASSENGER IN A CAR FOR AN HOUR WITHOUT A BREAK: 1
HOW LIKELY ARE YOU TO NOD OFF OR FALL ASLEEP WHILE LYING DOWN TO REST IN THE AFTERNOON WHEN CIRCUMSTANCES PERMIT: 1
HOW LIKELY ARE YOU TO NOD OFF OR FALL ASLEEP WHILE WATCHING TV: 2

## 2021-12-07 LAB — STATUS: NORMAL

## 2021-12-07 PROCEDURE — 95810 POLYSOM 6/> YRS 4/> PARAM: CPT | Performed by: PSYCHIATRY & NEUROLOGY

## 2021-12-17 ENCOUNTER — OFFICE VISIT (OUTPATIENT)
Dept: BARIATRICS/WEIGHT MGMT | Age: 31
End: 2021-12-17
Payer: MEDICARE

## 2021-12-17 VITALS
BODY MASS INDEX: 47.09 KG/M2 | SYSTOLIC BLOOD PRESSURE: 160 MMHG | WEIGHT: 293 LBS | DIASTOLIC BLOOD PRESSURE: 100 MMHG | HEIGHT: 66 IN | HEART RATE: 90 BPM

## 2021-12-17 DIAGNOSIS — I10 ESSENTIAL HYPERTENSION: Primary | ICD-10-CM

## 2021-12-17 DIAGNOSIS — Z99.89 OSA ON CPAP: ICD-10-CM

## 2021-12-17 DIAGNOSIS — G47.33 OSA ON CPAP: ICD-10-CM

## 2021-12-17 DIAGNOSIS — K21.9 GASTROESOPHAGEAL REFLUX DISEASE WITHOUT ESOPHAGITIS: ICD-10-CM

## 2021-12-17 DIAGNOSIS — E66.01 MORBID OBESITY WITH BMI OF 70 AND OVER, ADULT (HCC): ICD-10-CM

## 2021-12-17 PROCEDURE — 99204 OFFICE O/P NEW MOD 45 MIN: CPT | Performed by: SURGERY

## 2021-12-17 NOTE — PROGRESS NOTES
[facility]  [facility]  [address] [facility] [address]  Dept: [phone]    SURGICAL WEIGHT MANAGEMENT PROGRAM  PROGRESS NOTE INITIAL EVALUATION     Patient: Gilman Essex        Service Date: 12/17/2021      HPI:     Chief Complaint   Patient presents with    Bariatric, Initial Visit     [facility]    Weight Loss       The patient is a pleasant 27y.o. year old female  with morbid obesity, who stands Height: 5' 6\" (167.6 cm) tall with a weight of Weight: (!) 442 lb (200.5 kg) , resulting in a BMI of Body mass index is 71.34 kg/m². . The patient suffers from multiple co-morbidities as a result of morbid obesity, including: Hypertension, Obstructive Sleep Apnea, Dyspnea on Exertion, GERD and Degenerative Joint Disease (DJD). She has suffered from obesity for many years. The patient denies  a history of myocardial infarction, deep vein thrombosis, pulmonary embolism, renal failure, hepatic failure and stroke. The patient has failed multiple attempts at non-surgical weight loss, and is now seeking surgical intervention to promote permanent and consistent weight loss. She  has chosen Sleeve Gastrectomy. She is well educated regarding it, as she has recently viewed our weight loss surgery informational seminar .      Medical History:  Past Medical History:   Diagnosis Date    Class 3 severe obesity due to excess calories without serious comorbidity with body mass index (BMI) of 60.0 to 69.9 in adult [facility])     COVID-19 virus infection     Depression 05/01/2017    Essential hypertension        Surgical History:  Past Surgical History:   Procedure Laterality Date    WISDOM TOOTH EXTRACTION Left 2014       Family History:      Problem Relation Age of Onset    Diabetes Mother        Social History:   Social History     Tobacco Use    Smoking status: Former Smoker     Types: Cigars     Start date: 2003     Quit date: 9/1/2021     Years since quitting: 0. 3    Smokeless tobacco: Never Used    Tobacco comment: 1-2 black and mild   Vaping Use    Vaping Use: Never used   Substance Use Topics    Alcohol use: Yes     Comment: Occasionally     Drug use: Not Currently     Types: Marijuana Elijah Palacio)       Current Med List:  Current Outpatient Medications   Medication Sig Dispense Refill    ibuprofen (IBU) 600 MG tablet Take 1 tablet by mouth every 6 hours as needed for Pain 30 tablet 0    lisinopril (PRINIVIL;ZESTRIL) 10 MG tablet Take 1 tablet by mouth daily 30 tablet 3    albuterol sulfate HFA (VENTOLIN HFA) 108 (90 Base) MCG/ACT inhaler Inhale 2 puffs into the lungs 4 times daily as needed for Wheezing 1 Inhaler 0    norethindrone-ethinyl estradiol-Fe (LO LOESTRIN FE) 1 MG-10 MCG / 10 MCG tablet Take 1 tablet by mouth daily 28 tablet 3     No current facility-administered medications for this visit. No Known Allergies    SOCIAL:      This patient is alone for the evaluation today.       [] HIV Risk Factors (i.e.) intravenous drug abuser; at risk sexual behavior; received blood products    [] TB Risk Factors (i.e.) Medically underserved, institutional care, foreign born, endemic area; exposure to active case    [] Hepatitis B&C Risk Factors (i.e.) Received blood transfusion prior to 1992; recreational drug use; high risk sexual behaviors; tattoos or body piercings; contact with blood or needle sticks in the workplace    Comprehension    Ability to grasp concepts and respond to questions:   [x] High   [] Medium   [] Low    Motivation    [x] Asks Questions; eager to learn   [] Needs education   [] Extreme anxiety    [] uncooperative   [] Denies need for education    English Speaking Ability    [x] Speaks English well   [x] Reads English well   [x] Understands spoken english    [x] Understands written English   [] No need for interpretive support      [] Might benefit from interpretive support   []  required for all services     REVIEW OF SYSTEMS: (Negative unless marked otherwise)     See review of Systems scanned into media    PRESENT ILLNESS:     Weight Parameters  Weight (!) 442 lb (200.5 kg)   Height 5' 6\" (1.676 m)   BMI Body mass index is 71.34 kg/m². IBW     EBW               IMMUNIZATION STATUS  Immunization History   Administered Date(s) Administered    HPV Quadrivalent (Gardasil) 03/12/2007, 05/21/2007, 09/17/2007    Hib vaccine 11/15/1990    Influenza A ([id number]),all Formulations 11/28/2009    Influenza Virus Vaccine 01/22/2014    MMRV (ProQuad) 11/15/1990    PPD Test 08/16/2006, 02/08/2008, 11/10/2010, 09/04/2012, 06/07/2017, 07/11/2017    Pneumococcal Polysaccharide ([id number]) 05/04/2016    Td vaccine (adult) 02/01/2005, 07/16/2010    Tdap (Boostrix, Adacel) 09/27/2007, 03/01/2016       FALLS ASSESSMENT    [x] LOW RISK FOR FALLS    [] MODERATE RISK FOR FALLS    [] Difficulty walking/selfcare    [] Falls in the past 2 months    [] Suspicion of Clinician    [] Other:      SMOKING CESSATION     [x] Not needed     [] Instructed to stop smoking    [] Pamphlet community resources given     VTE SCREEN    [] Family hx DVT/PE  /   [] Personal hx of DVT/PE    [x] Denies any family or personal hx of DVT/PE    Physician Review    [x] Past medical, family, & social history reviewed and discussed with patient. Review of surgery and post-surgical changes (by surgeon for surgical patients only)    [x] Lifelong diet expectations reviewed with patient    [x] Need for lifelong vitamin supplementation reviewed with patient    PHYSICAL EXAMINATION:      BP (!) 160/100   Pulse 90   Ht 5' 6\" (1.676 m)   Wt (!) 442 lb (200.5 kg)   BMI 71.34 kg/m²     Constitutional:  Vital signs are normal. The patient appears well-developed   HEENT:      Head: Normocephalic. Atraumatic     Eyes: pupils are equal and reactive. No scleral icterus is present. Neck: No mass and no thyromegaly present.    Cardiovascular: Normal rate, regular rhythm, S1 normal and S2 normal.  Bilateral pulses present. Pulmonary/Chest: Effort normal and breath sounds normal. No retractions. Abdominal: Soft. Normal appearance. There is no organomegaly. No tenderness. There is no rigidity, no rebound, no guarding and no Lynn's sign. Musculoskeletal:      Right lower leg: Normal. No tenderness and no edema. Left lower leg: Normal. No tenderness and no edema. Lymphadenopathy:     No cervical adenopathy, No Exrtemity Adenopathy. Neurological: The patient is alert and oriented. Moving all four extremities equally, sensation grossly intact bilateral.  Skin: Skin is warm, dry and intact. Psychiatric: The patient has a normal mood and affect. Speech is normal and behavior is normal. Judgment and thought content normal. Cognition and memory are normal.     RECOMMENDATIONS:     We spent a great deal of time discussing the risks and benefits of Sleeve Gastrectomy, including but not limited to injury to intra-abdominal organs, breakdown of the gastric staple line, the need for re-operative therapy,  prolonged hospitalization,  mechanical ventilation,  and death. We discussed the possibility of bleeding, the need for blood transfusions, blood clots, hospital-acquired and intra-abdominal infection, anastomotic stricture, and worsening GERD. And we discussed the need for post-operative visit compliance, behavior modifications and diet changes, protein and vitamin supplementation, as well as routine scheduled and dedicated exercise. I instructed the patient to utilize the exercise log that will be given to them at their fist dietician appointment. We discussed the potential weight loss benefit of approximately 50-60% of her excess body weight at 12-18 months post-op, as well as the possibility of insufficient weight loss or weight gain after 2 years post-operative time. Discussed the risk of substance abuse and or nicotine abuse today with patient.  They expressed understanding of the risks of abuse of such drugs. PLAN:       Diagnosis Orders   1. Essential hypertension     2. Gastroesophageal reflux disease without esophagitis     3. LASHAY on CPAP     4. Morbid obesity with BMI of 70 and over, adult [facility])            Initial Testing     Primary Procedure: Sleeve Gastrectomy     Labwork: Initial Pre-surgical Lab Tests (CMP, TSH, Fasting Lipid Profile, Mg, Zinc, Vit B1 (whole blood), Vit B12, 25-OH Vit D, Fe,  Ferritin,  Folate) and Negative serum nicotine prior to submission for pre-auth to rule out nutritional deficiency with BMI 71 and consideration of bariatric surgery    Endoscopic Studies: Upper GI Endoscopy for abdominal pain and GERD which has been untreated. Psychological Assessment: Psychological Evaluation and Clearance to rule out eating disorder with BMI 71    Nutrition Assessment: Bariatric Nutrition Assessment and Clearance    Cardiology Risk Stratification:  Cardiology consult for clearance    Pulmonary Evaluation: Obstructive Sleep Apnea Evaluation, PFT Screen and Copy of Previous Sleep Study    Other  Consultations: Medical clearance with BMI 71 and dyspnea on exertion, hypertension    Physician Supervised Diet and Exercise required by the patients insurance company: 6 months. Surgical Diet requirement:  2 weeks      Final Testing  Screening Chest Xray  and EKG within 6 months of date of surgery    Labwork:  Final Lab Tests  within 3 months of date of surgery (CBC, PT/PTT, BMP)     We had a candid discussion regarding her surgical risks given BMI 71. She would like to pursue medical weight loss.     Electronically signed by Arline Brady DO on 12/23/2021 at 8:34 PM

## 2021-12-28 ENCOUNTER — OFFICE VISIT (OUTPATIENT)
Dept: INTERNAL MEDICINE CLINIC | Age: 31
End: 2021-12-28
Payer: MEDICARE

## 2021-12-28 VITALS
SYSTOLIC BLOOD PRESSURE: 130 MMHG | OXYGEN SATURATION: 97 % | DIASTOLIC BLOOD PRESSURE: 84 MMHG | HEART RATE: 71 BPM | TEMPERATURE: 97.5 F | BODY MASS INDEX: 70.21 KG/M2 | WEIGHT: 293 LBS

## 2021-12-28 DIAGNOSIS — E66.01 MORBID OBESITY (HCC): Primary | ICD-10-CM

## 2021-12-28 DIAGNOSIS — R10.9 RIGHT FLANK PAIN: ICD-10-CM

## 2021-12-28 DIAGNOSIS — R35.0 URINARY FREQUENCY: ICD-10-CM

## 2021-12-28 LAB
BILIRUBIN, POC: 1
BLOOD URINE, POC: NEGATIVE
CLARITY, POC: ABNORMAL
COLOR, POC: YELLOW
GLUCOSE URINE, POC: NEGATIVE
KETONES, POC: NEGATIVE
LEUKOCYTE EST, POC: NEGATIVE
NITRITE, POC: NEGATIVE
PH, POC: 6
PROTEIN, POC: NEGATIVE
SPECIFIC GRAVITY, POC: 1.03
UROBILINOGEN, POC: 0.2

## 2021-12-28 PROCEDURE — G8427 DOCREV CUR MEDS BY ELIG CLIN: HCPCS | Performed by: NURSE PRACTITIONER

## 2021-12-28 PROCEDURE — 81003 URINALYSIS AUTO W/O SCOPE: CPT | Performed by: NURSE PRACTITIONER

## 2021-12-28 PROCEDURE — 99213 OFFICE O/P EST LOW 20 MIN: CPT | Performed by: NURSE PRACTITIONER

## 2021-12-28 PROCEDURE — 1036F TOBACCO NON-USER: CPT | Performed by: NURSE PRACTITIONER

## 2021-12-28 PROCEDURE — G8484 FLU IMMUNIZE NO ADMIN: HCPCS | Performed by: NURSE PRACTITIONER

## 2021-12-28 PROCEDURE — G8417 CALC BMI ABV UP PARAM F/U: HCPCS | Performed by: NURSE PRACTITIONER

## 2021-12-28 ASSESSMENT — ENCOUNTER SYMPTOMS
VOMITING: 0
BACK PAIN: 1
ABDOMINAL PAIN: 0
DIARRHEA: 0
NAUSEA: 0
COUGH: 0
WHEEZING: 0
SHORTNESS OF BREATH: 0

## 2021-12-28 NOTE — LETTER
[facility]  [facility] [address] [place] [address]  Phone: [phone]  Fax: [phone]  [email or web address]. org    12/28/21  Razia [id number]    Dear Dayanna Galan :   Thank you for choosing [facility] for your metabolic and surgical care. Your health and wellness is very important to us. Annual visits and labs are part of your bariatric care and are important for your nutritional health. Based on our records it appears that it is time for annual appointment. Please contact the office at [phone] to schedule this visit, disregard this letter if your appointment has already been scheduled. Also, our Support Group Meetings are here to serve you. Attend the meetings at any time. We have also added fitness services and a discussion group exclusively for people who have had bariatric surgery.       Sincerely,           [facility] Staff         Office of  Josh Hubbard MD, Greer Lay NP

## 2021-12-28 NOTE — PROGRESS NOTES
Visit Information    Have you changed or started any medications since your last visit including any over-the-counter medicines, vitamins, or herbal medicines? no   Have you stopped taking any of your medications? Is so, why? -  no  Are you having any side effects from any of your medications? - no    Have you seen any other physician or provider since your last visit? yes -    Have you had any other diagnostic tests since your last visit?  no   Have you been seen in the emergency room and/or had an admission in a hospital since we last saw you?  no   Have you had your routine dental cleaning in the past 6 months?  no     Do you have an active MyChart account? If no, what is the barrier?   Yes    Patient Care Team:  SHEILA Blackman CNP as PCP - General (Internal Medicine)  SHEILA Blackman CNP as PCP - [facility] Provider    Medical History Review  Past Medical, Family, and Social History reviewed and does contribute to the patient presenting condition    Health Maintenance   Topic Date Due    COVID-19 Vaccine (1) Never done    Flu vaccine (1) 09/01/2021    Depression Monitoring  06/21/2022    Potassium monitoring  08/23/2022    Creatinine monitoring  08/23/2022    DTaP/Tdap/Td vaccine (5 - Td or Tdap) 03/01/2026    Cervical cancer screen  07/01/2026    Hib vaccine  Completed    Hepatitis C screen  Completed    HIV screen  Completed    Hepatitis A vaccine  Aged Out    Hepatitis B vaccine  Aged Out    Meningococcal (ACWY) vaccine  Aged Out    Pneumococcal 0-64 years Vaccine  Aged Out    Varicella vaccine  Discontinued           [address] [address]  Dept: [phone]  Dept Fax: [phone]    Office Progress/Follow Up Note  Date of patient's visit: 12/28/2021   Patient's Name:  Razia Nguyễn  YOB: 1990            Patient Care Team:  SHEILA Blackman CNP as PCP - General (Internal Medicine)  Tomy Ashraf APRN - CNP as PCP - [facility] Empaneled Provider    REASON FOR VISIT: Results (here for follow up for lab results. ), Back Pain (wants to discuss lower right back pain. Has been bothering her for past 2 months, uses ibuprofen for treatment. ), and Forms (needs clearance form completed for weight loss surgery)        HISTORY OF PRESENT ILLNESS:      History was obtained from the patient. Ivis Luo is a 27 y.o. is here for Results (here for follow up for lab results. ), Back Pain (wants to discuss lower right back pain. Has been bothering her for past 2 months, uses ibuprofen for treatment. ), and Forms (needs clearance form completed for weight loss surgery)    HPI  Labs from 11/10 reviewed with patient, unremarkable. Has chronic low back pain issues, worse in the right side for years, getting worse, she knows it is related to her weight. Better with ibuprofen. She is asking for urine test, because she has intermittent urinary frequency/urgency, sometimes with decreased urine, had urine culture done which was negative last month. She has met with Dr. Sanjeev Barriga with weight clinic, is planning to proceed with weight loss surgery. Needs form completed.     Patient Active Problem List   Diagnosis    Abnormal uterine bleeding    Mirena IUD    Tobacco abuse    Marijuana abuse    Chronic hypertension (no meds)    Attention deficit hyperactivity disorder (ADHD)    Contraceptive surveillance    Depression    Dysplasia of cervix    Morbid obesity ([address]    Seasonal allergies    Secondary amenorrhea    Pneumonia due to COVID-19 virus    Iron deficiency anemia due to chronic blood loss    Sepsis with acute hypoxic respiratory failure without septic shock (HCC)    Vitamin D deficiency    Snoring    Witnessed episode of apnea       No Known Allergies      MEDICATIONS:     Current Outpatient Medications   Medication Sig Dispense Refill    ibuprofen (IBU) 600 MG tablet Take 1 tablet by mouth every 6 hours as needed for Pain 30 tablet 0    lisinopril (PRINIVIL;ZESTRIL) 10 MG tablet Take 1 tablet by mouth daily 30 tablet 3    albuterol sulfate HFA (VENTOLIN HFA) 108 (90 Base) MCG/ACT inhaler Inhale 2 puffs into the lungs 4 times daily as needed for Wheezing 1 Inhaler 0    norethindrone-ethinyl estradiol-Fe (LO LOESTRIN FE) 1 MG-10 MCG / 10 MCG tablet Take 1 tablet by mouth daily 28 tablet 3     No current facility-administered medications for this visit. SOCIAL HISTORY    Reviewed and updated. Yoshi Recinos  reports that she quit smoking about 4 months ago. Her smoking use included cigars. She started smoking about 19 years ago. She has never used smokeless tobacco.    FAMILY HISTORY:    Reviewed and updated. family history includes Diabetes in her mother. REVIEW OF SYSTEMS:      Review of Systems   Constitutional: Negative for chills, fatigue and fever. Respiratory: Negative for cough, shortness of breath and wheezing. Cardiovascular: Negative for chest pain and palpitations. Gastrointestinal: Negative for abdominal pain, diarrhea, nausea and vomiting. Genitourinary: Positive for decreased urine volume, frequency and urgency. Negative for difficulty urinating and dysuria. Musculoskeletal: Positive for back pain. PHYSICAL EXAM:      Vitals:    12/28/21 1256   BP: 130/84   Pulse: 71   Temp: 97.5 °F (36.4 °C)   SpO2: 97%   Weight: (!) 435 lb (197.3 kg)     BP Readings from Last 3 Encounters:   01/05/22 (!) 143/82   12/28/21 130/84   12/17/21 (!) 160/100        Physical Exam  Constitutional:       General: She is not in acute distress. Appearance: She is well-developed. She is obese. HENT:      Head: Normocephalic and atraumatic. Right Ear: External ear normal.      Left Ear: External ear normal.   Eyes:      General:         Right eye: No discharge. Left eye: No discharge. Conjunctiva/sclera: Conjunctivae normal.   Neck:      Thyroid: No thyromegaly.    Cardiovascular: Rate and Rhythm: Normal rate and regular rhythm. Pulses: Normal pulses. Heart sounds: Heart sounds are distant. Pulmonary:      Effort: Pulmonary effort is normal.      Breath sounds: No wheezing or rhonchi. Abdominal:      General: Bowel sounds are normal. There is no distension. Palpations: Abdomen is soft. Tenderness: There is no abdominal tenderness. Musculoskeletal:      Cervical back: Normal range of motion and neck supple. No deformity or tenderness. Thoracic back: No deformity or tenderness. Lumbar back: Tenderness present. No deformity. Normal range of motion. Right lower leg: No edema. Left lower leg: No edema. Skin:     General: Skin is warm and dry. Neurological:      Mental Status: She is alert and oriented to person, place, and time.       Gait: Gait normal.   Psychiatric:         Mood and Affect: Mood normal.         Behavior: Behavior normal.          LABORATORY FINDINGS:    CBC:   Lab Results   Component Value Date    WBC 4.2 08/23/2021    HGB 10.4 08/23/2021     08/23/2021     BMP:   Lab Results   Component Value Date     08/23/2021    K 4.4 08/23/2021     08/23/2021    CO2 22 08/23/2021    BUN 6 08/23/2021    CREATININE 0.73 08/23/2021    GLUCOSE 130 08/23/2021     HEMOGLOBIN A1C:   Lab Results   Component Value Date    LABA1C 5.3 12/14/2018     FASTING LIPID PANEL:   Lab Results   Component Value Date    CHOL 170 11/10/2021    HDL 42 11/10/2021    LDLCHOLESTEROL 112 11/10/2021    TRIG 81 11/10/2021       ASSESSMENT AND PLAN:      Visit Diagnoses and Associated Orders     Morbid obesity ([facility] [address]    -  Primary    Patient to proceed with planned weight loss surgery, letter completed with review of weights in epic         Right flank pain        UA dip negative, advised to drink plenty of water    POCT Urinalysis No Micro (Auto) [11188 Custom]           Urinary frequency        POCT Urinalysis No Micro (Auto) [23503 Custom] FOLLOW UP AND INSTRUCTIONS:     Return in about 3 months (around 3/28/2022) for routine follow up, or sooner if needed. · Braulio Thakkar received counseling on the following healthy behaviors: nutrition and exercise    · All patient questions answered. Pt voiced understanding. SHEILA Rasmussen - CHER    1/14/2022, 7:24 PM    Please note that this chart was generated using voice recognition Dragon dictation software. Although every effort was made to ensure the accuracy of this automatedtranscription, some errors in transcription may have occurred.

## 2021-12-28 NOTE — LETTER
12/28/2021     Re:  Abe [id number]  1990        To Whom it May Concern: The above named patient has been seen by our office for 4 months. She suffers from the following co morbidities: HTN, LASHAY    The patient's current weight is 435 lbs, and  Body mass index is 70.21 kg/m². . The patient has undergone the following weight loss attempts: diet changes, exercise for at least 6 months. Review of Epic records shows the following weights:     12/7/2018 377 lbs  11/26/2019 370 lbs  2/11/2020 380 lbs  3/23/2021 420 lbs    I feel this patient would benefit from weight loss surgery because  Arna Frame has been unsuccessful losing weight with other diet methods, and medical conditions will become life-threatening if  Arna Frame does not get help getting the weight under control. I appreciate your consideration for approval.  Please feel free to contact me for any further information.     Sincerely,    SHEILA Crowder - CNP

## 2021-12-28 NOTE — LETTER
To Whom It May Concern:      I am sending this letter on behalf of Razia Nguyễn for Pre-approval for Bariatric Surgery; specifically the {[facility] BARIATRIC PROCEDURES:2102}. The Procedure will be {OUTPATIENT/INPATIENT:[id number]} at *** with a scheduled surgery date of ***. Razia Nguyễn is 27 y.o. (: 1990), weighs Weight: (!) 435 lb (197.3 kg) and is   tall. That gives her a Body Mass Index of Body mass index is 70.21 kg/m². Malik Euceda Along with Morbid Obesity, the patient has a history of {Diagnoses; bariatric surgery:2102}. The patient has made numerous attempts at dieting and exercise program, and has failed to maintain any sustained weight loss. A letter of necessity is enclosed from her primary care physician SHEILA Blackman CNP. Razia Nguyễn has been evaluated in our bariatric program by myself, the , and the program dietician and is felt to be an excellent candidate for surgery. In addition, she has undergone a psychological evaluation, from which a letter is enclosed. The patient has undergone extensive pre-operative education and understands all the risks, benefits, and possible complications of surgery. She has also undergone dietary education and thorough nutritional evaluation via a registered dietician. Our program provides long term nutritional counseling with unlimited consults with the dietician. Furthermore, our program includes ***. Our team is sending this letter to receive pre-approval for Jessica Larkin {[facility] BARIATRIC PROCEDURES:2102}. Please let us know if you have any questions or require any further information.      Sincerely,       ***

## 2022-01-05 ENCOUNTER — HOSPITAL ENCOUNTER (EMERGENCY)
Age: 32
Discharge: HOME | End: 2022-01-05
Attending: EMERGENCY MEDICINE
Payer: MEDICARE

## 2022-01-05 ENCOUNTER — APPOINTMENT (OUTPATIENT)
Dept: GENERAL RADIOLOGY | Age: 32
End: 2022-01-05
Payer: MEDICARE

## 2022-01-05 VITALS
HEIGHT: 66 IN | RESPIRATION RATE: 20 BRPM | DIASTOLIC BLOOD PRESSURE: 82 MMHG | BODY MASS INDEX: 47.09 KG/M2 | OXYGEN SATURATION: 100 % | HEART RATE: 71 BPM | SYSTOLIC BLOOD PRESSURE: 143 MMHG | TEMPERATURE: 97 F | WEIGHT: 293 LBS

## 2022-01-05 DIAGNOSIS — R51.9 LEFT-SIDED FACE PAIN: ICD-10-CM
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
## 2022-01-05 PROCEDURE — 99282 EMERGENCY DEPT VISIT SF MDM: CPT

## 2022-01-05 PROCEDURE — 6370000000 HC RX 637 (ALT 250 FOR IP): Performed by: STUDENT IN AN ORGANIZED HEALTH CARE EDUCATION/TRAINING PROGRAM

## 2022-01-05 PROCEDURE — 70150 X-RAY EXAM OF FACIAL BONES: CPT

## 2022-01-05 RX ORDER — IBUPROFEN 400 MG/1
600 TABLET ORAL ONCE
Status: COMPLETED | OUTPATIENT
Start: 2022-01-05 | End: 2022-01-05

## 2022-01-05 RX ADMIN — IBUPROFEN 600 MG: 400 TABLET, FILM COATED ORAL at 18:05

## 2022-01-05 ASSESSMENT — ENCOUNTER SYMPTOMS
ABDOMINAL PAIN: 0
SHORTNESS OF BREATH: 0
VOMITING: 0
FACIAL SWELLING: 0

## 2022-01-05 ASSESSMENT — PAIN SCALES - GENERAL: PAINLEVEL_OUTOF10: 7
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
## 2022-01-20 ENCOUNTER — HOSPITAL ENCOUNTER (OUTPATIENT)
Dept: LAB | Age: 32
Setting detail: SPECIMEN
Discharge: HOME | End: 2022-01-20

## 2022-01-20 DIAGNOSIS — Z01.818 PREOP TESTING: Primary | ICD-10-CM

## 2022-01-21 ENCOUNTER — HOSPITAL ENCOUNTER (OUTPATIENT)
Dept: LAB | Age: 32
Setting detail: SPECIMEN
Discharge: HOME | End: 2022-01-21
Payer: MEDICARE

## 2022-01-21 PROCEDURE — U0003 INFECTIOUS AGENT DETECTION BY NUCLEIC ACID (DNA OR RNA); SEVERE ACUTE RESPIRATORY SYNDROME CORONAVIRUS 2 (SARS-COV-2) (CORONAVIRUS DISEASE [COVID-19]), AMPLIFIED PROBE TECHNIQUE, MAKING USE OF HIGH THROUGHPUT TECHNOLOGIES AS DESCRIBED BY CMS-2020-01-R: HCPCS

## 2022-01-21 PROCEDURE — U0005 INFEC AGEN DETEC AMPLI PROBE: HCPCS

## 2022-01-22 LAB
SARS-COV-2: NORMAL
SARS-COV-2: NOT DETECTED
SOURCE: NORMAL

## 2022-01-26 ENCOUNTER — HOSPITAL ENCOUNTER (OUTPATIENT)
Dept: SLEEP CENTER | Age: 32
Discharge: HOME | End: 2022-01-28
Payer: MEDICARE

## 2022-01-26 DIAGNOSIS — R06.83 SNORING: ICD-10-CM

## 2022-01-26 DIAGNOSIS — R06.81 WITNESSED EPISODE OF APNEA: ICD-10-CM

## 2022-01-26 PROCEDURE — 95811 POLYSOM 6/>YRS CPAP 4/> PARM: CPT

## 2022-01-27 ENCOUNTER — TELEPHONE (OUTPATIENT)
Dept: BARIATRICS/WEIGHT MGMT | Age: 32
End: 2022-01-27

## 2022-01-27 VITALS
OXYGEN SATURATION: 96 % | HEART RATE: 83 BPM | WEIGHT: 293 LBS | HEIGHT: 66 IN | RESPIRATION RATE: 16 BRPM | BODY MASS INDEX: 47.09 KG/M2

## 2022-02-04 LAB — STATUS: NORMAL

## 2022-02-10 ENCOUNTER — NURSE ONLY (OUTPATIENT)
Dept: BARIATRICS/WEIGHT MGMT | Age: 32
End: 2022-02-10

## 2022-02-10 VITALS — BODY MASS INDEX: 71.73 KG/M2 | WEIGHT: 293 LBS

## 2022-02-10 NOTE — PROGRESS NOTES
Medical Nutrition Therapy  Initial Nutrition Assessment for Metabolic/ Bariatric Surgery  Required insurance visit prior to surgery:  3  Shared with patient the importance of documenting exercise and staying at or below start weight during visits. Pt reports: She had stopped smoking for a few months but recently started smoking again. Pt states she will be able to quit by next apt. Kody Hubbard is a 27 y.o. female with a date of birth of 1990. Weight History: Wt Readings from Last 3 Encounters:   01/27/22 (!) 435 lb (197.3 kg)   01/05/22 (!) 443 lb (200.9 kg)   12/28/21 (!) 435 lb (197.3 kg)        How does your weight affect your daily activities?joint pain and short of breath with activity      What would be different in your life if you felt healthier and fit?more energy      Why is that important to you now? My kids  Do you drink alcohol? YES, occasionally    Do you use tobacco in the form of cigarettes, cigars, chew or any vapor appliance? Yes    Weight History      Barbie Mesa's highest adult weight was 442 lbs at age 27. Patient was at her highest weight for some time. Patient's triggers/known causes to her highest weight are over eating and no exercise. Barbie Mesa's lowest adult weight was 250 lbs at age 24. Patient was at her lowest weight for some time. The lowest weight was achieved through just what Barbie Padilla weighed . Physical Activity  Do you participate in a structured exercise program, step counting or regular physical activity? no      Instructions and exercise logs were provided to patient today see goal sheet and plan. Previous weight loss attempts  Patient has participated in the following weight loss programs:          Nutrition History  Have you ever been diagnosed with an eating disorder? No  Have you ever had problems tolerating a multivitamin or mineral supplement? No  Have you ever been diagnosed with a vitamin or mineral deficiency?  no Patient dines out to a sit down restaurant 3 times per week. Patient dines out to a fast food restaurant 3 times per week. Patient does have grazing. Patient does have night eating. Patient does have a history of emotional eating. Patient does have a history of  eating out of boredom. Drinks throughout the day: water    24 hour recall/food frequency: has been scanned into chart unless completed below. Assessment:  Nutritional Needs:  Men: 1500kcal daily minimum     Women 1200kcal daily minimum. 60-80gm of protein daily  PES Statement:  Obesity related to a complex combination of decreased energy needs, disordered eating patterns, physical inactivity, and increased psychological/life stress as evidenced by BMI greater than 30 and inability to maintain a significant amount of weight loss through conventional weight loss interventions. Goals    All goals were planned with and agreed on by the patient. I want to improve my health because I want to be there for my kids. appt # NA G What is your next step? C 1 2 3 4 5 6 7 8 9     0  1 I will read the education binder provided to me and the                 0  2 I will make my pschological evaluation appoinment. 0  3 I will bring this goal card to every appointment. 0  4 I will eliminate all tobacco/nicotine. x 5 I will limit alcoholic beverages to 8-3ME per week. 6 I will limit dining out to 3 times per week or less. 7 I will eliminate sugary beverages. 8 I will eliminate carbonated beverages. 9 I will eliminate drinking with a straw. 10 I will limit caffeinated beverages to 16oz daily. 0  11 I will limit log my exercise daily. 12 I will determine my calcium and mvi plan.                  13 I will have 1-2 servings of lean protein present at each meal and minimeal. 14 I will eat every 3-5 hours. x 15 I will drink 64oz of fluid daily. 16 I will eat slowly during meals and snacks. 17 I will limit fluids 4oz before after and during meals. 18 I will eat protein first at all meals followed by vegetables,  Fruit and lastly whole grains. 23 My first weight neutral approach is:                 20 My second weight neutral approach is:                 21  My Thirds weight neutral approach is:                 22                  23                  24                  25                  Goals reviewed with patient as below  Do you understand your goals? Do you have the information you need to achieve your goals? Do you have any questions  right now? Plan    Exercise for Health 15 easy exercises to do at home with 6 activity logs were provided to the patient with verbal and written instructions on how to carry this out. Goal number 14 was provided to the patient on this visit please see above. Will follow up each month and provide support as patient begins to add physical activity to life style. Monitor and review goals adjust as needed. Follow up monthly supervised diet and exercise.        Maritza Santos, MS, RD, LD

## 2022-03-04 ENCOUNTER — TELEPHONE (OUTPATIENT)
Dept: BARIATRICS/WEIGHT MGMT | Age: 32
End: 2022-03-04

## 2022-03-04 NOTE — TELEPHONE ENCOUNTER
Called patient regarding missed appointment today. Patient stated they would call back to reschedule.

## 2022-03-15 ENCOUNTER — TELEPHONE (OUTPATIENT)
Dept: BARIATRICS/WEIGHT MGMT | Age: 32
End: 2022-03-15

## 2022-03-25 ENCOUNTER — TELEPHONE (OUTPATIENT)
Dept: BARIATRICS/WEIGHT MGMT | Age: 32
End: 2022-03-25

## 2022-07-01 ENCOUNTER — HOSPITAL ENCOUNTER (EMERGENCY)
Age: 32
Discharge: HOME | End: 2022-07-01
Attending: EMERGENCY MEDICINE
Payer: MEDICARE

## 2022-07-01 ENCOUNTER — APPOINTMENT (OUTPATIENT)
Dept: GENERAL RADIOLOGY | Age: 32
End: 2022-07-01
Payer: MEDICARE

## 2022-07-01 VITALS
HEIGHT: 66 IN | WEIGHT: 293 LBS | BODY MASS INDEX: 47.09 KG/M2 | DIASTOLIC BLOOD PRESSURE: 92 MMHG | RESPIRATION RATE: 19 BRPM | OXYGEN SATURATION: 94 % | TEMPERATURE: 100.7 F | HEART RATE: 111 BPM | SYSTOLIC BLOOD PRESSURE: 158 MMHG

## 2022-07-01 DIAGNOSIS — U07.1 COVID-19: Primary | ICD-10-CM

## 2022-07-01 LAB
ABSOLUTE EOS #: 0.08 K/UL (ref 0–0.44)
ABSOLUTE IMMATURE GRANULOCYTE: <0.03 K/UL (ref 0–0.3)
ABSOLUTE LYMPH #: 1.08 K/UL (ref 1.1–3.7)
ABSOLUTE MONO #: 0.87 K/UL (ref 0.1–1.2)
ALBUMIN SERPL-MCNC: 3.8 G/DL (ref 3.5–5.2)
ALBUMIN/GLOBULIN RATIO: 1.1 (ref 1–2.5)
ALP BLD-CCNC: 65 U/L (ref 35–104)
ALT SERPL-CCNC: 20 U/L (ref 5–33)
ANION GAP SERPL CALCULATED.3IONS-SCNC: 10 MMOL/L (ref 9–17)
AST SERPL-CCNC: 21 U/L
BASOPHILS # BLD: 1 % (ref 0–2)
BASOPHILS ABSOLUTE: 0.04 K/UL (ref 0–0.2)
BILIRUB SERPL-MCNC: 0.19 MG/DL (ref 0.3–1.2)
BUN BLDV-MCNC: 9 MG/DL (ref 6–20)
C-REACTIVE PROTEIN: 15 MG/L (ref 0–5)
CALCIUM SERPL-MCNC: 8.9 MG/DL (ref 8.6–10.4)
CARBOXYHEMOGLOBIN: 2.5 % (ref 0–5)
CHLORIDE BLD-SCNC: 100 MMOL/L (ref 98–107)
CO2: 25 MMOL/L (ref 20–31)
CREAT SERPL-MCNC: 0.85 MG/DL (ref 0.5–0.9)
D-DIMER QUANTITATIVE: 0.56 MG/L FEU
EOSINOPHILS RELATIVE PERCENT: 1 % (ref 1–4)
FIO2: NORMAL
FLU A ANTIGEN: NEGATIVE
FLU B ANTIGEN: NEGATIVE
GFR AFRICAN AMERICAN: >60 ML/MIN
GFR NON-AFRICAN AMERICAN: >60 ML/MIN
GFR SERPL CREATININE-BSD FRML MDRD: ABNORMAL ML/MIN/{1.73_M2}
GLUCOSE BLD-MCNC: 103 MG/DL (ref 70–99)
HCG QUALITATIVE: NEGATIVE
HCO3 VENOUS: 24.4 MMOL/L (ref 24–30)
HCT VFR BLD CALC: 37.7 % (ref 36.3–47.1)
HEMOGLOBIN: 12.2 G/DL (ref 11.9–15.1)
IMMATURE GRANULOCYTES: 0 %
INR BLD: 0.9
LACTIC ACID, SEPSIS WHOLE BLOOD: 1.5 MMOL/L (ref 0.5–1.9)
LIPASE: 32 U/L (ref 13–60)
LYMPHOCYTES # BLD: 14 % (ref 24–43)
MCH RBC QN AUTO: 26.8 PG (ref 25.2–33.5)
MCHC RBC AUTO-ENTMCNC: 32.4 G/DL (ref 28.4–34.8)
MCV RBC AUTO: 82.9 FL (ref 82.6–102.9)
MONOCYTES # BLD: 11 % (ref 3–12)
NEGATIVE BASE EXCESS, VEN: 0.3 MMOL/L (ref 0–2)
NRBC AUTOMATED: 0 PER 100 WBC
O2 SAT, VEN: 75.8 % (ref 60–85)
PARTIAL THROMBOPLASTIN TIME: 23.3 SEC (ref 20.5–30.5)
PATIENT TEMP: 37
PCO2, VEN: 42.2 (ref 39–55)
PDW BLD-RTO: 17.2 % (ref 11.8–14.4)
PH VENOUS: 7.38 (ref 7.32–7.42)
PLATELET # BLD: 250 K/UL (ref 138–453)
PMV BLD AUTO: 10.9 FL (ref 8.1–13.5)
PO2, VEN: 41 (ref 30–50)
POTASSIUM SERPL-SCNC: 3.9 MMOL/L (ref 3.7–5.3)
PRO-BNP: 24 PG/ML
PROTHROMBIN TIME: 10 SEC (ref 9.1–12.3)
RBC # BLD: 4.55 M/UL (ref 3.95–5.11)
RBC # BLD: ABNORMAL 10*6/UL
SARS-COV-2, RAPID: DETECTED
SEDIMENTATION RATE, ERYTHROCYTE: 31 MM/HR (ref 0–20)
SEG NEUTROPHILS: 73 % (ref 36–65)
SEGMENTED NEUTROPHILS ABSOLUTE COUNT: 5.92 K/UL (ref 1.5–8.1)
SODIUM BLD-SCNC: 135 MMOL/L (ref 135–144)
SPECIMEN DESCRIPTION: ABNORMAL
TOTAL PROTEIN: 7.4 G/DL (ref 6.4–8.3)
TROPONIN, HIGH SENSITIVITY: 8 NG/L (ref 0–14)
WBC # BLD: 8 K/UL (ref 3.5–11.3)

## 2022-07-01 PROCEDURE — 85379 FIBRIN DEGRADATION QUANT: CPT

## 2022-07-01 PROCEDURE — 99285 EMERGENCY DEPT VISIT HI MDM: CPT

## 2022-07-01 PROCEDURE — 6370000000 HC RX 637 (ALT 250 FOR IP): Performed by: STUDENT IN AN ORGANIZED HEALTH CARE EDUCATION/TRAINING PROGRAM

## 2022-07-01 PROCEDURE — 82805 BLOOD GASES W/O2 SATURATION: CPT

## 2022-07-01 PROCEDURE — 85730 THROMBOPLASTIN TIME PARTIAL: CPT

## 2022-07-01 PROCEDURE — 84484 ASSAY OF TROPONIN QUANT: CPT

## 2022-07-01 PROCEDURE — 87804 INFLUENZA ASSAY W/OPTIC: CPT

## 2022-07-01 PROCEDURE — 93005 ELECTROCARDIOGRAM TRACING: CPT

## 2022-07-01 PROCEDURE — 87635 SARS-COV-2 COVID-19 AMP PRB: CPT

## 2022-07-01 PROCEDURE — 85025 COMPLETE CBC W/AUTO DIFF WBC: CPT

## 2022-07-01 PROCEDURE — 85652 RBC SED RATE AUTOMATED: CPT

## 2022-07-01 PROCEDURE — 85610 PROTHROMBIN TIME: CPT

## 2022-07-01 PROCEDURE — 84703 CHORIONIC GONADOTROPIN ASSAY: CPT

## 2022-07-01 PROCEDURE — 83605 ASSAY OF LACTIC ACID: CPT

## 2022-07-01 PROCEDURE — 71045 X-RAY EXAM CHEST 1 VIEW: CPT

## 2022-07-01 PROCEDURE — 96365 THER/PROPH/DIAG IV INF INIT: CPT

## 2022-07-01 PROCEDURE — 83690 ASSAY OF LIPASE: CPT

## 2022-07-01 PROCEDURE — 80053 COMPREHEN METABOLIC PANEL: CPT

## 2022-07-01 PROCEDURE — 96366 THER/PROPH/DIAG IV INF ADDON: CPT

## 2022-07-01 PROCEDURE — 83880 ASSAY OF NATRIURETIC PEPTIDE: CPT

## 2022-07-01 PROCEDURE — 36415 COLL VENOUS BLD VENIPUNCTURE: CPT

## 2022-07-01 PROCEDURE — 86140 C-REACTIVE PROTEIN: CPT

## 2022-07-01 PROCEDURE — 2580000003 HC RX 258: Performed by: STUDENT IN AN ORGANIZED HEALTH CARE EDUCATION/TRAINING PROGRAM

## 2022-07-01 RX ORDER — 0.9 % SODIUM CHLORIDE 0.9 %
30 INTRAVENOUS SOLUTION INTRAVENOUS ONCE
Status: COMPLETED | OUTPATIENT
Start: 2022-07-01 | End: 2022-07-01

## 2022-07-01 RX ORDER — IBUPROFEN 600 MG/1
600 TABLET ORAL EVERY 6 HOURS PRN
Qty: 30 TABLET | Refills: 0 | Status: SHIPPED | OUTPATIENT
Start: 2022-07-01 | End: 2022-07-25

## 2022-07-01 RX ORDER — ACETAMINOPHEN 500 MG
1000 TABLET ORAL 3 TIMES DAILY
Qty: 30 TABLET | Refills: 0 | Status: SHIPPED | OUTPATIENT
Start: 2022-07-01 | End: 2022-07-25

## 2022-07-01 RX ORDER — ACETAMINOPHEN 500 MG
1000 TABLET ORAL ONCE
Status: COMPLETED | OUTPATIENT
Start: 2022-07-01 | End: 2022-07-01

## 2022-07-01 RX ORDER — ALBUTEROL SULFATE 90 UG/1
2 AEROSOL, METERED RESPIRATORY (INHALATION) EVERY 4 HOURS PRN
Qty: 18 G | Refills: 0 | Status: SHIPPED | OUTPATIENT
Start: 2022-07-01

## 2022-07-01 RX ORDER — IBUPROFEN 400 MG/1
400 TABLET ORAL ONCE
Status: COMPLETED | OUTPATIENT
Start: 2022-07-01 | End: 2022-07-01

## 2022-07-01 RX ADMIN — ACETAMINOPHEN 1000 MG: 500 TABLET ORAL at 21:37

## 2022-07-01 RX ADMIN — IBUPROFEN 400 MG: 400 TABLET, FILM COATED ORAL at 21:37

## 2022-07-01 RX ADMIN — SODIUM CHLORIDE 5988 ML: 9 INJECTION, SOLUTION INTRAVENOUS at 21:30

## 2022-07-01 ASSESSMENT — ENCOUNTER SYMPTOMS
COUGH: 1
ABDOMINAL PAIN: 0
BACK PAIN: 1
VOMITING: 0
SHORTNESS OF BREATH: 1
DIARRHEA: 0
CHEST TIGHTNESS: 1
NAUSEA: 0
SINUS PAIN: 1
EYE PAIN: 0

## 2022-07-01 ASSESSMENT — PAIN SCALES - GENERAL: PAINLEVEL_OUTOF10: 8

## 2022-07-01 ASSESSMENT — PAIN - FUNCTIONAL ASSESSMENT
PAIN_FUNCTIONAL_ASSESSMENT: 0-10
PAIN_FUNCTIONAL_ASSESSMENT: NONE - DENIES PAIN

## 2022-07-01 ASSESSMENT — PAIN DESCRIPTION - LOCATION: LOCATION: CHEST;HEAD

## 2022-07-02 NOTE — ED PROVIDER NOTES
Faculty Sign-Out Attestation  Handoff taken on the following patient from prior Attending Physician: Nelli Davila    I was available and discussed any additional care issues that arose and coordinated the management plans with the resident(s) caring for the patient during my duty period. Any areas of disagreement with residents documentation of care or procedures are noted on the chart. I was personally present for the key portions of any/all procedures during my duty period. I have documented in the chart those procedures where I was not present during the key portions.     Sob, covid + at home, d dimer is years negative, passing her march test without hypoxia, will plan to discharge    Monik Doss DO  Attending Physician     Monik Doss, DO  07/01/22 2336    eval stable, pt tolerated march test without desat,   discharged     Monik Doss,   07/02/22 0880

## 2022-07-02 NOTE — ED PROVIDER NOTES
[facility]  ED  eMERGENCY dEPARTMENT eNCOUnter   Attending Attestation     Pt Name: Jj Montalvo  MRN: [id number]  [place] 1990  Date of evaluation: 7/1/22       Jj Montalvo is a 27 y.o. female who presents with Shortness of Breath and Positive For Covid-19 (took home test and was positive)      History: Patient presents with shortness of breath and positive COVID-19. Patient was COVID-19 positive at home. Patient having shortness of breath, chest pain, fever, body aches. Exam: Rate is mildly elevated. Lungs are clear to auscultation bilaterally. Abdomen is soft, nontender. Patient is awake alert acting appropriately. Plan for official COVID test, labs, will consider scan if there is concern for PE. I performed a history and physical examination of the patient and discussed management with the resident. I reviewed the residents note and agree with the documented findings and plan of care. Any areas of disagreement are noted on the chart. I was personally present for the key portions of any procedures. I have documented in the chart those procedures where I was not present during the key portions. I have personally reviewed all images and agree with the resident's interpretation. I have reviewed the emergency nurses triage note. I agree with the chief complaint, past medical history, past surgical history, allergies, medications, social and family history as documented unless otherwise noted below. Documentation of the HPI, Physical Exam and Medical Decision Making performed by medical students or scribes is based on my personal performance of the HPI, PE and MDM. For Phys Assistant/ Nurse Practitioner cases/documentation I have had a face to face evaluation of this patient and have completed at least one if not all key elements of the E/M (history, physical exam, and MDM). Additional findings are as noted.     For APC cases I have personally evaluated and examined the patient in conjunction with the APC and agree with the treatment plan and disposition of the patient as recorded by the APC.     Silas Pritchard MD  Attending Emergency  Physician       Jeremie Pino MD  07/01/22 2349

## 2022-07-02 NOTE — ED PROVIDER NOTES
[address] [facility]  ED  Emergency Department Encounter  EmergencyMedicineResident     This patient was seen during the COVID-19 crisis. There were limited resources and those resources we did have had to be conserved for the sickest of patients. Pt Name: Iliana Núñez  MRN: [id number]  [place] 1990  Date of evaluation: 7/1/22  PCP: Kalpesh He [facility]       Chief Complaint   Patient presents with    Shortness of Breath    Positive For Covid-19     took home test and was positive       HISTORY OF PRESENT ILLNESS  (Location/Symptom, Timing/Onset, Context/Setting, Quality, Duration, Modifying Factors, Severity.)      Iliana Núñez is a 27 y.o. female who presents evaluation today for shortness of breath, pain with breathing, fatigue, dehydration in the setting of a positive home COVID test.  Patient reports she had COVID previously. She states she is not vaccinated. Symptoms been present for the last 48 hours. She took a OTC Advil containing medication at home without any relief. She works as a phlebotomist and is concerned that she may have came in contact with an ill patient. She reports history of hypertension but does not take any medications on a regular basis. Nothing seems to make it better and she feels that she is generally getting worse. PAST MEDICAL / SURGICAL /SOCIAL / FAMILY HISTORY      has a past medical history of Class 3 severe obesity due to excess calories without serious comorbidity with body mass index (BMI) of 60.0 to 69.9 in adult [facility]), COVID-19 virus infection, Depression, Essential hypertension, and LASHAY (obstructive sleep apnea).       has a past surgical history that includes [place] tooth extraction (Left, 2014).       Social History     Socioeconomic History    Marital status: Single     Spouse name: Not on file    Number of children: Not on file    Years of education: Not on file    Highest education level: Not on file Occupational History    Not on file   Tobacco Use    Smoking status: Former Smoker     Types: Cigars     Start date:      Quit date: 2021     Years since quittin.8    Smokeless tobacco: Never Used    Tobacco comment: 1-2 black and mild   Vaping Use    Vaping Use: Never used   Substance and Sexual Activity    Alcohol use: Yes     Comment: Occasionally     Drug use: Not Currently     Types: Marijuana Ranulfo Sorensen)    Sexual activity: Yes     Partners: Male     Birth control/protection: I.U.D. Other Topics Concern    Not on file   Social History Narrative    Not on file     Social Determinants of Health     Financial Resource Strain: Low Risk     Difficulty of Paying Living Expenses: Not hard at all   Food Insecurity: No Food Insecurity    Worried About Running Out of Food in the Last Year: Never true    [address] in the Last Year: Never true   Transportation Needs: No Transportation Needs    Lack of Transportation (Medical): No    Lack of Transportation (Non-Medical):  No   Physical Activity:     Days of Exercise per Week: Not on file    Minutes of Exercise per Session: Not on file   Stress:     Feeling of Stress : Not on file   Social Connections:     Frequency of Communication with Friends and Family: Not on file    Frequency of Social Gatherings with Friends and Family: Not on file    Attends [employer] Services: Not on file    Active Member of [address] [place] [address] or Organizations: Not on file    Attends Club or Organization Meetings: Not on file    Marital Status: Not on file   Intimate Partner Violence:     Fear of Current or Ex-Partner: Not on file    Emotionally Abused: Not on file    Physically Abused: Not on file    Sexually Abused: Not on file   Housing Stability:     Unable to Pay for Housing in the Last Year: Not on file    Number of [place] in the Last Year: Not on file    Unstable Housing in the Last Year: Not on file       Family History   Problem Relation Age of Onset  Diabetes Mother        Allergies:  Patient has no known allergies. Home Medications:  Prior to Admission medications    Medication Sig Start Date End Date Taking? Authorizing Provider   albuterol sulfate HFA (VENTOLIN HFA) 108 (90 Base) MCG/ACT inhaler Inhale 2 puffs into the lungs every 4 hours as needed for Wheezing 7/1/22  Yes Remy Soriano MD   ibuprofen (IBU) 600 MG tablet Take 1 tablet by mouth every 6 hours as needed for Pain 7/1/22  Yes Remy Soriano MD   acetaminophen (TYLENOL) 500 MG tablet Take 2 tablets by mouth 3 times daily 7/1/22  Yes Remy Soriano MD   lisinopril (PRINIVIL;ZESTRIL) 10 MG tablet Take 1 tablet by mouth daily 8/28/21   Aure Liu MD   norethindrone-ethinyl estradiol-Fe (LO LOESTRIN FE) 1 MG-10 MCG / 10 MCG tablet Take 1 tablet by mouth daily 7/1/21   Chelsea Marino,        REVIEW OF SYSTEMS    (2-9 systems for level 4, 10 or more forlevel 5)      Review of Systems   Constitutional: Positive for activity change, chills, fatigue and fever. HENT: Positive for congestion and sinus pain. Eyes: Negative for pain and visual disturbance. Respiratory: Positive for cough, chest tightness and shortness of breath. Cardiovascular: Positive for chest pain. Gastrointestinal: Negative for abdominal pain, diarrhea, nausea and vomiting. Genitourinary: Negative for difficulty urinating, dysuria and hematuria. Musculoskeletal: Positive for arthralgias, back pain and myalgias. Skin: Negative for rash and wound. Neurological: Negative for dizziness, light-headedness and headaches. Psychiatric/Behavioral: Negative for agitation and confusion.        PHYSICAL EXAM   (up to 7 for level 4, 8 or more forlevel 5)      ED TRIAGE VITALS BP: (!) 163/106, Temp: (!) 102 °F (38.9 °C), Heart Rate: (!) 123, Resp: 20, SpO2: 96 %    Vitals:    07/01/22 2044 07/01/22 2130 07/01/22 2215 07/01/22 2300   BP: (!) 163/106 (!) 150/107 (!) 162/108 (!) 158/92   Pulse: (!) 123 (!) 108 (!) 114 (!) 111   Resp: 20 19 19 19   Temp: (!) 102 °F (38.9 °C)  (!) 100.7 °F (38.2 °C)    TempSrc: Oral  Oral    SpO2: 96% 96% 96% 94%   Weight: (!) 440 lb (199.6 kg)      Height: 5' 6\" (1.676 m)            Physical Exam  Vitals and nursing note reviewed. Constitutional:       General: She is not in acute distress. Appearance: Normal appearance. She is obese. She is ill-appearing. She is not toxic-appearing or diaphoretic. HENT:      Head: Normocephalic and atraumatic. Nose: Nose normal.      Mouth/Throat:      Mouth: Mucous membranes are moist.   Eyes:      Extraocular Movements: Extraocular movements intact. Pupils: Pupils are equal, round, and reactive to light. Cardiovascular:      Rate and Rhythm: Regular rhythm. Tachycardia present. Pulses: Normal pulses. Heart sounds: Normal heart sounds. Pulmonary:      Effort: Pulmonary effort is normal.      Breath sounds: Decreased breath sounds present. No wheezing, rhonchi or rales. Chest:      Chest wall: No tenderness. Abdominal:      General: Abdomen is flat. Palpations: Abdomen is soft. Musculoskeletal:         General: Normal range of motion. Cervical back: Normal range of motion. Skin:     General: Skin is warm and dry. Capillary Refill: Capillary refill takes less than 2 seconds. Neurological:      General: No focal deficit present. Mental Status: She is alert and oriented to person, place, and time.    Psychiatric:         Mood and Affect: Mood normal.         Behavior: Behavior normal.           DIFFERENTIAL  DIAGNOSIS     PLAN (LABS / IMAGING / EKG):  Orders Placed This Encounter   Procedures    Culture, Urine    COVID-19, Rapid    RAPID INFLUENZA A/B ANTIGENS    XR CHEST PORTABLE    CBC with Auto Differential    Comprehensive Metabolic Panel w/ Reflex to MG    Lipase    Troponin    Brain Natriuretic Peptide    Protime-INR    APTT    C-Reactive Protein    Sedimentation Rate    D-Dimer, Quantitative    Urinalysis with Microscopic    HCG Qualitative, Serum    Lactate, Sepsis    Blood Gas, Venous    Vital signs    Initiate ED RT Aerosol protocol    EKG 12 Lead       MEDICATIONS ORDERED:  ED Medication Orders (From admission, onward)    Start Ordered     Status Ordering Provider    07/01/22 2115 07/01/22 2112  0.9 % sodium chloride IV bolus 5,988 mL  ONCE         Last MAR action: Stopped - by Faylene Bumpers on 07/01/22 at [address]    07/01/22 2115 07/01/22 2112  ibuprofen (ADVIL;MOTRIN) tablet 400 mg  ONCE         Last MAR action: Given - by Faylene Bumpers on 07/01/22 at [address] JOAN    07/01/22 2115 07/01/22 2112  acetaminophen (TYLENOL) tablet 1,000 mg  ONCE         Last MAR action: Given - by Faylene Bumpers on 07/01/22 at [address]              DIAGNOSTIC RESULTS / EMERGENCY DEPARTMENT COURSE / MDM     IMPRESSION & INITIAL PLAN:  We will base patient presenting with shortness of breath fatigue cough and home positive COVID test.  She is had COVID previously. She says she was hospitalized for 2 weeks previously. She is ill-appearing tachycardic febrile. She is complaining of pleuritic chest pain with decreased air movement. Well score of 1.5. Will order D-dimer and evaluate to see whether or not we need to order CT PE study. Years criteria used, D-dimer less than 1, no CT PE study ordered. Fever much improved after Tylenol and Motrin therapy. We will provide the patient with scheduled Tylenol and Motrin for fever and symptom control. Additionally she had an albuterol inhaler from the last time she had COVID that she said helped her breathing, we will refill that prescription for her. Strict return precautions. March test passed. EKG shows sinus tachycardia ventricular rate 121, T wave flattening in lead V1 otherwise no significant ST changes or T wave inversions.     LABS:  Results for orders placed or performed during the hospital encounter of 07/01/22   COVID-19, Rapid    Specimen: Nasopharyngeal Swab   Result Value Ref Range    Specimen Description . NASOPHARYNGEAL SWAB     SARS-CoV-2, Rapid DETECTED (A) Not Detected   RAPID INFLUENZA A/B ANTIGENS    Specimen: Nasopharyngeal   Result Value Ref Range    Flu A Antigen NEGATIVE NEGATIVE    Flu B Antigen NEGATIVE NEGATIVE   CBC with Auto Differential   Result Value Ref Range    WBC 8.0 3.5 - 11.3 k/uL    RBC 4.55 3.95 - 5.11 m/uL    Hemoglobin 12.2 11.9 - 15.1 g/dL    Hematocrit 37.7 36.3 - 47.1 %    MCV 82.9 82.6 - 102.9 fL    MCH 26.8 25.2 - 33.5 pg    MCHC 32.4 28.4 - 34.8 g/dL    RDW 17.2 (H) 11.8 - 14.4 %    Platelets 386 313 - 581 k/uL    MPV 10.9 8.1 - 13.5 fL    NRBC Automated 0.0 0.0 per 100 WBC    Seg Neutrophils 73 (H) 36 - 65 %    Lymphocytes 14 (L) 24 - 43 %    Monocytes 11 3 - 12 %    Eosinophils % 1 1 - 4 %    Basophils 1 0 - 2 %    Immature Granulocytes 0 0 %    Segs Absolute 5.92 1.50 - 8.10 k/uL    Absolute Lymph # 1.08 (L) 1.10 - 3.70 k/uL    Absolute Mono # 0.87 0.10 - 1.20 k/uL    Absolute Eos # 0.08 0.00 - 0.44 k/uL    Basophils Absolute 0.04 0.00 - 0.20 k/uL    Absolute Immature Granulocyte <0.03 0.00 - 0.30 k/uL    RBC Morphology ANISOCYTOSIS PRESENT    Comprehensive Metabolic Panel w/ Reflex to MG   Result Value Ref Range    Glucose 103 (H) 70 - 99 mg/dL    BUN 9 6 - 20 mg/dL    CREATININE 0.85 0.50 - 0.90 mg/dL    Calcium 8.9 8.6 - 10.4 mg/dL    Sodium 135 135 - 144 mmol/L    Potassium 3.9 3.7 - 5.3 mmol/L    Chloride 100 98 - 107 mmol/L    CO2 25 20 - 31 mmol/L    Anion Gap 10 9 - 17 mmol/L    Alkaline Phosphatase 65 35 - 104 U/L    ALT 20 5 - 33 U/L    AST 21 <32 U/L    Total Bilirubin 0.19 (L) 0.3 - 1.2 mg/dL    Total Protein 7.4 6.4 - 8.3 g/dL    Albumin 3.8 3.5 - 5.2 g/dL    Albumin/Globulin Ratio 1.1 1.0 - 2.5    GFR Non-African American >60 >60 mL/min    GFR African American >60 >60 mL/min    GFR Comment         Lipase   Result Value Ref Range Lipase 32 13 - 60 U/L   Troponin   Result Value Ref Range    Troponin, High Sensitivity 8 0 - 14 ng/L   Brain Natriuretic Peptide   Result Value Ref Range    Pro-BNP 24 <300 pg/mL   Protime-INR   Result Value Ref Range    Protime 10.0 9.1 - 12.3 sec    INR 0.9    APTT   Result Value Ref Range    PTT 23.3 20.5 - 30.5 sec   C-Reactive Protein   Result Value Ref Range    CRP 15.0 (H) 0.0 - 5.0 mg/L   Sedimentation Rate   Result Value Ref Range    Sed Rate 31 (H) 0 - 20 mm/Hr   D-Dimer, Quantitative   Result Value Ref Range    D-Dimer, Quant 0.56 mg/L FEU   HCG Qualitative, Serum   Result Value Ref Range    hCG Qual NEGATIVE NEGATIVE   Lactate, Sepsis   Result Value Ref Range    Lactic Acid, Sepsis, Whole Blood 1.5 0.5 - 1.9 mmol/L   Blood Gas, Venous   Result Value Ref Range    pH, Isaak 7.379 7.320 - 7.420    pCO2, Isaak 42.2 39 - 55    pO2, Isaak 41.0 30 - 50    HCO3, Venous 24.4 24 - 30 mmol/L    Negative Base Excess, Isaak 0.3 0.0 - 2.0 mmol/L    O2 Sat, Isaak 75.8 60.0 - 85.0 %    Carboxyhemoglobin 2.5 0 - 5 %    Pt Temp 37.0     FIO2 INFORMATION NOT PROVIDED        RADIOLOGY:  XR CHEST PORTABLE   Final Result   No acute cardiopulmonary disease. Portable study was limited by obesity and low lung volumes.              CONSULTS:  None    CRITICAL CARE:  See attending physician note    FINAL IMPRESSION      1. COVID-19          DISPOSITION / PLAN     DISPOSITION Decision To Discharge 07/01/2022 11:17:24 PM      PATIENT REFERRED TO:  Dex Ayala, APRN - [address] [facility] [address]  [phone]    In 2 days      [facility]  [address] [address]  [phone]    As needed, If symptoms worsen      DISCHARGE MEDICATIONS:  Discharge Medication List as of 7/1/2022 11:18 PM      START taking these medications    Details   acetaminophen (TYLENOL) 500 MG tablet Take 2 tablets by mouth 3 times daily, Disp-30 tablet, R-0Print           Discharge Medication List as of 7/1/2022 11:18 PM CONTINUE these medications which have CHANGED    Details   albuterol sulfate HFA (VENTOLIN HFA) 108 (90 Base) MCG/ACT inhaler Inhale 2 puffs into the lungs every 4 hours as needed for Wheezing, Disp-18 g, R-0Print      ibuprofen (IBU) 600 MG tablet Take 1 tablet by mouth every 6 hours as needed for Pain, Disp-30 tablet, R-0Print              Chuck Hernandez MD  Emergency Medicine Resident    (Please note that portions of this note were completed with a voice recognition program.  Efforts were made to edit the dictations but occasionally words are mis-transcribed.)       Chuck Hernandez MD  Resident  07/02/22 7506

## 2022-07-05 LAB
EKG ATRIAL RATE: 121 BPM
EKG P AXIS: 61 DEGREES
EKG P-R INTERVAL: 140 MS
EKG Q-T INTERVAL: 308 MS
EKG QRS DURATION: 78 MS
EKG QTC CALCULATION (BAZETT): 437 MS
EKG R AXIS: -9 DEGREES
EKG T AXIS: 25 DEGREES
EKG VENTRICULAR RATE: 121 BPM

## 2022-07-25 ENCOUNTER — HOSPITAL ENCOUNTER (EMERGENCY)
Age: 32
Discharge: HOME | End: 2022-07-25
Attending: EMERGENCY MEDICINE
Payer: MEDICARE

## 2022-07-25 ENCOUNTER — APPOINTMENT (OUTPATIENT)
Dept: GENERAL RADIOLOGY | Age: 32
End: 2022-07-25
Payer: MEDICARE

## 2022-07-25 VITALS
DIASTOLIC BLOOD PRESSURE: 50 MMHG | HEIGHT: 66 IN | OXYGEN SATURATION: 98 % | TEMPERATURE: 97.4 F | HEART RATE: 86 BPM | RESPIRATION RATE: 18 BRPM | BODY MASS INDEX: 47.09 KG/M2 | WEIGHT: 293 LBS | SYSTOLIC BLOOD PRESSURE: 160 MMHG

## 2022-07-25 DIAGNOSIS — S92.502A CLOSED FRACTURE OF PHALANX OF LEFT FOURTH TOE, INITIAL ENCOUNTER: ICD-10-CM

## 2022-07-25 DIAGNOSIS — M79.675 TOE PAIN, LEFT: Primary | ICD-10-CM

## 2022-07-25 PROCEDURE — 6370000000 HC RX 637 (ALT 250 FOR IP): Performed by: HEALTH CARE PROVIDER

## 2022-07-25 PROCEDURE — 73630 X-RAY EXAM OF FOOT: CPT

## 2022-07-25 PROCEDURE — 99283 EMERGENCY DEPT VISIT LOW MDM: CPT

## 2022-07-25 RX ORDER — IBUPROFEN 800 MG/1
800 TABLET ORAL ONCE
Status: COMPLETED | OUTPATIENT
Start: 2022-07-25 | End: 2022-07-25

## 2022-07-25 RX ORDER — ACETAMINOPHEN 500 MG
1000 TABLET ORAL EVERY 6 HOURS PRN
Qty: 20 TABLET | Refills: 0 | Status: SHIPPED | OUTPATIENT
Start: 2022-07-25

## 2022-07-25 RX ORDER — IBUPROFEN 800 MG/1
800 TABLET ORAL EVERY 8 HOURS PRN
Qty: 20 TABLET | Refills: 0 | Status: SHIPPED | OUTPATIENT
Start: 2022-07-25

## 2022-07-25 RX ORDER — ACETAMINOPHEN 500 MG
1000 TABLET ORAL ONCE
Status: COMPLETED | OUTPATIENT
Start: 2022-07-25 | End: 2022-07-25

## 2022-07-25 RX ADMIN — ACETAMINOPHEN 1000 MG: 500 TABLET ORAL at 19:40

## 2022-07-25 RX ADMIN — IBUPROFEN 800 MG: 800 TABLET, FILM COATED ORAL at 17:55

## 2022-07-25 ASSESSMENT — PAIN SCALES - GENERAL
PAINLEVEL_OUTOF10: 8
PAINLEVEL_OUTOF10: 7
PAINLEVEL_OUTOF10: 8

## 2022-07-25 ASSESSMENT — PAIN - FUNCTIONAL ASSESSMENT: PAIN_FUNCTIONAL_ASSESSMENT: 0-10

## 2022-07-25 ASSESSMENT — PAIN DESCRIPTION - LOCATION: LOCATION: TOE (COMMENT WHICH ONE)

## 2022-07-25 ASSESSMENT — PAIN DESCRIPTION - ORIENTATION: ORIENTATION: LEFT

## 2022-07-25 NOTE — DISCHARGE INSTRUCTIONS
You were seen in the emergency department for for pain in your left fourth toe. X-ray revealed that there is a fracture in this toe. Please keep your surgical shoe on at all times while bearing weight on your foot. You may take it off when you are laying down in bed. Please make an appointment with the podiatry clinic for 1 week from today. Call the number above to make an appointment. You may use Tylenol and ibuprofen in alternating fashion for pain control. You experience worsening pain, weakness, difficulty walking, fever, chills, redness of the toe, discharge of pus from the toe, or any other symptom you find concerning, please return the emergency department immediately for reevaluation.

## 2022-07-25 NOTE — ED PROVIDER NOTES
[address] [facility]     Emergency Department     Faculty Attestation    I performed a history and physical examination of the patient and discussed management with the resident. I reviewed the resident´s note and agree with the documented findings and plan of care. Any areas of disagreement are noted on the chart. I was personally present for the key portions of any procedures. I have documented in the chart those procedures where I was not present during the key portions. I have reviewed the emergency nurses triage note. I agree with the chief complaint, past medical history, past surgical history, allergies, medications, social and family history as documented unless otherwise noted below. For Physician Assistant/ Nurse Practitioner cases/documentation I have personally evaluated this patient and have completed at least one if not all key elements of the E/M (history, physical exam, and MDM). Additional findings are as noted. Pain left fourth toe, no deformity.      Abdirahman Strauss MD  07/25/22 1800

## 2022-07-29 NOTE — ED PROVIDER NOTES
[facility] ED  Emergency Department Encounter  Emergency Medicine Resident     Pt Name: Etta Santo  MRN: [id number]  [place] 1990  Date of evaluation: 22  PCP:  [place]       Chief Complaint   Patient presents with    Toe Injury     Left fourth toe, stubbed It        HISTORY OFPRESENT ILLNESS  (Location/Symptom, Timing/Onset, Context/Setting, Quality, Duration, Modifying Becca Doniphan.)      Etta Santo is a 27 y.o. female who presents with her left fourth toe. Patient states that she inadvertently kicked one of her stairs while trying to ascend while \"chasing after her daughter. \"  Pain is constant, throbbing and 7/10 in severity. Localized to the left fourth toe without radiation. Patient does report significant exacerbation of pain with ambulation. Alleviated with sitting down. Patient was brought back to the examination room from triage in a wheelchair due to level of pain with ambulation. PAST MEDICAL / SURGICAL / SOCIAL / FAMILY HISTORY      has a past medical history of Class 3 severe obesity due to excess calories without serious comorbidity with body mass index (BMI) of 60.0 to 69.9 in adult [facility]), COVID-19 virus infection, Depression, Essential hypertension, and LASHAY (obstructive sleep apnea).     has a past surgical history that includes [place] tooth extraction (Left, ). Social History     Socioeconomic History    Marital status: Single     Spouse name: Not on file    Number of children: Not on file    Years of education: Not on file    Highest education level: Not on file   Occupational History    Not on file   Tobacco Use    Smoking status: Former     Types: Cigars     Start date:      Quit date: 2021     Years since quittin.9    Smokeless tobacco: Never    Tobacco comments:     1-2 black and mild   Vaping Use    Vaping Use: Never used   Substance and Sexual Activity    Alcohol use:  Yes Comment: Occasionally     Drug use: Not Currently     Types: Marijuana Ria Lux)    Sexual activity: Yes     Partners: Male     Birth control/protection: I.U.D. Other Topics Concern    Not on file   Social History Narrative    Not on file     Social Determinants of Health     Financial Resource Strain: Low Risk     Difficulty of Paying Living Expenses: Not hard at all   Food Insecurity: No Food Insecurity    Worried About [address] in the Last Year: Never true    [address] in the Last Year: Never true   Transportation Needs: No Transportation Needs    Lack of Transportation (Medical): No    Lack of Transportation (Non-Medical): No   Physical Activity: Not on file   Stress: Not on file   Social Connections: Not on file   Intimate Partner Violence: Not on file   Housing Stability: Not on file       Family History   Problem Relation Age of Onset    Diabetes Mother         Allergies:  Patient has no known allergies. Home Medications:  Prior to Admission medications    Medication Sig Start Date End Date Taking? Authorizing Provider   acetaminophen (TYLENOL) 500 MG tablet Take 2 tablets by mouth every 6 hours as needed for Pain 7/25/22  Yes Roland Lozano MD   ibuprofen (ADVIL;MOTRIN) 800 MG tablet Take 1 tablet by mouth every 8 hours as needed for Pain 7/25/22  Yes Roland Lozano MD   albuterol sulfate HFA (VENTOLIN HFA) 108 (90 Base) MCG/ACT inhaler Inhale 2 puffs into the lungs every 4 hours as needed for Wheezing 7/1/22   Remy Soriano MD   lisinopril (PRINIVIL;ZESTRIL) 10 MG tablet Take 1 tablet by mouth daily 8/28/21   Aure Liu MD   norethindrone-ethinyl estradiol-Fe (LO LOESTRIN FE) 1 MG-10 MCG / 10 MCG tablet Take 1 tablet by mouth daily 7/1/21   Chelsea Marino DO       REVIEW OFSYSTEMS    (2-9 systems for level 4, 10 or more for level 5)      Review of Systems   Musculoskeletal:  Positive for arthralgias. Negative for myalgias. Skin:  Negative for pallor.    Neurological: Negative for weakness and numbness. Hematological:  Does not bruise/bleed easily. PHYSICAL EXAM   (up to 7 for level 4, 8 or more forlevel 5)      INITIAL VITALS:   ED Triage Vitals [07/25/22 1749]   BP Temp Temp Source Heart Rate Resp SpO2 Height Weight   (!) 160/50 97.4 °F (36.3 °C) Oral 86 18 98 % 5' 6\" (1.676 m) (!) 450 lb (204.1 kg)       Physical Exam  Vitals reviewed. Constitutional:       Appearance: Normal appearance. HENT:      Head: Normocephalic and atraumatic. Musculoskeletal:         General: Swelling and tenderness present. Comments: Patient does have significant tenderness to palpation and swelling over the left fourth toe. Skin:     General: Skin is warm and dry. Capillary Refill: Capillary refill takes less than 2 seconds. Neurological:      Mental Status: She is alert. Sensory: No sensory deficit. Motor: No weakness. DIFFERENTIAL  DIAGNOSIS     PLAN (LABS / IMAGING / EKG):  Orders Placed This Encounter   Procedures    XR FOOT LEFT (MIN 3 VIEWS)    [facility] Post Op Shoe, Unisex - Left; MD (M7.5-9/F8.5-10)       MEDICATIONS ORDERED:  Orders Placed This Encounter   Medications    ibuprofen (ADVIL;MOTRIN) tablet 800 mg    acetaminophen (TYLENOL) tablet 1,000 mg    acetaminophen (TYLENOL) 500 MG tablet     Sig: Take 2 tablets by mouth every 6 hours as needed for Pain     Dispense:  20 tablet     Refill:  0    ibuprofen (ADVIL;MOTRIN) 800 MG tablet     Sig: Take 1 tablet by mouth every 8 hours as needed for Pain     Dispense:  20 tablet     Refill:  0       DDX: Left fourth toe fracture, left fourth toe contusion, tendon injury    Initial MDM/Plan: 27 y.o. female who presents with feeling of the left fourth toe after striking it against a step. Given level of tenderness, will obtain x-ray to evaluate for acute fracture. Pain controlled with on ibuprofen.     DIAGNOSTIC RESULTS / EMERGENCYDEPARTMENT COURSE / MDM     LABS:  Labs Reviewed

## 2022-11-07 ENCOUNTER — OFFICE VISIT (OUTPATIENT)
Dept: OBGYN CLINIC | Age: 32
End: 2022-11-07
Payer: MEDICARE

## 2022-11-07 ENCOUNTER — HOSPITAL ENCOUNTER (OUTPATIENT)
Age: 32
Setting detail: SPECIMEN
Discharge: HOME | End: 2022-11-07

## 2022-11-07 VITALS
WEIGHT: 293 LBS | BODY MASS INDEX: 47.09 KG/M2 | HEIGHT: 66 IN | DIASTOLIC BLOOD PRESSURE: 76 MMHG | SYSTOLIC BLOOD PRESSURE: 122 MMHG

## 2022-11-07 DIAGNOSIS — N89.8 VAGINAL ODOR: ICD-10-CM

## 2022-11-07 DIAGNOSIS — Z11.51 SPECIAL SCREENING EXAMINATION FOR HUMAN PAPILLOMAVIRUS (HPV): ICD-10-CM

## 2022-11-07 DIAGNOSIS — Z01.419 WELL WOMAN EXAM WITH ROUTINE GYNECOLOGICAL EXAM: Primary | ICD-10-CM

## 2022-11-07 DIAGNOSIS — N92.1 MENORRHAGIA WITH IRREGULAR CYCLE: ICD-10-CM

## 2022-11-07 DIAGNOSIS — R39.9 UTI SYMPTOMS: ICD-10-CM

## 2022-11-07 LAB
BACTERIA: ABNORMAL
BILIRUBIN URINE: NEGATIVE
CASTS UA: ABNORMAL /LPF (ref 0–2)
CASTS UA: ABNORMAL /LPF (ref 0–2)
COLOR: YELLOW
EPITHELIAL CELLS UA: ABNORMAL /HPF (ref 0–5)
GLUCOSE URINE: NEGATIVE
KETONES, URINE: NEGATIVE
LEUKOCYTE ESTERASE, URINE: NEGATIVE
MUCUS: ABNORMAL
NITRITE, URINE: NEGATIVE
PH UA: 6 (ref 5–8)
PROTEIN UA: NEGATIVE
RBC UA: ABNORMAL /HPF (ref 0–2)
SPECIFIC GRAVITY UA: 1.02 (ref 1–1.03)
TURBIDITY: CLEAR
URINE HGB: ABNORMAL
UROBILINOGEN, URINE: NORMAL
WBC UA: ABNORMAL /HPF (ref 0–5)

## 2022-11-07 PROCEDURE — 3078F DIAST BP <80 MM HG: CPT | Performed by: NURSE PRACTITIONER

## 2022-11-07 PROCEDURE — 3074F SYST BP LT 130 MM HG: CPT | Performed by: NURSE PRACTITIONER

## 2022-11-07 PROCEDURE — G8484 FLU IMMUNIZE NO ADMIN: HCPCS | Performed by: NURSE PRACTITIONER

## 2022-11-07 PROCEDURE — 99395 PREV VISIT EST AGE 18-39: CPT | Performed by: NURSE PRACTITIONER

## 2022-11-07 RX ORDER — IBUPROFEN 600 MG/1
TABLET ORAL
COMMUNITY
Start: 2022-08-03 | End: 2022-11-07 | Stop reason: CLARIF

## 2022-11-07 NOTE — PROGRESS NOTES
History and Physical  [address], [address] [address], [address] [facility] [address] [phone]   Fax [phone]  Jason Vasquez  2022              34 y.o. Chief Complaint   Patient presents with    Annual Exam       Patient's last menstrual period was 2022 (approximate). Primary Care Physician: SHEILA Jackson - CNP    The patient was seen and examined. She has no chief complaint today and is here for her annual exam.  Her bowels are regular. There are no voiding complaints. She denies any bloating. She denies vaginal discharge and was counseled on STD's and the need for barrier contraception. HPI : Jason Vasquez is a 34 y.o. female [id number]    Annual exam  Complaining of heavy irregular menses. Periods occur every 3 months, lasting 1-2 weeks long. Hx of thickened endometrial lining last year. Had follow up with Dr Markos Moctezuma and was to repeat ultrasound in 3 months. Took OCPs for 1-2 months 1 year ago but stoppped, had hard time remembering to take them. Complaining of vaginal odor - not currently sexually active. Declines STD testing. Patient complaining of pain in her side while taking Ibuprofen. Wants labwork to check kidneys.      ________________________________________________________________________  OB History    Para Term  AB Living   4 3 3 0 1 2   SAB IAB Ectopic Molar Multiple Live Births   1 0 0 0 0 2      # Outcome Date GA Lbr Rodger/2nd Weight Sex Delivery Anes PTL Lv   4 SAB 16 10w0d    OTHER   ND      Birth Comments: Cytotec    3 Term 09 39w3d  7 lb 8 oz (3.402 kg) F Vag-Spont EPI  REED      Name: Mike Rollins   2 Term 08 40w0d  6 lb 15 oz (3.147 kg) F Vag-Spont EPI N REED      Birth Comments: second degree lac      Name: Jhoana Shell: 9  Apgar5: 9   1 Term               Obstetric Comments   Same FOB     Past Medical History:   Diagnosis Date    Class 3 severe obesity due to excess calories without serious comorbidity with body mass index (BMI) of 60.0 to 69.9 in adult [facility])     COVID-19 virus infection     Depression 2017    Essential hypertension     LASHAY (obstructive sleep apnea)                                                                    Past Surgical History:   Procedure Laterality Date    WISDOM TOOTH EXTRACTION Left 2014     Family History   Problem Relation Age of Onset    Diabetes Mother      Social History     Socioeconomic History    Marital status: Single     Spouse name: Not on file    Number of children: Not on file    Years of education: Not on file    Highest education level: Not on file   Occupational History    Not on file   Tobacco Use    Smoking status: Former     Types: Cigars     Start date:      Quit date: 2021     Years since quittin.1    Smokeless tobacco: Never    Tobacco comments:     1-2 black and mild   Vaping Use    Vaping Use: Never used   Substance and Sexual Activity    Alcohol use: Yes     Comment: Occasionally     Drug use: Not Currently     Types: Marijuana [place])    Sexual activity: Yes     Partners: Male     Birth control/protection: I.U.D.    Other Topics Concern    Not on file   Social History Narrative    Not on file     Social Determinants of Health     Financial Resource Strain: Not on file   Food Insecurity: Not on file   Transportation Needs: Not on file   Physical Activity: Not on file   Stress: Not on file   Social Connections: Not on file   Intimate Partner Violence: Not on file   Housing Stability: Not on file       MEDICATIONS:  Current Outpatient Medications   Medication Sig Dispense Refill    acetaminophen (TYLENOL) 500 MG tablet Take 2 tablets by mouth every 6 hours as needed for Pain 20 tablet 0    ibuprofen (ADVIL;MOTRIN) 800 MG tablet Take 1 tablet by mouth every 8 hours as needed for Pain 20 tablet 0    albuterol sulfate HFA (VENTOLIN HFA) 108 (90 Base) MCG/ACT inhaler Inhale 2 puffs into the lungs every 4 hours as needed for Wheezing 18 g 0    lisinopril (PRINIVIL;ZESTRIL) 10 MG tablet Take 1 tablet by mouth daily 30 tablet 3    norethindrone-ethinyl estradiol-Fe (LO LOESTRIN FE) 1 MG-10 MCG / 10 MCG tablet Take 1 tablet by mouth daily 28 tablet 3     No current facility-administered medications for this visit. ALLERGIES:  Allergies as of 11/07/2022    (No Known Allergies)       Symptoms of decreased mood absent  Symptoms of anhedonia absent    **If either question is answered in a  positive fashion then complete the PHQ9 Scoring Evaluation and make the appropriate referral**      Immunization status: stated as current, but no records available. Gynecologic History:  Menarche: 6 yo  Menopause at [address] [place] yo     Patient's last menstrual period was 11/01/2022 (approximate). Sexually Active: No not right now    STD History: Yes      Permanent Sterilization: No   Reversible Birth Control: No        Hormone Replacement Exposure: No      Genetic Qualified Family History of Breast, Ovarian , Colon or Uterine Cancer: No     If YES see scanned worksheet. Preventative Health Testing:    Health Maintenance:  Health Maintenance Due   Topic Date Due    COVID-19 Vaccine (1) Never done    Depression Monitoring  06/21/2022    Flu vaccine (1) 08/01/2022       Date of Last Pap Smear: 7/1/2021 neg/neg  Abnormal Pap Smear History: yes  Colposcopy History:   Date of Last Mammogram: NA  Date of Last Colonoscopy:   Date of Last Bone Density:      ________________________________________________________________________        REVIEW OF SYSTEMS:    yes   A minimum of an eleven point review of systems was completed. Review Of Systems (11 point):  Constitutional: No fever, chills or malaise;  No weight change or fatigue  Head and Eyes: No vision changes, Headache, Dizziness or trauma in last 12 months  ENT ROS: No hearing, Tinnitis, sinus or taste problems  Hematological and Lymphatic ROS:No Lymphoma, Von Willebrand's, Hemophillia or Bleeding History  Psych ROS: No Depression, Homicidal thoughts,suicidal thoughts, or anxiety  Breast ROS: No breast abnormalities or lumps  Respiratory ROS: No SOB, Pneumoniae,Cough, or Pulmonary Embolism   Cardiovascular ROS: No Chest Pain with Exertion, Palpitations, Syncope, Edema, Arrhythmia. Gastrointestinal ROS: No Indigestion, Heartburn, Nausea, vomiting, Diarrhea, Constipation,or Bowel Changes; No Bloody Stools or melena  Genito-Urinary ROS: No Dysuria, Hematuria or Nocturia. No Urinary Incontinence or Vaginal Discharge. + irregular, heavy menses  Musculoskeletal ROS: No Arthralgia, Arthritis,Gout,Osteoporosis or Rheumatism  Neurological ROS: No CVA, Migraines, Epilepsy, Seizure Hx, or Limb Weakness  Dermatological ROS: No Rash, Itching, Hives, Mole Changes or Cancer                                                                                                                                                                                                                                  PHYSICAL Exam:     Constitutional:  Vitals:    11/07/22 1115   BP: 122/76   Site: Right Upper Arm   Position: Sitting   Cuff Size: Large Adult   Weight: (!) 468 lb (212.3 kg)   Height: 5' 6\" (1.676 m)       Chaperone for Intimate Exam  Chaperone was offered and accepted as part of the rooming process. Chaperone: Alexey Amador Appearance: This  is a well Developed, well Nourished, well groomed female. Her BMI was reviewed. Nutritional decision making was discussed. Skin:  There was a Normal Inspection of the skin without rashes or lesions. There were no rashes. (Papular, Maculopapular, Hives, Pustular, Macular)     There were no lesions (Ulcers, Erythema, Abn. Appearing Nevi)            Lymphatic:  No Lymph Nodes were Palpable in the neck , axilla or groin.  0 # Of Lymph Nodes; Location ; Character [Normal]  [Shotty] [Tender] [Enlarged]     Neck and EENT:  The neck was supple.  There were no masses   The thyroid was not enlarged and had no masses. Perrla, EOMI B/L, TMI B/L No Abnormalities. Throat inspected-No exudates or Masses, Nares Patent No Masses        Respiratory: The lungs were auscultated and found to be clear. There were no rales, rhonchi or wheezes. There was a good respiratory effort. Cardiovascular: The heart was in a regular rate and rhythm. . No S3 or S4. There was no murmur appreciated. Location, grade, and radiation are not applicable. Extremities: The patients extremities were without calf tenderness, edema, or varicosities. There was full range of motion in all four extremities. Pulses in all four extremities were appreciated and are 2/4. Abdomen: The abdomen was soft and non-tender. There were good bowel sounds in all quadrants and there was no guarding, rebound or rigidity. On evaluation there was no evidence of hepatosplenomegaly and there was no costal vertebral victorino tenderness bilaterally. No hernias were appreciated. Abdominal Scars: intact    Psych: The patient had a normal Orientation to: Time, Place, Person, and Situation  There is no Mood / Affect changes    Breast:  (Chest)  Patient declines breast exam  Self breast exams were reviewed in detail. Literature was given. Pelvic Exam:  External genitalia: normal general appearance  Urinary system: urethral meatus normal  Vaginal: normal mucosa without prolapse or lesions  Cervix: normal appearance  Adnexa: normal bimanual exam  Uterus: normal single, nontender    Rectal Exam:  exam declined by patient          Musculosk:  Normal Gait and station was noted. Digits were evaluated without abnormal findings. Range of motion, stability and strength were evaluated and found to be appropriate for the patients age. ASSESSMENT:      34 y.o. Annual   Diagnosis Orders   1. Well woman exam with routine gynecological exam  PAP SMEAR      2.  Special screening examination for human papillomavirus (HPV)  PAP SMEAR 3. Vaginal odor  Culture, Genital      4. Menorrhagia with irregular cycle  US PELVIS COMPLETE    US NON OB TRANSVAGINAL    CBC with Auto Differential    HCG, Quantitative, Pregnancy    TSH with Reflex    Protime-INR    APTT    Hemoglobin A1C    Comprehensive Metabolic Panel      5. UTI symptoms  Urinalysis with Reflex to Culture             Chief Complaint   Patient presents with    Annual Exam          Past Medical History:   Diagnosis Date    Class 3 severe obesity due to excess calories without serious comorbidity with body mass index (BMI) of 60.0 to 69.9 in adult [facility])     COVID-19 virus infection     Depression 05/01/2017    Essential hypertension     LASHAY (obstructive sleep apnea)          Patient Active Problem List    Diagnosis Date Noted    Snoring     Witnessed episode of apnea     Iron deficiency anemia due to chronic blood loss 08/23/2021    Sepsis with acute hypoxic respiratory failure without septic shock ([facility] [address] 08/23/2021    Vitamin D deficiency 08/23/2021    Pneumonia due to COVID-19 virus 08/22/2021    Chronic hypertension (no meds) 12/14/2018     Refer to IM      Abnormal uterine bleeding 12/07/2018    Marijuana abuse 12/07/2018    Secondary amenorrhea 06/07/2017    Depression 05/01/2017    Tobacco abuse 05/06/2016     Formatting of this note might be different from the original.  5/2016 Discussed cessation, risks of use on health, tobacco cessation aids   and tobacco cessation programs.       Seasonal allergies 01/15/2015    Morbid obesity ([facility] [address] 09/04/2012    Contraceptive surveillance 12/11/2006    Dysplasia of cervix 09/06/2006     Formatting of this note might be different from the original.  8/2006 PAP with ASCUS +high risk  9/2006 colpo  12/06 pap BENITO I, LSIL, HPV  6/07 pap negative  9/07 pap negative  12/07 pap negative  5/2009 pap negative  4/2010 pap negative      Attention deficit hyperactivity disorder (ADHD) 10/04/2000     Formatting of this note might be different from the original.  Attention deficit disorder with hyperactivity            Hereditary Breast, Ovarian, Colon and Uterine Cancer screening Done. Tobacco & Secondary smoke risks reviewed; instructed on cessation and avoidance      Counseling Completed:  Preventative Health Recommendations and Follow up. The patient was informed of the recommended preventative health recommendations. 1. Annuals every year; Cytology collections per prevailing guidelines. 2. Mammograms begin every year at 35 yo if no abnormalities are found and no family history. 3. Bone density studies every 2-3 years. Begin at 73 yo. If no fracture history or osteoporosis family history. (significant). 4. Colonoscopy begin at 38 yo. Repeat every ten years if negative and no family history. 5. Calcium of 7589-7267 mg/day in split dosing  6. Vitamin D 400-800 IU/day  7. All other preventative health recommendations will be managed by the patients Primary care physician. PLAN:  Return in about 4 weeks (around 12/5/2022) for physician/ heavy menses. Pelvic ultrasound ordered. Genital culture collected. Declines STI testing. Pap smear obtained. UA C&S obtained. Repeat Annual every 1 year  Cervical Cytology Evaluation begins at 24years old. If Negative Cytology, Follow-up screening per current guidelines. Mammograms every 1 year. If 35 yo and last mammogram was negative. Calcium and Vitamin D dosing reviewed. Colonoscopy screening reviewed as well as onset for bone density testing. Birth control and barrier recommendations discussed. STD counseling and prevention reviewed. Gardisil counseling completed for all patients 10-35 yo. Routine health maintenance per patients PCP.   Orders Placed This Encounter   Procedures    Culture, Genital     Standing Status:   Future     Standing Expiration Date:   11/7/2023    US PELVIS COMPLETE     Standing Status:   Future     Standing Expiration Date:   2/7/2023     Order Specific Question:   Reason for exam:     Answer:   heavy menses    US NON OB TRANSVAGINAL     Standing Status:   Future     Standing Expiration Date:   2023     Order Specific Question:   Reason for exam:     Answer:   heavy menses    PAP SMEAR     Patient History:    Patient's last menstrual period was 2022 (approximate). OBGYN Status: Irregular periods  Past Surgical History:  2014: WISDOM TOOTH EXTRACTION; Left  Medications/Contraceptives Affecting Cytology     Combination Contraceptives - Oral Disp Start End     norethindrone-ethinyl estradiol-Fe (LO LOESTRIN FE) 1 MG-10 MCG / 10 MCG   tablet    28 tablet 2021     Sig: Take 1 tablet by mouth daily    Route: Oral     Problem List       Edg Problems Affecting Cytology    Dysplasia of cervix    Overview Signed 2021 10:11 AM by Aris Yang of this note might be different from the original.  2006 PAP with ASCUS +high risk  2006 colpo   pap BENITO I, LSIL, HPV   pap negative   pap negative   pap negative  2009 pap negative  2010 pap negative      Social History    Tobacco Use      Smoking status: Former        Types: Cigars        Start date:         Quit date: 2021        Years since quittin.1      Smokeless tobacco: Never      Tobacco comments: 1-2 black and mild       Standing Status:   Future     Standing Expiration Date:   2023     Order Specific Question:   Collection Type     Answer: Thin Prep     Order Specific Question:   Prior Abnormal Pap Test     Answer:   No     Order Specific Question:   Screening or Diagnostic     Answer:   Screening     Order Specific Question:   HPV Requested?      Answer:   Yes     Order Specific Question:   High Risk Patient     Answer:   N/A    CBC with Auto Differential     Standing Status:   Future     Standing Expiration Date:   2023    HCG, Quantitative, Pregnancy     Standing Status:   Future     Standing Expiration Date:   2023    TSH with Reflex Standing Status:   Future     Standing Expiration Date:   11/7/2023    Protime-INR     Standing Status:   Future     Standing Expiration Date:   11/7/2023     Order Specific Question:   Daily Coumadin Dose? Answer:   N    APTT     Standing Status:   Future     Standing Expiration Date:   11/7/2023     Order Specific Question:   Daily Heparin Dose? Answer:   N    Hemoglobin A1C     Standing Status:   Future     Standing Expiration Date:   11/7/2023    Comprehensive Metabolic Panel     Standing Status:   Future     Standing Expiration Date:   11/7/2023    Urinalysis with Reflex to Culture     Standing Status:   Future     Standing Expiration Date:   11/7/2023     Order Specific Question:   SPECIFY(EX-CATH,MIDSTREAM,CYSTO,ETC)? Answer:   midstream           The patient, Boo Dale is a 34 y.o. female, was seen with a total time spent of 20 minutes for the visit on this date of service by the E/M provider. The time component had both face to face and non face to face time spent in determining the total time component. Counseling and education regarding her diagnosis listed below and her options regarding those diagnoses were also included in determining her time component. Diagnosis Orders   1. Well woman exam with routine gynecological exam  PAP SMEAR      2. Special screening examination for human papillomavirus (HPV)  PAP SMEAR      3. Vaginal odor  Culture, Genital      4. Menorrhagia with irregular cycle  US PELVIS COMPLETE    US NON OB TRANSVAGINAL    CBC with Auto Differential    HCG, Quantitative, Pregnancy    TSH with Reflex    Protime-INR    APTT    Hemoglobin A1C    Comprehensive Metabolic Panel      5. UTI symptoms  Urinalysis with Reflex to Culture           The patient had her preventative health maintenance recommendations and follow-up reviewed with her at the completion of her visit.

## 2022-11-10 ENCOUNTER — TELEPHONE (OUTPATIENT)
Dept: OBGYN CLINIC | Age: 32
End: 2022-11-10

## 2022-11-10 LAB
CULTURE: ABNORMAL
SPECIMEN DESCRIPTION: ABNORMAL

## 2022-11-10 RX ORDER — AMPICILLIN 500 MG/1
500 CAPSULE ORAL 4 TIMES DAILY
Qty: 40 CAPSULE | Refills: 0 | Status: SHIPPED | OUTPATIENT
Start: 2022-11-10 | End: 2022-11-20

## 2022-11-10 NOTE — TELEPHONE ENCOUNTER
Per Mali Walls patient does have a bacterial infection and also blood in her urine, patient needs script for ampicillin 500mg QID x10 days. Patient would like script to be sent to [facility].

## 2022-11-17 LAB — CYTOLOGY REPORT: NORMAL

## 2023-01-14 ENCOUNTER — HOSPITAL ENCOUNTER (EMERGENCY)
Age: 33
Discharge: HOME | End: 2023-01-14
Attending: EMERGENCY MEDICINE
Payer: MEDICARE

## 2023-01-14 ENCOUNTER — APPOINTMENT (OUTPATIENT)
Dept: ULTRASOUND IMAGING | Age: 33
End: 2023-01-14
Payer: MEDICARE

## 2023-01-14 VITALS
OXYGEN SATURATION: 100 % | SYSTOLIC BLOOD PRESSURE: 143 MMHG | RESPIRATION RATE: 16 BRPM | DIASTOLIC BLOOD PRESSURE: 94 MMHG | TEMPERATURE: 98.4 F | HEART RATE: 78 BPM | WEIGHT: 293 LBS | BODY MASS INDEX: 73.28 KG/M2

## 2023-01-14 DIAGNOSIS — N93.8 DYSFUNCTIONAL UTERINE BLEEDING: Primary | ICD-10-CM

## 2023-01-14 DIAGNOSIS — N76.0 BACTERIAL VAGINOSIS: ICD-10-CM

## 2023-01-14 DIAGNOSIS — B96.89 BACTERIAL VAGINOSIS: ICD-10-CM

## 2023-01-14 LAB
ABSOLUTE EOS #: 0.11 K/UL (ref 0–0.44)
ABSOLUTE IMMATURE GRANULOCYTE: 0.03 K/UL (ref 0–0.3)
ABSOLUTE LYMPH #: 2.42 K/UL (ref 1.1–3.7)
ABSOLUTE MONO #: 0.79 K/UL (ref 0.1–1.2)
ANION GAP SERPL CALCULATED.3IONS-SCNC: 9 MMOL/L (ref 9–17)
BASOPHILS # BLD: 0 % (ref 0–2)
BASOPHILS ABSOLUTE: 0.04 K/UL (ref 0–0.2)
BUN BLDV-MCNC: 7 MG/DL (ref 6–20)
CALCIUM SERPL-MCNC: 8.4 MG/DL (ref 8.6–10.4)
CANDIDA SPECIES, DNA PROBE: NEGATIVE
CHLORIDE BLD-SCNC: 107 MMOL/L (ref 98–107)
CO2: 24 MMOL/L (ref 20–31)
CREAT SERPL-MCNC: 0.72 MG/DL (ref 0.5–0.9)
EOSINOPHILS RELATIVE PERCENT: 1 % (ref 1–4)
GARDNERELLA VAGINALIS, DNA PROBE: POSITIVE
GFR SERPL CREATININE-BSD FRML MDRD: >60 ML/MIN/1.73M2
GLUCOSE BLD-MCNC: 105 MG/DL (ref 70–99)
HCG QUALITATIVE: NEGATIVE
HCT VFR BLD CALC: 35.4 % (ref 36.3–47.1)
HEMOGLOBIN: 10.8 G/DL (ref 11.9–15.1)
IMMATURE GRANULOCYTES: 0 %
LYMPHOCYTES # BLD: 24 % (ref 24–43)
MAGNESIUM: 1.9 MG/DL (ref 1.6–2.6)
MCH RBC QN AUTO: 24.9 PG (ref 25.2–33.5)
MCHC RBC AUTO-ENTMCNC: 30.5 G/DL (ref 28.4–34.8)
MCV RBC AUTO: 81.6 FL (ref 82.6–102.9)
MONOCYTES # BLD: 8 % (ref 3–12)
NRBC AUTOMATED: 0 PER 100 WBC
PDW BLD-RTO: 17.5 % (ref 11.8–14.4)
PLATELET # BLD: 292 K/UL (ref 138–453)
PMV BLD AUTO: 10.8 FL (ref 8.1–13.5)
POTASSIUM SERPL-SCNC: 4.1 MMOL/L (ref 3.7–5.3)
RBC # BLD: 4.34 M/UL (ref 3.95–5.11)
RBC # BLD: ABNORMAL 10*6/UL
SEG NEUTROPHILS: 67 % (ref 36–65)
SEGMENTED NEUTROPHILS ABSOLUTE COUNT: 6.77 K/UL (ref 1.5–8.1)
SODIUM BLD-SCNC: 140 MMOL/L (ref 135–144)
SOURCE: ABNORMAL
TRICHOMONAS VAGINALIS DNA: NEGATIVE
WBC # BLD: 10.2 K/UL (ref 3.5–11.3)

## 2023-01-14 PROCEDURE — 99284 EMERGENCY DEPT VISIT MOD MDM: CPT

## 2023-01-14 PROCEDURE — 2580000003 HC RX 258: Performed by: STUDENT IN AN ORGANIZED HEALTH CARE EDUCATION/TRAINING PROGRAM

## 2023-01-14 PROCEDURE — 87510 GARDNER VAG DNA DIR PROBE: CPT

## 2023-01-14 PROCEDURE — 87660 TRICHOMONAS VAGIN DIR PROBE: CPT

## 2023-01-14 PROCEDURE — 80048 BASIC METABOLIC PNL TOTAL CA: CPT

## 2023-01-14 PROCEDURE — 84703 CHORIONIC GONADOTROPIN ASSAY: CPT

## 2023-01-14 PROCEDURE — 83735 ASSAY OF MAGNESIUM: CPT

## 2023-01-14 PROCEDURE — 87480 CANDIDA DNA DIR PROBE: CPT

## 2023-01-14 PROCEDURE — 85025 COMPLETE CBC W/AUTO DIFF WBC: CPT

## 2023-01-14 PROCEDURE — 6370000000 HC RX 637 (ALT 250 FOR IP): Performed by: EMERGENCY MEDICINE

## 2023-01-14 PROCEDURE — 76830 TRANSVAGINAL US NON-OB: CPT

## 2023-01-14 PROCEDURE — 6370000000 HC RX 637 (ALT 250 FOR IP): Performed by: STUDENT IN AN ORGANIZED HEALTH CARE EDUCATION/TRAINING PROGRAM

## 2023-01-14 PROCEDURE — 87491 CHLMYD TRACH DNA AMP PROBE: CPT

## 2023-01-14 PROCEDURE — 87591 N.GONORRHOEAE DNA AMP PROB: CPT

## 2023-01-14 RX ORDER — IBUPROFEN 800 MG/1
800 TABLET ORAL ONCE
Status: COMPLETED | OUTPATIENT
Start: 2023-01-14 | End: 2023-01-14

## 2023-01-14 RX ORDER — FERROUS SULFATE 325(65) MG
325 TABLET ORAL 2 TIMES DAILY
Qty: 180 TABLET | Refills: 3 | Status: SHIPPED | OUTPATIENT
Start: 2023-01-14

## 2023-01-14 RX ORDER — METRONIDAZOLE 500 MG/1
500 TABLET ORAL 2 TIMES DAILY
Qty: 14 TABLET | Refills: 0 | Status: SHIPPED | OUTPATIENT
Start: 2023-01-14 | End: 2023-01-21

## 2023-01-14 RX ORDER — 0.9 % SODIUM CHLORIDE 0.9 %
1000 INTRAVENOUS SOLUTION INTRAVENOUS ONCE
Status: COMPLETED | OUTPATIENT
Start: 2023-01-14 | End: 2023-01-14

## 2023-01-14 RX ORDER — IBUPROFEN 600 MG/1
600 TABLET ORAL EVERY 6 HOURS PRN
Qty: 40 TABLET | Refills: 1 | Status: SHIPPED | OUTPATIENT
Start: 2023-01-14

## 2023-01-14 RX ADMIN — NORETHINDRONE ACETATE 5 MG: 5 TABLET ORAL at 16:22

## 2023-01-14 RX ADMIN — SODIUM CHLORIDE 1000 ML: 9 INJECTION, SOLUTION INTRAVENOUS at 14:37

## 2023-01-14 RX ADMIN — IBUPROFEN 800 MG: 800 TABLET, FILM COATED ORAL at 12:57

## 2023-01-14 ASSESSMENT — ENCOUNTER SYMPTOMS
NAUSEA: 0
DIARRHEA: 0
BACK PAIN: 0
ABDOMINAL PAIN: 0
SINUS PAIN: 0
COUGH: 0
EYE REDNESS: 0
TROUBLE SWALLOWING: 0
VOMITING: 0
SORE THROAT: 0
CONSTIPATION: 0
PHOTOPHOBIA: 0
CHEST TIGHTNESS: 0
SINUS PRESSURE: 0
SHORTNESS OF BREATH: 0
COLOR CHANGE: 0

## 2023-01-14 ASSESSMENT — PAIN DESCRIPTION - DESCRIPTORS: DESCRIPTORS: ACHING;DISCOMFORT

## 2023-01-14 ASSESSMENT — PAIN DESCRIPTION - LOCATION: LOCATION: ABDOMEN

## 2023-01-14 ASSESSMENT — PAIN SCALES - GENERAL: PAINLEVEL_OUTOF10: 7

## 2023-01-14 NOTE — ED PROVIDER NOTES
[facility]     Emergency Department     Faculty Attestation    I performed a history and physical examination of the patient and discussed management with the resident. I reviewed the residents note and agree with the documented findings including all diagnostic interpretations and plan of care. Any areas of disagreement are noted on the chart. I was personally present for the key portions of any procedures. I have documented in the chart those procedures where I was not present during the key portions. I have reviewed the emergency nurses triage note. I agree with the chief complaint, past medical history, past surgical history, allergies, medications, social and family history as documented unless otherwise noted below. Documentation of the HPI, Physical Exam and Medical Decision Making performed by scribvalentine is based on my personal performance of the HPI, PE and MDM. For Physician Assistant/ Nurse Practitioner cases/documentation I have personally evaluated this patient and have completed at least one if not all key elements of the E/M (history, physical exam, and MDM). Additional findings are as noted. Primary Care Physician: Gume Marie, APRN - CNP    History: This is a 34 y.o. female who presents to the Emergency Department with complaint of vaginal bleeding. Vaginal bleeding noted today soaking through 1 superabsorbent tampon per hour. History of abnormal uterine bleeding. Has not had completed work-up from OB/GYN yet. Reports some lightheadedness with this. No nausea or vomiting. Does report some abdominal cramping. Physical:     weight is 454 lb (205.9 kg) (abnormal). Her temperature is 97.8 °F (36.6 °C). Her blood pressure is 145/93 (abnormal) and her pulse is 101 (abnormal).  Her respiration is 18 and oxygen saturation is 98%.    34 y.o. female no acute distress, cardiac exam borderline tachycardic, pulmonary clear bilaterally abdomen soft nontender nondistended, pelvic exam per the resident    Medical Decision Making  Vaginal bleeding, abnormal uterine bleeding. Lower suspicion for miscarriage or ectopic pregnancy. Will check blood counts for anemia, urine and pelvic labs, likely will require discussion with OB/GYN given amount of bleeding reported    Amount and/or Complexity of Data Reviewed  External Data Reviewed: notes. Labs: ordered. Decision-making details documented in ED Course. Radiology: ordered. Discussion of management or test interpretation with external provider(s): OB/GYN    Risk  Prescription drug management.           Larry Layne MD, Gold Ramirez  Attending Emergency Physician        Greg Sandhu MD  01/14/23 [id number]

## 2023-01-14 NOTE — CONSULTS
[facility]    Patient Name: Honey Gomez     Patient : 1990  Room/Bed:   Admission Date/Time: 2023 12:11 PM  Primary Care Physician: SHEILA Granados CNP      CC:   Chief Complaint   Patient presents with    Vaginal Bleeding                HPI: Honey Gomez is a 34 y.o. female [id number] presents to the ED with complaints of heavy menstrual bleeding abdominal pain. Patient states she has a history of menorrhagia however at the dysmenorrhea is newer onset for the past few months. She was seen by her outpatient provider and prescribed OCPs however patient states she has not been taking them. Patient states that her irregular periods started in 2016. She reports that her menses occur in an expected every month schedule, last 2 weeks. She states on the heaviest day of her cycle, she soaks through 6 supersized tampons and 3 pads per day. She states the abdominal pain she feels are like cramping sensations which she has not had since she was in her 25s. She denies any family history of female cancers, or any chronic medical illnesses that require medication treatment or follow-up with physicians. She states that she has been feeling a little bit lightheaded and dizzy when she stands up.     REVIEW OF SYSTEMS:   Constitutional: negative fever, negative chills, negative weight changes   HEENT: negative visual disturbances, negative headaches, negative dizziness, negative hearing loss  Breast: Negative breast abnormalities, negative breast lumps, negative nipple discharge  Respiratory: negative dyspnea, negative cough, negative SOB  Cardiovascular: negative chest pain,  negative palpitations, negative arrhythmia, negative syncope   Gastrointestinal: negative abdominal pain, negative RUQ pain, negative N/V, negative diarrhea, negative constipation, negative bowel changes, negative heartburn   Genitourinary: negative dysuria, negative hematuria, negative urinary incontinence, negative vaginal discharge, positive vaginal bleeding or spotting  Dermatological: negative rash, negative pruritis, negative mole or other skin changes  Hematologic: negative bruising  Immunologic/Lymphatic: negative recent illness, negative recent sick contact  Musculoskeletal: negative back pain, negative myalgias, negative arthralgias  Neurological:  negative dizziness, negative migraines, negative seizures, negative weakness  Behavior/Psych: negative depression, negative anxiety, negative SI, negative HI    _______________________________________________________________________    GYNECOLOGICAL HISTORY:  Age of Menarche: 11    Pap History: Last PAP was normal; 2022. OBSTETRIC HISTORY:   OB History    Para Term  AB Living   4 3 3 0 1 2   SAB IAB Ectopic Molar Multiple Live Births   1 0 0 0 0 2      # Outcome Date GA Lbr Rodger/2nd Weight Sex Delivery Anes PTL Lv   4 SAB 16 10w0d    OTHER   ND      Birth Comments: Cytotec    3 Term 09 39w3d  7 lb 8 oz (3.402 kg) F Vag-Spont EPI  REED      Name: Bishnu Nip   2 Term 08 40w0d  6 lb 15 oz (3.147 kg) F Vag-Spont EPI N REED      Birth Comments: second degree lac      Name: Farrukh Zieglern: 9  Apgar5: 9   1 Term               Obstetric Comments   Same FOB       PAST MEDICAL HISTORY:   has a past medical history of Class 3 severe obesity due to excess calories without serious comorbidity with body mass index (BMI) of 60.0 to 69.9 in adult [facility]), COVID-19 virus infection, Depression, Essential hypertension, and LASHAY (obstructive sleep apnea). PAST SURGICAL HISTORY:   has a past surgical history that includes [place] tooth extraction (Left, ). ALLERGIES:  Allergies as of 2023    (No Known Allergies)       MEDICATIONS:  No current facility-administered medications for this encounter.      Current Outpatient Medications   Medication Sig Dispense Refill    metroNIDAZOLE (FLAGYL) 500 MG tablet Take 1 tablet by mouth 2 times daily for 7 days 14 tablet 0    norethindrone (AYGESTIN) 5 MG tablet Take 1 tablet by mouth every 6 hours for 2 days, THEN 1 tablet every 8 (eight) hours for 2 days, THEN 1 tablet every 12 hours for 2 days, THEN 1 tablet daily for 7 days. 30 tablet 0    ferrous sulfate (IRON 325) 325 (65 Fe) MG tablet Take 1 tablet by mouth 2 times daily 180 tablet 3    ibuprofen (ADVIL;MOTRIN) 600 MG tablet Take 1 tablet by mouth every 6 hours as needed for Pain 40 tablet 1    acetaminophen (TYLENOL) 500 MG tablet Take 2 tablets by mouth every 6 hours as needed for Pain 20 tablet 0    ibuprofen (ADVIL;MOTRIN) 800 MG tablet Take 1 tablet by mouth every 8 hours as needed for Pain 20 tablet 0    albuterol sulfate HFA (VENTOLIN HFA) 108 (90 Base) MCG/ACT inhaler Inhale 2 puffs into the lungs every 4 hours as needed for Wheezing 18 g 0    lisinopril (PRINIVIL;ZESTRIL) 10 MG tablet Take 1 tablet by mouth daily 30 tablet 3    norethindrone-ethinyl estradiol-Fe (LO LOESTRIN FE) 1 MG-10 MCG / 10 MCG tablet Take 1 tablet by mouth daily 28 tablet 3       FAMILY HISTORY:  Family History of Breast, Ovarian, Colon or Uterine Cancer: No   family history includes Diabetes in her mother. SOCIAL HISTORY:   reports that she quit smoking about 16 months ago. Her smoking use included cigars. She started smoking about 20 years ago. She has never used smokeless tobacco. She reports current alcohol use. She reports that she does not currently use drugs after having used the following drugs: Marijuana [place]).   ________________________________________________________________________                                    Shira Sprout:  Vitals:    01/14/23 1228 01/14/23 1230 01/14/23 1436   BP: (!) 145/93  (!) 143/94   Pulse: (!) 101  78   Resp: 18  16   Temp: 97.8 °F (36.6 °C)  98.4 °F (36.9 °C)   TempSrc:   Oral   SpO2: 98%  100%   Weight:  (!) 454 lb (205.9 kg)                                                     INPUT/OUTPUT:  No intake/output data recorded. No intake/output data recorded. General appearance:  no apparent distress, alert, and cooperative  HEENT: head atraumatic, normocephalic, moist mucous membranes, trachea midline  Neurologic:  alert, oriented, normal speech, no focal findings or movement disorder noted  Lungs:  No increased work of breathing, good air exchange, clear to auscultation bilaterally, no crackles or wheezing  Heart:  regular rate and rhythm and no murmur    Abdomen:  soft, gravid, and non-tender  Extremities:  no calf tenderness, non edematous  Musculoskeletal: Gross strength equal and intact throughout, no gross abnormalities, range of motion normal in hips, knees, shoulders and spine  Psychiatric: Mood appropriate, normal affect   Rectal Exam: not indicated  Pelvic Exam:   Chaperone for Intimate Exam: Chaperone was present for entire exam, Chaperone Name: Dinora Tidwell RN  External genitalia: General appearance; normal, Hair distribution; normal, Lesions absent, blood noted on labia  Urinary system: urethral meatus normal  Vaginal: normal mucosa, moderate blood and clots in vaginal vault  Cervix: normal appearing cervix with slow oozing blood appreciated coming from os      LAB RESULTS:  Results for orders placed or performed during the hospital encounter of 01/14/23   Vaginitis DNA Probe    Specimen: Vaginal   Result Value Ref Range    Source . VAGINAL SWAB     Trichomonas Vaginalis DNA NEGATIVE NEGATIVE    Gardnerella Vaginalis, DNA Probe POSITIVE (A) NEGATIVE    Candida Species, DNA Probe NEGATIVE NEGATIVE   CBC with Auto Differential   Result Value Ref Range    WBC 10.2 3.5 - 11.3 k/uL    RBC 4.34 3.95 - 5.11 m/uL    Hemoglobin 10.8 (L) 11.9 - 15.1 g/dL    Hematocrit 35.4 (L) 36.3 - 47.1 %    MCV 81.6 (L) 82.6 - 102.9 fL    MCH 24.9 (L) 25.2 - 33.5 pg    MCHC 30.5 28.4 - 34.8 g/dL    RDW 17.5 (H) 11.8 - 14.4 %    Platelets 543 824 - 016 k/uL    MPV 10.8 8.1 - 13.5 fL    NRBC Automated 0.0 0.0 per 100 WBC    Seg Neutrophils 67 (H) 36 - 65 %    Lymphocytes 24 24 - 43 %    Monocytes 8 3 - 12 %    Eosinophils % 1 1 - 4 %    Basophils 0 0 - 2 %    Immature Granulocytes 0 0 %    Segs Absolute 6.77 1.50 - 8.10 k/uL    Absolute Lymph # 2.42 1.10 - 3.70 k/uL    Absolute Mono # 0.79 0.10 - 1.20 k/uL    Absolute Eos # 0.11 0.00 - 0.44 k/uL    Basophils Absolute 0.04 0.00 - 0.20 k/uL    Absolute Immature Granulocyte 0.03 0.00 - 0.30 k/uL    RBC Morphology ANISOCYTOSIS PRESENT    BMP   Result Value Ref Range    Glucose 105 (H) 70 - 99 mg/dL    BUN 7 6 - 20 mg/dL    Creatinine 0.72 0.50 - 0.90 mg/dL    Est, Glom Filt Rate >60 >60 mL/min/1.73m2    Calcium 8.4 (L) 8.6 - 10.4 mg/dL    Sodium 140 135 - 144 mmol/L    Potassium 4.1 3.7 - 5.3 mmol/L    Chloride 107 98 - 107 mmol/L    CO2 24 20 - 31 mmol/L    Anion Gap 9 9 - 17 mmol/L   Magnesium   Result Value Ref Range    Magnesium 1.9 1.6 - 2.6 mg/dL   HCG Qualitative, Serum   Result Value Ref Range    hCG Qual NEGATIVE NEGATIVE         DIAGNOSTICS:  TVUS 1/14/23:  Abnormal thickening of the endometrium with evidence of a 2.3 x 2.4 cm mass   in the endometrial cavity.            ASSESSMENT & PLAN:    Renetta Martinez is a 34 y.o. female [id number] who presents with heavy menstrual bleeding and cramping   - BP elevated, tachycardic, repeat vital reveled continued elevated BP (consistent with PMHx of chronic hypertension) with HR wnl  - Hgb 10.8  - Patient symptomatic endorsing light-headedness and dizziness  - LR bolus given and patient states that lightheadedness and dizziness has resolved  - TVUS ordered and indicated endometrial mass and discussed with patient  - Differential includes Fibroid vs polyp vs malignancy and likely to be etiology of AUB  - Aygestin taper to be started  - No contraindications to aygestin use per patient verbal report  - Clear counseling given on how to taper medication. Instructions written and patient endorses understanding  - PO iron given on d/c  - Patient counseled on importance of following up with outpatient OB provider for further evaluation and management, potential need for EMB/Hscope D&C, and discontinuation of aygestin. Patient endorses understanding.  - Patient to  meds at her outpatient pharmacy  - Patient stable for discharge home at this time      Patient Active Problem List    Diagnosis Date Noted    Snoring     Witnessed episode of apnea     Iron deficiency anemia due to chronic blood loss 08/23/2021    Sepsis with acute hypoxic respiratory failure without septic shock ([facility] [address] 08/23/2021    Vitamin D deficiency 08/23/2021    Pneumonia due to COVID-19 virus 08/22/2021    Chronic hypertension (no meds) 12/14/2018     Refer to IM      Abnormal uterine bleeding 12/07/2018    Marijuana abuse 12/07/2018    Secondary amenorrhea 06/07/2017    Depression 05/01/2017    Tobacco abuse 05/06/2016     Formatting of this note might be different from the original.  5/2016 Discussed cessation, risks of use on health, tobacco cessation aids   and tobacco cessation programs. Seasonal allergies 01/15/2015    Morbid obesity ([facility] [address] 09/04/2012    Contraceptive surveillance 12/11/2006    Dysplasia of cervix 09/06/2006     Formatting of this note might be different from the original.  8/2006 PAP with ASCUS +high risk  9/2006 colpo  12/06 pap BENITO I, LSIL, HPV  6/07 pap negative  9/07 pap negative  12/07 pap negative  5/2009 pap negative  4/2010 pap negative      Attention deficit hyperactivity disorder (ADHD) 10/04/2000     Formatting of this note might be different from the original.  Attention deficit disorder with hyperactivity         Plan discussed with Dr. Kin Lopez, who is agreeable.      Attending's Name: Dr. Joy Drummond MD  Ob/Gyn Resident   [address]  1/14/2023, 4:38 PM

## 2023-01-14 NOTE — DISCHARGE INSTRUCTIONS
Call today or tomorrow to follow up with SHEILA Hook CNP  or your OB / GYN in 5-7 days. OB/GYN did place you on an Aygestin taper. Please take this as prescribed. You were found to have bacterial vaginosis and prescription for Flagyl was sent to your pharmacy. Transvaginal ultrasound likely demonstrates uterine fibroid. It is important that you follow-up with OB/GYN regarding this. Use ibuprofen or Tylenol (unless prescribed medications that have Tylenol in it) for pain. You can take over the counter Ibuprofen (advil) tablets (4 tablets every 8 hours or 3 tablets every 6 hours or 2 tablets every 4 hours)    Return to the Emergency Department for worsening of vaginal bleeding, using more than 4 pads per hour, feeling of weakness, dizzy, nausea / vomiting, any other care or concern.

## 2023-01-14 NOTE — ED PROVIDER NOTES
[facility]  ED  Emergency Department Encounter  Emergency Medicine Resident     Pt Name: Kristi Chris  MRN: [id number]  [place] 1990  Date of evaluation: 1/14/23  PCP:  [facility] [place] [facility]       Chief Complaint   Patient presents with    Vaginal Bleeding       HISTORY OFPRESENT ILLNESS  (Location/Symptom, Timing/Onset, Context/Setting, Quality, Duration, Modifying Factors,Severity.)      Kristi Chris is a 34 y. o.yo female who presents with complaints of abnormal uterine bleeding. Patient states that she started her menstrual cycle roughly 3 days ago and this morning had heavy vaginal bleeding. Patient states she is going through 1 super tampon an hour. Patient states that she does have a history of abnormal bleeding but it is never been this bad before. Patient reports lower abdominal cramps, denies any sharp abdominal pain. Patient denies any concerns for pregnancy states that she is not sexually active. Patient states that she is feeling mildly dizzy upon standing. She denies any history of bleeding disorders, denies any clotting disorders. Patient denies any urinary symptoms or vaginal discharge aside from the bleeding. She has a [address] / SURGICAL / SOCIAL / FAMILY HISTORY      has a past medical history of Class 3 severe obesity due to excess calories without serious comorbidity with body mass index (BMI) of 60.0 to 69.9 in [facility]), COVID-19 virus infection, Depression, Essential hypertension, and LASHAY (obstructive sleep apnea).     has a past surgical history that includes [place] tooth extraction (Left, 2014).      Social History     Socioeconomic History    Marital status: Single     Spouse name: Not on file    Number of children: Not on file    Years of education: Not on file    Highest education level: Not on file   Occupational History    Not on file   Tobacco Use    Smoking status: Former     Types: Cigars     Start date:      Quit date: 2021     Years since quittin.3    Smokeless tobacco: Never    Tobacco comments:     1-2 black and mild   Vaping Use    Vaping Use: Never used   Substance and Sexual Activity    Alcohol use: Yes     Comment: Occasionally     Drug use: Not Currently     Types: Marijuana Shellye Burkitt)    Sexual activity: Yes     Partners: Male     Birth control/protection: I.U.D. Other Topics Concern    Not on file   Social History Narrative    Not on file     Social Determinants of Health     Financial Resource Strain: Not on file   Food Insecurity: Not on file   Transportation Needs: Not on file   Physical Activity: Not on file   Stress: Not on file   Social Connections: Not on file   Intimate Partner Violence: Not on file   Housing Stability: Not on file       Family History   Problem Relation Age of Onset    Diabetes Mother         Allergies:  Patient has no known allergies. Home Medications:  Prior to Admission medications    Medication Sig Start Date End Date Taking?  Authorizing Provider   metroNIDAZOLE (FLAGYL) 500 MG tablet Take 1 tablet by mouth 2 times daily for 7 days 23 Yes Mariola Benavides DO   acetaminophen (TYLENOL) 500 MG tablet Take 2 tablets by mouth every 6 hours as needed for Pain 22   Alaina Zavaleta MD   ibuprofen (ADVIL;MOTRIN) 800 MG tablet Take 1 tablet by mouth every 8 hours as needed for Pain 22   Alaina Zavaleta MD   albuterol sulfate HFA (VENTOLIN HFA) 108 (90 Base) MCG/ACT inhaler Inhale 2 puffs into the lungs every 4 hours as needed for Wheezing 22   Mali Schmitt MD   lisinopril (PRINIVIL;ZESTRIL) 10 MG tablet Take 1 tablet by mouth daily 21   Lucina Zuluaga MD   norethindrone-ethinyl estradiol-Fe (LO LOESTRIN FE) 1 MG-10 MCG / 10 MCG tablet Take 1 tablet by mouth daily 21   Roxane Valdes DO       REVIEW OFSYSTEMS    (2-9 systems for level 4, 10 or more for level 5)      Review of Systems   Constitutional:  Negative for chills, diaphoresis, fatigue and fever. HENT:  Negative for congestion, sinus pressure, sinus pain, sore throat and trouble swallowing. Eyes:  Negative for photophobia, redness and visual disturbance. Respiratory:  Negative for cough, chest tightness and shortness of breath. Cardiovascular:  Negative for chest pain, palpitations and leg swelling. Gastrointestinal:  Negative for abdominal pain, constipation, diarrhea, nausea and vomiting. Genitourinary:  Positive for menstrual problem and vaginal bleeding. Negative for difficulty urinating, dysuria, flank pain, genital sores, urgency, vaginal discharge and vaginal pain. Musculoskeletal:  Negative for back pain, neck pain and neck stiffness. Skin:  Negative for color change, pallor and rash. Neurological:  Negative for dizziness, tremors, speech difficulty, weakness, light-headedness and headaches. PHYSICAL EXAM   (up to 7 for level 4, 8 or more forlevel 5)      INITIAL VITALS:   ED Triage Vitals   BP Temp Temp src Pulse Resp SpO2 Height Weight   145/93 97.8 oral 101 18 98% -- --       Physical Exam  Exam conducted with a chaperone present. Constitutional:       General: She is not in acute distress. Appearance: She is obese. HENT:      Head: Normocephalic and atraumatic. Right Ear: External ear normal.      Left Ear: External ear normal.      Nose: Nose normal. No rhinorrhea. Eyes:      Extraocular Movements: Extraocular movements intact. Pupils: Pupils are equal, round, and reactive to light. Cardiovascular:      Rate and Rhythm: Normal rate. Pulses: Normal pulses. Pulmonary:      Effort: Pulmonary effort is normal.      Breath sounds: Normal breath sounds. Abdominal:      Palpations: Abdomen is soft. Tenderness: There is no abdominal tenderness. There is no guarding. Genitourinary:     Pubic Area: No rash. Vagina: Bleeding present. Comments: Technically difficult exam secondary to body habitus. Moderate amount of blood noted in the vaginal vault, no obvious lesions or abrasions. Musculoskeletal:         General: No swelling. Normal range of motion. Cervical back: Normal range of motion and neck supple. Skin:     General: Skin is warm and dry. Neurological:      General: No focal deficit present. Mental Status: She is alert and oriented to person, place, and time. DIFFERENTIAL  DIAGNOSIS     PLAN (LABS / IMAGING / EKG):  Orders Placed This Encounter   Procedures    C.trachomatis N.gonorrhoeae DNA    Vaginitis DNA Probe    US NON OB TRANSVAGINAL    CBC with Auto Differential    BMP    Magnesium    HCG Qualitative, Serum    Urinalysis with Reflex to Culture    Inpatient consult to Obstetrics / Gynecology       MEDICATIONS ORDERED:  Orders Placed This Encounter   Medications    ibuprofen (ADVIL;MOTRIN) tablet 800 mg    0.9 % sodium chloride bolus    norethindrone (AYGESTIN) tablet 5 mg    metroNIDAZOLE (FLAGYL) 500 MG tablet     Sig: Take 1 tablet by mouth 2 times daily for 7 days     Dispense:  14 tablet     Refill:  0       DDX: Abnormal uterine bleeding, irregular menses, vaginitis, miscarriage, pregnancy    Initial MDM/Plan: 34 y.o. female who presents with concerns for heavy vaginal bleeding. Patient has history of abnormal uterine bleeding. Was evaluated by her provider in November and work-up was ordered for heavy menstrual cycles as well as irregular menses, patient never was able to get this completed. Patient reports menses started 3 days ago and has become heavy this morning. She is complaining of some dizziness mild tachycardia noted on exam.  She denies any bleeding disorders that she is aware of.   We will plan for lab work, pregnancy testing and pelvic exam.    DIAGNOSTIC RESULTS / [facility] Walker COURSE / MDM     LABS:  Labs Reviewed   VAGINITIS DNA PROBE - Abnormal; Notable for the following components:       Result Value    Gardnerella Vaginalis, DNA Probe POSITIVE (*)     All other components within normal limits   CBC WITH AUTO DIFFERENTIAL - Abnormal; Notable for the following components:    Hemoglobin 10.8 (*)     Hematocrit 35.4 (*)     MCV 81.6 (*)     MCH 24.9 (*)     RDW 17.5 (*)     Seg Neutrophils 67 (*)     All other components within normal limits   BASIC METABOLIC PANEL - Abnormal; Notable for the following components:    Glucose 105 (*)     Calcium 8.4 (*)     All other components within normal limits   C.TRACHOMATIS N.GONORRHOEAE DNA   MAGNESIUM   HCG, SERUM, QUALITATIVE   URINALYSIS WITH REFLEX TO CULTURE         RADIOLOGY:  US NON OB TRANSVAGINAL    Result Date: 1/14/2023  EXAMINATION: PELVIC ULTRASOUND 1/14/2023 TECHNIQUE: Transvaginal pelvic ultrasound was performed. COMPARISON: None HISTORY: ORDERING SYSTEM PROVIDED HISTORY: AUB TECHNOLOGIST PROVIDED HISTORY: AUB FINDINGS: Uterus: The uterus is normal in shape and echogenicity with normal uterine length of about 12.2 cm. Endometrium: The endometrial stripe is  measuring 34 mm thick with evidence of a endometrial mass measuring 2.3 x 2.4 cm. . Ovaries:  Both ovaries are not visualized . No free fluid is noted. Nabothian cysts are seen     Abnormal thickening of the endometrium with evidence of a 2.3 x 2.4 cm mass in the endometrial cavity. RECOMMENDATION: Gyn consultation and consider follow-up MRI if indicated        EKG      All EKG's are interpreted by the Emergency Department Physicianwho either signs or Co-signs this chart in the absence of a cardiologist.    EMERGENCY DEPARTMENT COURSE:  ED Course as of 01/14/23 1631   Sat Jan 14, 2023   1237 Pt seen and evaluated  [CD]   1258 Pelvic exam performed with nurse chaperone, moderate amount of blood noted in vaginal vault  [CD]   1301 Hemoglobin Quant(!): 10.8 [CD]   1314 hCG Qual: NEGATIVE [CD]   1314 BMP wnl [CD]   1335 Given patient's report of 1 super tampon per hour and hemoglobin of 10.8 will discuss case with OB/GYN. [CD]   7331 Spoke with OB/GYN. They state that they will come down and evaluate patient. [CD]   1355 Gardnerella Vaginalis, DNA Probe(!): POSITIVE [CD]   1403 OB at bedside [CD]   1411 Ob requesting 1L of fluid bolus to help with pts symptoms. [CD]   8932 OB planning for Aygestin taper and TVUS  [CD]   1603 IMPRESSION:  Abnormal thickening of the endometrium with evidence of a 2.3 x 2.4 cm mass  in the endometrial cavity. [CD]   46 Page sent to [facility] resident regarding the TVUS results  [CD]   1624 Heart Rate: 78  Hr improved  [CD]   1606 OB at bedside. They believe that this is likely a fibroid. They will send an Aygestin taper for patient. I will send Flagyl and recommend follow-up with OB/GYN. [CD]      ED Course User Index  [CD] Ariana Romero DO          PROCEDURES:  None    CONSULTS:  IP CONSULT TO OB GYN    CRITICAL CARE:  35 minutes    FINAL IMPRESSION      1. Dysfunctional uterine bleeding    2.  Bacterial vaginosis          DISPOSITION / PLAN     DISPOSITION Decision To Discharge 01/14/2023 04:28:30 PM      PATIENT REFERRED TO:  SHEILA Martinez - CNP  [facility] [address]  [phone]    Call in 2 days  For ER follow-up    [facility] [address]  [address] [place] [address]  [phone]  Schedule an appointment as soon as possible for a visit in 1 week  For ER follow-up    [facility]  [address]  Go to   If symptoms worsen    DISCHARGE MEDICATIONS:  New Prescriptions    METRONIDAZOLE (FLAGYL) 500 MG TABLET    Take 1 tablet by mouth 2 times daily for 7 days       Ariana Romero DO  Emergency Medicine Resident    (Please note that portions of this note were completed with a voice recognition program.Efforts were made to edit the dictations but occasionally words are mis-transcribed.)        Ariana Romero DO  Resident  01/14/23 9716

## 2023-01-14 NOTE — Clinical Note
Parker Andrew was seen and treated in our emergency department on 1/14/2023. She may return to work on 01/16/2023. If you have any questions or concerns, please don't hesitate to call.       Camacho Hinojosa, DO

## 2023-01-27 ENCOUNTER — TELEPHONE (OUTPATIENT)
Dept: INTERNAL MEDICINE CLINIC | Age: 33
End: 2023-01-27

## 2023-01-27 ENCOUNTER — TELEMEDICINE (OUTPATIENT)
Dept: OBGYN CLINIC | Age: 33
End: 2023-01-27
Payer: MEDICARE

## 2023-01-27 DIAGNOSIS — N94.89 ENDOMETRIAL MASS: ICD-10-CM

## 2023-01-27 DIAGNOSIS — N92.1 MENORRHAGIA WITH IRREGULAR CYCLE: Primary | ICD-10-CM

## 2023-01-27 DIAGNOSIS — N87.9 DYSPLASIA OF CERVIX: ICD-10-CM

## 2023-01-27 DIAGNOSIS — N93.9 ABNORMAL UTERINE BLEEDING: ICD-10-CM

## 2023-01-27 DIAGNOSIS — Z72.0 TOBACCO ABUSE: ICD-10-CM

## 2023-01-27 DIAGNOSIS — I10 ESSENTIAL HYPERTENSION: ICD-10-CM

## 2023-01-27 DIAGNOSIS — N85.2 ENLARGED UTERUS: ICD-10-CM

## 2023-01-27 DIAGNOSIS — R93.89 ENDOMETRIAL THICKENING ON ULTRASOUND: ICD-10-CM

## 2023-01-27 DIAGNOSIS — D50.0 IRON DEFICIENCY ANEMIA DUE TO CHRONIC BLOOD LOSS: ICD-10-CM

## 2023-01-27 DIAGNOSIS — G47.33 OSA (OBSTRUCTIVE SLEEP APNEA): ICD-10-CM

## 2023-01-27 PROCEDURE — 99214 OFFICE O/P EST MOD 30 MIN: CPT | Performed by: OBSTETRICS & GYNECOLOGY

## 2023-01-27 PROCEDURE — G8427 DOCREV CUR MEDS BY ELIG CLIN: HCPCS | Performed by: OBSTETRICS & GYNECOLOGY

## 2023-01-27 NOTE — PROGRESS NOTES
Nitesh Sweet  2023    Nitesh Sweet (:  1990) has requested an audio/video evaluation for the following concern(s):    1. Menorrhagia with irregular cycle    2. Abnormal uterine bleeding    3. Tobacco abuse    4. BMI 70 and over, adult ([address]    5. LASHAY (obstructive sleep apnea)    6. Essential hypertension    7. Iron deficiency anemia due to chronic blood loss    8. Endometrial thickening on ultrasound    9. Endometrial mass    10. Enlarged uterus    11. Dysplasia of cervix          TELEHEALTH EVALUATION -- Audio/Visual (During [place] [address] public health emergency)      Nitesh Sweet is a 34 y.o. female [id number]      She was here to follow-up regarding her labs and diagnostics ordered at her last visit for the diagnosis of:    ICD-10-CM    1. Menorrhagia with irregular cycle  N92.1 CBC with Auto Differential     TSH with Reflex     HCG, Quantitative, Pregnancy     Protime-INR     APTT     Hemoglobin A1C      2. Abnormal uterine bleeding  N93.9       3. Tobacco abuse  Z72.0       4. BMI 70 and over, adult ([address]  Z68.45 Hemoglobin A1C     Comprehensive Metabolic Panel      5. LASHAY (obstructive sleep apnea)  G47.33       6. Essential hypertension  I10       7. Iron deficiency anemia due to chronic blood loss  D50.0 CBC with Auto Differential      8. Endometrial thickening on ultrasound  R93.89       9. Endometrial mass  N94.89       10. Enlarged uterus  N85.2       11. Dysplasia of cervix  N87.9           Her bowels are regular and she is voiding without difficulty. The pt was seen in the ED 2023 with mild symptomatic anemia. She was given IV Bolus and started on a taper dose of Agestyn. Today menses has stopped and pt started FESO4 as prescribed. The pt had imaging with ? Mass effect in endometrium either polyp/fibroid. Other etiologies possible. Pt wishes future fertility. I reviewed surgical resection of mass and RB and procedure with possible complications.  I discussed all possible etiologies. The pt will need Sx Clearance for (BMI, LASHAY, and HTN). Today NO SOB, CP, CHARLINE,or dizziness. Bleeding has stopped. Past Medical History:   Diagnosis Date    Class 3 severe obesity due to excess calories without serious comorbidity with body mass index (BMI) of 60.0 to 69.9 in adult [facility])     COVID-19 virus infection     Depression 2017    Essential hypertension     LASHAY (obstructive sleep apnea)          Past Surgical History:   Procedure Laterality Date    WISDOM TOOTH EXTRACTION Left          Family History   Problem Relation Age of Onset    Diabetes Mother          Social History     Tobacco Use    Smoking status: Former     Types: Cigars     Start date:      Quit date: 2021     Years since quittin.4    Smokeless tobacco: Never    Tobacco comments:     1-2 black and mild   Vaping Use    Vaping Use: Never used   Substance Use Topics    Alcohol use: Yes     Comment: Occasionally     Drug use: Not Currently     Types: Marijuana [facility] [place])         MEDICATIONS:  Current Outpatient Medications   Medication Sig Dispense Refill    norethindrone (AYGESTIN) 5 MG tablet Take 1 tablet by mouth every 6 hours for 2 days, THEN 1 tablet every 8 (eight) hours for 2 days, THEN 1 tablet every 12 hours for 2 days, THEN 1 tablet daily for 7 days. 30 tablet 0     No current facility-administered medications for this visit. ALLERGIES:  Allergies as of 2023    (No Known Allergies)         not currently breastfeeding. PE is limited due to the virtual visit    Abdomen: Soft non-tender; good bowel sounds. No guarding, rebound or rigidity. No CVA tenderness bilaterally reported when questioned.  (Viewed Virtually)    Extremities: No calf tenderness, DTR 2/4, and No edema bilaterally as inspected by video and palpation by the patient (Viewed Virtually)    Pelvic: (Virtual Visit-Not Completed)    Diagnostics:  US NON OB TRANSVAGINAL    Result Date: 2023  EXAMINATION: PELVIC ULTRASOUND 1/14/2023 TECHNIQUE: Transvaginal pelvic ultrasound was performed. COMPARISON: None HISTORY: ORDERING SYSTEM PROVIDED HISTORY: AUB TECHNOLOGIST PROVIDED HISTORY: AUB     FINDINGS:   Uterus: The uterus is normal in shape and echogenicity with normal uterine length of about 12.2 cm. Endometrium: The endometrial stripe is  measuring 34 mm thick with evidence of a endometrial mass measuring 2.3 x 2.4 cm. .   Ovaries:  Both ovaries are not visualized . No free fluid is noted. Nabothian cysts are seen     Abnormal thickening of the endometrium with evidence of a 2.3 x 2.4 cm mass in the endometrial cavity. RECOMMENDATION: Gyn consultation and consider follow-up MRI if indicated         Lab Results:  Results for orders placed or performed during the hospital encounter of 01/14/23   C.trachomatis N.gonorrhoeae DNA    Specimen: Cervix   Result Value Ref Range    Specimen Description . CERVIX     C. trachomatis DNA NEGATIVE NEGATIVE    N. gonorrhoeae DNA NEGATIVE NEGATIVE   Vaginitis DNA Probe    Specimen: Vaginal   Result Value Ref Range    Source . VAGINAL SWAB     Trichomonas Vaginalis DNA NEGATIVE NEGATIVE    Gardnerella Vaginalis, DNA Probe POSITIVE (A) NEGATIVE    Candida Species, DNA Probe NEGATIVE NEGATIVE   CBC with Auto Differential   Result Value Ref Range    WBC 10.2 3.5 - 11.3 k/uL    RBC 4.34 3.95 - 5.11 m/uL    Hemoglobin 10.8 (L) 11.9 - 15.1 g/dL    Hematocrit 35.4 (L) 36.3 - 47.1 %    MCV 81.6 (L) 82.6 - 102.9 fL    MCH 24.9 (L) 25.2 - 33.5 pg    MCHC 30.5 28.4 - 34.8 g/dL    RDW 17.5 (H) 11.8 - 14.4 %    Platelets 383 635 - 422 k/uL    MPV 10.8 8.1 - 13.5 fL    NRBC Automated 0.0 0.0 per 100 WBC    Seg Neutrophils 67 (H) 36 - 65 %    Lymphocytes 24 24 - 43 %    Monocytes 8 3 - 12 %    Eosinophils % 1 1 - 4 %    Basophils 0 0 - 2 %    Immature Granulocytes 0 0 %    Segs Absolute 6.77 1.50 - 8.10 k/uL    Absolute Lymph # 2.42 1.10 - 3.70 k/uL    Absolute Mono # 0.79 0.10 - 1.20 k/uL    Absolute Eos # 0.11 0.00 - 0.44 k/uL    Basophils Absolute 0.04 0.00 - 0.20 k/uL    Absolute Immature Granulocyte 0.03 0.00 - 0.30 k/uL    RBC Morphology ANISOCYTOSIS PRESENT    BMP   Result Value Ref Range    Glucose 105 (H) 70 - 99 mg/dL    BUN 7 6 - 20 mg/dL    Creatinine 0.72 0.50 - 0.90 mg/dL    Est, Glom Filt Rate >60 >60 mL/min/1.73m2    Calcium 8.4 (L) 8.6 - 10.4 mg/dL    Sodium 140 135 - 144 mmol/L    Potassium 4.1 3.7 - 5.3 mmol/L    Chloride 107 98 - 107 mmol/L    CO2 24 20 - 31 mmol/L    Anion Gap 9 9 - 17 mmol/L   Magnesium   Result Value Ref Range    Magnesium 1.9 1.6 - 2.6 mg/dL   HCG Qualitative, Serum   Result Value Ref Range    hCG Qual NEGATIVE NEGATIVE   BV treated per pt  Anemia treated peer pt        GYN Cytology  Order: [id number]  Status: Final result    Visible to patient: Yes (seen)    Next appt: None    1 Result Note    1  Topic  Component 11/7/22 0821    Cytology Report INTERPRETATION     Cervical material, (ThinPrep vial, Imaging-assisted review):   Specimen Adequacy:        Satisfactory for evaluation.        - Endocervical/transformation zone component present. Descriptive Diagnosis:        Negative for intraepithelial lesion or malignancy. Cytotechnologist:   Ki RUSH(ASCP)   **Electronically Signed Out**   ey/11/17/2022         Procedure/Addendum   HPV Procedure Report        Date Ordered:     11/8/2022     Status: Signed Out        Date Complete:     11/9/2022     By: System Interface        Date Reported:     11/9/2022              Sample:  HPV Type 16      Result:   Not Detected      Ref Range:   (Not Detected)   Sample:  HPV Type 18      Result:   Not Detected      Ref Range: (Not   Detected)   Sample:   Other High Risk HPV      Result:   Not Detected      Ref   Range: (Not Detected)   Sample:  HPV Interp      Result:         Ref Range: (Not Detected)   This test amplifies and detects DNA of 14 high-risk HPV types   associated with cervical cancer and its precursor lesions    (HPV types 16,18, 31, 33, 35, 39, 45, 51, 52, 56, 58, 59, 66, and   68). Sensitivity may be affected by specimen collection methods, stage of   infection, and the presence of interfering    substances. Results should be interpreted in conjunction with other   available laboratory and clinical data. A negative    high-risk HPV result does not exclude the possibility of future   cytologic HSIL or underlying CIN2-3 or cancer. This test is intended for medical purposes only and is not valid for   the evaluation of suspected sexual abuse or for    other forensic purposes. Assessment:   Diagnosis Orders   1. Menorrhagia with irregular cycle  CBC with Auto Differential    TSH with Reflex    HCG, Quantitative, Pregnancy    Protime-INR    APTT    Hemoglobin A1C      2. Abnormal uterine bleeding        3. Tobacco abuse        4. BMI 70 and over, adult ([address]  Hemoglobin A1C    Comprehensive Metabolic Panel      5. LASHAY (obstructive sleep apnea)        6. Essential hypertension        7. Iron deficiency anemia due to chronic blood loss  CBC with Auto Differential      8. Endometrial thickening on ultrasound        9. Endometrial mass        10. Enlarged uterus        11. Dysplasia of cervix          Chief Complaint   Patient presents with    Follow-up         Patient Active Problem List    Diagnosis Date Noted    LASHAY (obstructive sleep apnea) 01/27/2023     Priority: High    Essential hypertension 01/27/2023     Priority: High    Menorrhagia with irregular cycle 01/27/2023     Priority: High    Enlarged uterus 01/27/2023     Priority: High    Endometrial mass 01/27/2023     Priority: High     US NON OB TRANSVAGINAL    Result Date: 1/14/2023  EXAMINATION: PELVIC ULTRASOUND 1/14/2023 TECHNIQUE: Transvaginal pelvic ultrasound was performed. COMPARISON: None HISTORY: ORDERING SYSTEM PROVIDED HISTORY: AUB TECHNOLOGIST PROVIDED HISTORY: AUB     FINDINGS:   Uterus:  The uterus is normal in shape and echogenicity with normal uterine length of about 12.2 cm. Endometrium: The endometrial stripe is  measuring 34 mm thick with evidence of a endometrial mass measuring 2.3 x 2.4 cm. .   Ovaries:  Both ovaries are not visualized . No free fluid is noted. Nabothian cysts are seen     Abnormal thickening of the endometrium with evidence of a 2.3 x 2.4 cm mass in the endometrial cavity. RECOMMENDATION: Gyn consultation and consider follow-up MRI if indicated       Endometrial thickening on ultrasound 01/27/2023     Priority: High    BMI 70 and over, adult ([facility] 01/27/2023     Priority: High    Iron deficiency anemia due to chronic blood loss 08/23/2021     Priority: High    Chronic hypertension (no meds) 12/14/2018     Priority: High     Refer to IM      Abnormal uterine bleeding 12/07/2018     Priority: Medium    Snoring     Witnessed episode of apnea     Sepsis with acute hypoxic respiratory failure without septic shock ([facility] [address] 08/23/2021    Vitamin D deficiency 08/23/2021    Pneumonia due to COVID-19 virus 08/22/2021    Marijuana abuse 12/07/2018    Secondary amenorrhea 06/07/2017    Depression 05/01/2017    Tobacco abuse 05/06/2016     Formatting of this note might be different from the original.  5/2016 Discussed cessation, risks of use on health, tobacco cessation aids   and tobacco cessation programs.       Seasonal allergies 01/15/2015    Morbid obesity ([facility] 09/04/2012    Contraceptive surveillance 12/11/2006    Dysplasia of cervix 09/06/2006     Formatting of this note might be different from the original.  8/2006 PAP with ASCUS +high risk  9/2006 colpo  12/06 pap BENITO I, LSIL, HPV  6/07 pap negative  9/07 pap negative  12/07 pap negative  5/2009 pap negative  4/2010 pap negative  11/7/2022 PAP WNL   Neg HPV      Attention deficit hyperactivity disorder (ADHD) 10/04/2000     Formatting of this note might be different from the original.  Attention deficit disorder with hyperactivity         PLAN:  Return in about 1 week (around 2/3/2023) for  Dr Cristian Leone, Follow-Up On Current Problem. Agestyn taper from ED 1/2023 continues  FESO4-Continues  FU in office complete labs as ordered with repeat CBC in 1 week. Plan: D&C Hysteroscopy and Myosure Polypectomy or Myomectomy  Surgical clearance required (LASHAY, HTN, BMI)-  Anesthesia consult for BMI before OR as well    FU Office visit will change taper to Megace 20 mg tid SE and RB reviewed. Any further bleeding or lightheadedness, SOB or CP to ED or 911 reviewed  Return to the office in 1 weeks. Barrier recommendations and STD counseling was completed  Counseled on preventative health maintenance follow-up. Orders Placed This Encounter   Procedures    CBC with Auto Differential     Standing Status:   Future     Standing Expiration Date:   1/27/2024    TSH with Reflex     Standing Status:   Future     Standing Expiration Date:   1/27/2024    HCG, Quantitative, Pregnancy     Standing Status:   Future     Standing Expiration Date:   1/27/2024    Protime-INR     Standing Status:   Future     Standing Expiration Date:   1/27/2024     Order Specific Question:   Daily Coumadin Dose? Answer:   N    APTT     Standing Status:   Future     Standing Expiration Date:   1/27/2024     Order Specific Question:   Daily Heparin Dose? Answer:   N    Hemoglobin A1C     Standing Status:   Future     Standing Expiration Date:   2/27/2023         Standing Status:   Future     Standing Expiration Date:   1/27/2024    Comprehensive Metabolic Panel     Standing Status:   Future     Standing Expiration Date:   1/27/2024           Aquiles Francisco is a 34 y.o. female female was evaluated by a Virtual Visit (video visit) encounter to address concerns as mentioned above. A caregiver was present when appropriate.  Due to this being a TeleHealth encounter (During [facility]- public health emergency), evaluation of the following organ systems was limited: Vitals/Constitutional/EENT/Resp/CV/GI//MS/Neuro/Skin/Heme-Lymph-Imm. Pursuant to the emergency declaration under the [facility] [address] and the [employer] Act, this Virtual Visit was conducted with patient's (and/or legal guardian's) consent, to reduce the patient's risk of exposure to COVID-19 and provide necessary medical care. The patient (and/or legal guardian) has also been advised to contact this office for worsening conditions or problems, and seek emergency medical treatment and/or call 911 if deemed necessary. Services were provided through a video synchronous discussion virtually to substitute for in-person clinic visit. Patient and provider were located at their individual homes. Electronically signed by Humaira Hernandez DO on 1/27/23 at 8:58 AM EST     An electronic signature was used to authenticate this note. The patient, Krystal Mills is a 34 y.o. female, was seen with a total time spent of 20 minutes for the visit on this date of service by the E/M provider. The time component had both face to face and non face to face time spent in determining the total time component. Counseling and education regarding her diagnosis listed below and her options regarding those diagnoses were also included in determining her time component. Diagnosis Orders   1. Menorrhagia with irregular cycle  CBC with Auto Differential    TSH with Reflex    HCG, Quantitative, Pregnancy    Protime-INR    APTT    Hemoglobin A1C      2. Abnormal uterine bleeding        3. Tobacco abuse        4. BMI 70 and over, adult ([facility] [address] [facility]  Hemoglobin A1C    Comprehensive Metabolic Panel      5. LASHAY (obstructive sleep apnea)        6. Essential hypertension        7. Iron deficiency anemia due to chronic blood loss  CBC with Auto Differential      8. Endometrial thickening on ultrasound        9.  Endometrial mass        10. Enlarged uterus        11. Dysplasia of cervix             The patient had her preventative health maintenance recommendations and follow-up reviewed with her at the completion of her visit.

## 2023-01-27 NOTE — TELEPHONE ENCOUNTER
Medical surgical clearance request received 01/27/23    Surgeon: Dr. Galindo    Procedure: D&C Hysteroscopy and myosure polypectomy or mymectomy    Date of Procedure: not yet scheduled    Last appt: 12/28/2021    Next appt: Visit date not found    PATs received:  No

## 2023-01-31 RX ORDER — MEGESTROL ACETATE 20 MG/1
20 TABLET ORAL 3 TIMES DAILY
Qty: 90 TABLET | Refills: 3 | Status: SHIPPED | OUTPATIENT
Start: 2023-01-31 | End: 2023-03-02

## 2023-01-31 NOTE — TELEPHONE ENCOUNTER
Patient was given Agestyn in ER for abn,bleeding then  had virtual with Dr Robert Burkett. Per Dr Robert Burkett today send in Megace 20mg once patient is cleared for Surgery she will make appt. in office.

## 2023-02-02 ENCOUNTER — TELEPHONE (OUTPATIENT)
Dept: INTERNAL MEDICINE CLINIC | Age: 33
End: 2023-02-02

## 2023-02-02 NOTE — TELEPHONE ENCOUNTER
----- Message from Sid Schmitz sent at 2/2/2023 10:23 AM EST -----  Subject: Appointment Request    Reason for Call: Established Patient Appointment needed: Routine Pre-Op    QUESTIONS    Reason for appointment request? No appointments available during search     Additional Information for Provider? patient needs to have a pre op for   surgery please call to set up with her.  ---------------------------------------------------------------------------  --------------  [email or web address]  [phone]; OK to leave message on voicemail  ---------------------------------------------------------------------------  --------------  SCRIPT ANSWERS  COVID Screen: Tracey Loaiza

## 2023-02-07 ENCOUNTER — OFFICE VISIT (OUTPATIENT)
Dept: INTERNAL MEDICINE CLINIC | Age: 33
End: 2023-02-07
Payer: MEDICAID

## 2023-02-07 VITALS — OXYGEN SATURATION: 96 % | HEART RATE: 84 BPM | DIASTOLIC BLOOD PRESSURE: 86 MMHG | SYSTOLIC BLOOD PRESSURE: 136 MMHG

## 2023-02-07 DIAGNOSIS — E66.01 MORBID OBESITY (HCC): ICD-10-CM

## 2023-02-07 DIAGNOSIS — I10 ESSENTIAL HYPERTENSION: Primary | ICD-10-CM

## 2023-02-07 DIAGNOSIS — R60.0 LOCALIZED EDEMA: ICD-10-CM

## 2023-02-07 PROCEDURE — G8417 CALC BMI ABV UP PARAM F/U: HCPCS | Performed by: INTERNAL MEDICINE

## 2023-02-07 PROCEDURE — 3079F DIAST BP 80-89 MM HG: CPT | Performed by: INTERNAL MEDICINE

## 2023-02-07 PROCEDURE — 99213 OFFICE O/P EST LOW 20 MIN: CPT | Performed by: INTERNAL MEDICINE

## 2023-02-07 PROCEDURE — G8484 FLU IMMUNIZE NO ADMIN: HCPCS | Performed by: INTERNAL MEDICINE

## 2023-02-07 PROCEDURE — 1036F TOBACCO NON-USER: CPT | Performed by: INTERNAL MEDICINE

## 2023-02-07 PROCEDURE — 3075F SYST BP GE 130 - 139MM HG: CPT | Performed by: INTERNAL MEDICINE

## 2023-02-07 PROCEDURE — G8427 DOCREV CUR MEDS BY ELIG CLIN: HCPCS | Performed by: INTERNAL MEDICINE

## 2023-02-07 RX ORDER — FUROSEMIDE 40 MG/1
40 TABLET ORAL DAILY PRN
Qty: 20 TABLET | Refills: 0 | Status: SHIPPED | OUTPATIENT
Start: 2023-02-07

## 2023-02-07 ASSESSMENT — PATIENT HEALTH QUESTIONNAIRE - PHQ9
1. LITTLE INTEREST OR PLEASURE IN DOING THINGS: 0
SUM OF ALL RESPONSES TO PHQ QUESTIONS 1-9: 1
3. TROUBLE FALLING OR STAYING ASLEEP: 0
SUM OF ALL RESPONSES TO PHQ QUESTIONS 1-9: 1
9. THOUGHTS THAT YOU WOULD BE BETTER OFF DEAD, OR OF HURTING YOURSELF: 0
SUM OF ALL RESPONSES TO PHQ9 QUESTIONS 1 & 2: 0
2. FEELING DOWN, DEPRESSED OR HOPELESS: 0
4. FEELING TIRED OR HAVING LITTLE ENERGY: 0
10. IF YOU CHECKED OFF ANY PROBLEMS, HOW DIFFICULT HAVE THESE PROBLEMS MADE IT FOR YOU TO DO YOUR WORK, TAKE CARE OF THINGS AT HOME, OR GET ALONG WITH OTHER PEOPLE: 0
8. MOVING OR SPEAKING SO SLOWLY THAT OTHER PEOPLE COULD HAVE NOTICED. OR THE OPPOSITE, BEING SO FIGETY OR RESTLESS THAT YOU HAVE BEEN MOVING AROUND A LOT MORE THAN USUAL: 0
SUM OF ALL RESPONSES TO PHQ QUESTIONS 1-9: 1
6. FEELING BAD ABOUT YOURSELF - OR THAT YOU ARE A FAILURE OR HAVE LET YOURSELF OR YOUR FAMILY DOWN: 0
7. TROUBLE CONCENTRATING ON THINGS, SUCH AS READING THE NEWSPAPER OR WATCHING TELEVISION: 0
5. POOR APPETITE OR OVEREATING: 1
SUM OF ALL RESPONSES TO PHQ QUESTIONS 1-9: 1

## 2023-02-07 ASSESSMENT — ENCOUNTER SYMPTOMS
GASTROINTESTINAL NEGATIVE: 1
EYES NEGATIVE: 1
RESPIRATORY NEGATIVE: 1

## 2023-02-07 NOTE — PROGRESS NOTES
[address]  [place] [address]  Dept: [phone]  Dept Fax: [phone]    Raquel Maharaj is a 34 y.o. female who presents today for her medicalconditions/complaints as noted below. Raquel Maharaj is c/o of Pre-op Exam    She needs clearance for D & C and myosure polypectomy. HPI:     Hypertension  This is a chronic problem. The current episode started more than 1 year ago. The problem is unchanged. The problem is controlled. Risk factors for coronary artery disease include obesity. Past treatments include lifestyle changes. The current treatment provides moderate improvement. Weight Management  This is a chronic problem. The current episode started more than 1 year ago. Nothing aggravates the symptoms. She has tried walking (diet modification) for the symptoms. The treatment provided mild relief. Edema  This is a recurrent problem. The current episode started more than 1 month ago. The problem occurs constantly. Nothing aggravates the symptoms. She has tried drinking (took medication in rthe past) for the symptoms. The treatment provided moderate relief.          Past Medical History:   Diagnosis Date    Class 3 severe obesity due to excess calories without serious comorbidity with body mass index (BMI) of 60.0 to 69.9 in adult [facility])     COVID-19 virus infection     Depression 05/01/2017    Essential hypertension     LASHAY (obstructive sleep apnea)         Current Outpatient Medications   Medication Sig Dispense Refill    furosemide (LASIX) 40 MG tablet Take 1 tablet by mouth daily as needed (edema) 20 tablet 0    megestrol (MEGACE) 20 MG tablet Take 1 tablet by mouth in the morning, at noon, and at bedtime (Patient not taking: Reported on 2/7/2023) 90 tablet 3    norethindrone (AYGESTIN) 5 MG tablet Take 1 tablet by mouth every 6 hours for 2 days, THEN 1 tablet every 8 (eight) hours for 2 days, THEN 1 tablet every 12 hours for 2 days, THEN 1 tablet daily for 7 days. 30 tablet 0     No current facility-administered medications for this visit. No Known Allergies    Health Maintenance   Topic Date Due    COVID-19 Vaccine (1) Never done    Depression Monitoring  06/21/2022    Flu vaccine (1) 08/01/2022    DTaP/Tdap/Td vaccine (5 - Td or Tdap) 03/01/2026    Cervical cancer screen  11/07/2027    Hib vaccine  Completed    Hepatitis C screen  Completed    HIV screen  Completed    Hepatitis A vaccine  Aged Out    Meningococcal (ACWY) vaccine  Aged Out    Pneumococcal 0-64 years Vaccine  Aged Out    Varicella vaccine  Discontinued       Subjective:      Review of Systems   Constitutional: Negative. HENT: Negative. Eyes: Negative. Respiratory: Negative. Cardiovascular: Negative. Gastrointestinal: Negative. Genitourinary: Negative. Musculoskeletal: Negative. Skin: Negative. Neurological: Negative. Psychiatric/Behavioral: Negative. Objective:     Physical Exam  Vitals reviewed. Constitutional:       Appearance: Normal appearance. She is well-developed. She is obese. HENT:      Head: Normocephalic and atraumatic. Eyes:      Conjunctiva/sclera: Conjunctivae normal.      Pupils: Pupils are equal, round, and reactive to light. Neck:      Thyroid: No thyromegaly. Vascular: No JVD. Cardiovascular:      Rate and Rhythm: Normal rate and regular rhythm. Heart sounds: S1 normal and S2 normal. No murmur heard. Pulmonary:      Effort: No respiratory distress. Breath sounds: Normal breath sounds. Abdominal:      General: Bowel sounds are normal.      Palpations: Abdomen is soft. There is no mass. Tenderness: There is no abdominal tenderness. Musculoskeletal:         General: No tenderness. Normal range of motion. Cervical back: Neck supple. Lymphadenopathy:      Cervical: No cervical adenopathy. Skin:     General: Skin is warm and dry.    Neurological:      Mental Status: She is alert and oriented to person, place, and time. Cranial Nerves: No cranial nerve deficit. Deep Tendon Reflexes: Reflexes are normal and symmetric. Psychiatric:         Behavior: Behavior normal.     /86 (Site: Right Lower Arm, Position: Sitting)   Pulse 84   SpO2 96%       Assessment:       Diagnosis Orders   1. Essential hypertension        2. Localized edema  furosemide (LASIX) 40 MG tablet      3. Morbid obesity ([address]            Plan:      No follow-ups on file. Orders Placed This Encounter   Medications    furosemide (LASIX) 40 MG tablet     Sig: Take 1 tablet by mouth daily as needed (edema)     Dispense:  20 tablet     Refill:  0     No orders of the defined types were placed in this encounter. Patient given educational materials - see patient instructions. Discussed use, benefit, and side effects of prescribed medications. All patientquestions answered. Pt voiced understanding.     Electronically signed by Darby Wakefield MD on 2/7/2023at 10:23 AM

## 2023-02-13 ENCOUNTER — TELEPHONE (OUTPATIENT)
Dept: OBGYN CLINIC | Age: 33
End: 2023-02-13

## 2023-02-13 NOTE — TELEPHONE ENCOUNTER
Patient states she has abn bleeding and she went to ER last week and was given medication to help it stop. Then she called office for more and was given something different. She states she had to stop that medication because of the side effects. She is asking for the medication she was given in ER to help stop bleeding. She knows Dr Candace Mcclellan isn't here until tomorrow.

## 2023-02-16 NOTE — TELEPHONE ENCOUNTER
Per Dr. Lanny Hernandez, pt can have aygestin 5mg daily. Pt notified that RX will be sent to pt. Pharmacy. Call sent to [facility] to schedule a f/u appt. Pt verbalized understanding.

## 2023-02-21 DIAGNOSIS — R60.0 LOCALIZED EDEMA: ICD-10-CM

## 2023-02-21 RX ORDER — FUROSEMIDE 40 MG/1
TABLET ORAL
Qty: 20 TABLET | Refills: 0 | Status: SHIPPED | OUTPATIENT
Start: 2023-02-21

## 2023-02-27 ENCOUNTER — TELEPHONE (OUTPATIENT)
Dept: OBGYN CLINIC | Age: 33
End: 2023-02-27

## 2023-03-01 ENCOUNTER — TELEMEDICINE (OUTPATIENT)
Dept: OBGYN CLINIC | Age: 33
End: 2023-03-01
Payer: MEDICAID

## 2023-03-01 DIAGNOSIS — D50.0 IRON DEFICIENCY ANEMIA DUE TO CHRONIC BLOOD LOSS: ICD-10-CM

## 2023-03-01 DIAGNOSIS — G47.33 OSA (OBSTRUCTIVE SLEEP APNEA): ICD-10-CM

## 2023-03-01 DIAGNOSIS — Z91.199 NON COMPLIANCE WITH MEDICAL TREATMENT: ICD-10-CM

## 2023-03-01 DIAGNOSIS — N94.89 ENDOMETRIAL MASS: ICD-10-CM

## 2023-03-01 DIAGNOSIS — N85.2 ENLARGED UTERUS: ICD-10-CM

## 2023-03-01 DIAGNOSIS — N92.1 MENORRHAGIA WITH IRREGULAR CYCLE: Primary | ICD-10-CM

## 2023-03-01 DIAGNOSIS — Z72.0 TOBACCO ABUSE: ICD-10-CM

## 2023-03-01 DIAGNOSIS — R93.89 ENDOMETRIAL THICKENING ON ULTRASOUND: ICD-10-CM

## 2023-03-01 DIAGNOSIS — I10 ESSENTIAL HYPERTENSION: ICD-10-CM

## 2023-03-01 DIAGNOSIS — N93.9 ABNORMAL UTERINE BLEEDING: ICD-10-CM

## 2023-03-01 PROCEDURE — 99213 OFFICE O/P EST LOW 20 MIN: CPT | Performed by: OBSTETRICS & GYNECOLOGY

## 2023-03-01 NOTE — PROGRESS NOTES
Geovanni Agudelo  3/1/2023    Geovanni Agudelo (:  1990) has requested an audio/video evaluation for the following concern(s):    1. Menorrhagia with irregular cycle    2. Abnormal uterine bleeding    3. Tobacco abuse    4. BMI 70 and over, adult ([facility] [address]    5. LASHAY (obstructive sleep apnea)    6. Essential hypertension    7. Iron deficiency anemia due to chronic blood loss    8. Endometrial thickening on ultrasound    9. Endometrial mass    10. Enlarged uterus    11. Non compliance with medical treatment          TELEHEALTH EVALUATION -- Audio/Visual (During IGDJL-07 public health emergency)      Geovanni Agudelo is a 34 y.o. female [id number]      She was here to follow-up regarding her labs and diagnostics ordered at her last visit for the diagnosis of:    ICD-10-CM    1. Menorrhagia with irregular cycle  N92.1       2. Abnormal uterine bleeding  N93.9       3. Tobacco abuse  Z72.0       4. BMI 70 and over, adult ([facility] [address]  Z68.45       5. LASHAY (obstructive sleep apnea)  G47.33       6. Essential hypertension  I10       7. Iron deficiency anemia due to chronic blood loss  D50.0       8. Endometrial thickening on ultrasound  R93.89       9. Endometrial mass  N94.89       10. Enlarged uterus  N85.2       11. Non compliance with medical treatment  Z91.199           Her bowels are regular and she is voiding without difficulty. She denies SOB, CP, Dizziness.       Past Medical History:   Diagnosis Date    Class 3 severe obesity due to excess calories without serious comorbidity with body mass index (BMI) of 60.0 to 69.9 in adult [facility])     COVID-19 virus infection     Depression 2017    Essential hypertension     LASHAY (obstructive sleep apnea)          Past Surgical History:   Procedure Laterality Date    WISDOM TOOTH EXTRACTION Left 2014         Family History   Problem Relation Age of Onset    Diabetes Mother          Social History     Tobacco Use    Smoking status: Former     Types: Cigars     Start date:  Quit date: 2021     Years since quittin.4    Smokeless tobacco: Never    Tobacco comments:     1-2 black and mild   Vaping Use    Vaping Use: Never used   Substance Use Topics    Alcohol use: Yes     Comment: Occasionally     Drug use: Not Currently     Types: Marijuana Syliva Madai)         MEDICATIONS:  Current Outpatient Medications   Medication Sig Dispense Refill    furosemide (LASIX) 40 MG tablet take 1 tablet by mouth once daily if needed for edema 20 tablet 0    norethindrone (AYGESTIN) 5 MG tablet Take 1 tablet by mouth daily 30 tablet 3    megestrol (MEGACE) 20 MG tablet Take 1 tablet by mouth in the morning, at noon, and at bedtime (Patient not taking: Reported on 2023) 90 tablet 3    norethindrone (AYGESTIN) 5 MG tablet Take 1 tablet by mouth every 6 hours for 2 days, THEN 1 tablet every 8 (eight) hours for 2 days, THEN 1 tablet every 12 hours for 2 days, THEN 1 tablet daily for 7 days. 30 tablet 0     No current facility-administered medications for this visit. ALLERGIES:  Allergies as of 2023    (No Known Allergies)         not currently breastfeeding. PE is limited due to the virtual visit    Abdomen: Soft non-tender; good bowel sounds. No guarding, rebound or rigidity. No CVA tenderness bilaterally reported when questioned. (Viewed Virtually)    Extremities: No calf tenderness, DTR 2/4, and No edema bilaterally as inspected by video and palpation by the patient (Viewed Virtually)    Pelvic: (Virtual Visit-Not Completed)    Diagnostics:  US NON OB TRANSVAGINAL     Result Date: 2023  EXAMINATION: PELVIC ULTRASOUND 2023 TECHNIQUE: Transvaginal pelvic ultrasound was performed. COMPARISON: None HISTORY: ORDERING SYSTEM PROVIDED HISTORY: AUB TECHNOLOGIST PROVIDED HISTORY: AUB      FINDINGS:   Uterus: The uterus is normal in shape and echogenicity with normal uterine length of about 12.2 cm.  Endometrium: The endometrial stripe is  measuring 34 mm thick with evidence of a endometrial mass measuring 2.3 x 2.4 cm. .   Ovaries:  Both ovaries are not visualized . No free fluid is noted. Nabothian cysts are seen      Abnormal thickening of the endometrium with evidence of a 2.3 x 2.4 cm mass in the endometrial cavity. RECOMMENDATION: Gyn consultation and consider follow-up MRI if indicated            Lab Results:        Results for orders placed or performed during the hospital encounter of 01/14/23   C.trachomatis N.gonorrhoeae DNA     Specimen: Cervix   Result Value Ref Range     Specimen Description . CERVIX       C. trachomatis DNA NEGATIVE NEGATIVE     N. gonorrhoeae DNA NEGATIVE NEGATIVE   Vaginitis DNA Probe     Specimen: Vaginal   Result Value Ref Range     Source . VAGINAL SWAB       Trichomonas Vaginalis DNA NEGATIVE NEGATIVE     Gardnerella Vaginalis, DNA Probe POSITIVE (A) NEGATIVE     Candida Species, DNA Probe NEGATIVE NEGATIVE   CBC with Auto Differential   Result Value Ref Range     WBC 10.2 3.5 - 11.3 k/uL     RBC 4.34 3.95 - 5.11 m/uL     Hemoglobin 10.8 (L) 11.9 - 15.1 g/dL     Hematocrit 35.4 (L) 36.3 - 47.1 %     MCV 81.6 (L) 82.6 - 102.9 fL     MCH 24.9 (L) 25.2 - 33.5 pg     MCHC 30.5 28.4 - 34.8 g/dL     RDW 17.5 (H) 11.8 - 14.4 %     Platelets 083 439 - 681 k/uL     MPV 10.8 8.1 - 13.5 fL     NRBC Automated 0.0 0.0 per 100 WBC     Seg Neutrophils 67 (H) 36 - 65 %     Lymphocytes 24 24 - 43 %     Monocytes 8 3 - 12 %     Eosinophils % 1 1 - 4 %     Basophils 0 0 - 2 %     Immature Granulocytes 0 0 %     Segs Absolute 6.77 1.50 - 8.10 k/uL     Absolute Lymph # 2.42 1.10 - 3.70 k/uL     Absolute Mono # 0.79 0.10 - 1.20 k/uL     Absolute Eos # 0.11 0.00 - 0.44 k/uL     Basophils Absolute 0.04 0.00 - 0.20 k/uL     Absolute Immature Granulocyte 0.03 0.00 - 0.30 k/uL     RBC Morphology ANISOCYTOSIS PRESENT     BMP   Result Value Ref Range     Glucose 105 (H) 70 - 99 mg/dL     BUN 7 6 - 20 mg/dL     Creatinine 0.72 0.50 - 0.90 mg/dL     Est, Glom Filt Rate >60 >60 mL/min/1.73m2     Calcium 8.4 (L) 8.6 - 10.4 mg/dL     Sodium 140 135 - 144 mmol/L     Potassium 4.1 3.7 - 5.3 mmol/L     Chloride 107 98 - 107 mmol/L     CO2 24 20 - 31 mmol/L     Anion Gap 9 9 - 17 mmol/L   Magnesium   Result Value Ref Range     Magnesium 1.9 1.6 - 2.6 mg/dL   HCG Qualitative, Serum   Result Value Ref Range     hCG Qual NEGATIVE NEGATIVE   BV treated per pt  Anemia treated peer pt           GYN Cytology  Order: [id number]  Status: Final result    Visible to patient: Yes (seen)    Next appt: None    1 Result Note    1 HM Topic  Component 11/7/22 0821    Cytology Report INTERPRETATION     Cervical material, (ThinPrep vial, Imaging-assisted review):   Specimen Adequacy:        Satisfactory for evaluation.        - Endocervical/transformation zone component present. Descriptive Diagnosis:        Negative for intraepithelial lesion or malignancy. Cytotechnologist:   Missy RUSH(ASCP)   **Electronically Signed Out**   ey/11/17/2022         Procedure/Addendum   HPV Procedure Report        Date Ordered:     11/8/2022     Status: Signed Out        Date Complete:     11/9/2022     By: System Interface        Date Reported:     11/9/2022              Sample:  HPV Type 16      Result:   Not Detected      Ref Range:   (Not Detected)   Sample:  HPV Type 18      Result:   Not Detected      Ref Range: (Not   Detected)   Sample: Other High Risk HPV      Result:   Not Detected      Ref   Range: (Not Detected)   Sample:  HPV Interp      Result:         Ref Range: (Not Detected)   This test amplifies and detects DNA of 14 high-risk HPV types   associated with cervical cancer and its precursor lesions    (HPV types 16,18, 31, 33, 35, 39, 45, 51, 52, 56, 58, 59, 66, and   68). Sensitivity may be affected by specimen collection methods, stage of   infection, and the presence of interfering    substances.  Results should be interpreted in conjunction with other   available laboratory and clinical data. A negative    high-risk HPV result does not exclude the possibility of future   cytologic HSIL or underlying CIN2-3 or cancer. This test is intended for medical purposes only and is not valid for   the evaluation of suspected sexual abuse or for    other forensic purposes. Lab Results:  Results for orders placed or performed during the hospital encounter of 01/14/23   C.trachomatis N.gonorrhoeae DNA    Specimen: Cervix   Result Value Ref Range    Specimen Description . CERVIX     C. trachomatis DNA NEGATIVE NEGATIVE    N. gonorrhoeae DNA NEGATIVE NEGATIVE   Vaginitis DNA Probe    Specimen: Vaginal   Result Value Ref Range    Source . VAGINAL SWAB     Trichomonas Vaginalis DNA NEGATIVE NEGATIVE    Gardnerella Vaginalis, DNA Probe POSITIVE (A) NEGATIVE    Candida Species, DNA Probe NEGATIVE NEGATIVE   CBC with Auto Differential   Result Value Ref Range    WBC 10.2 3.5 - 11.3 k/uL    RBC 4.34 3.95 - 5.11 m/uL    Hemoglobin 10.8 (L) 11.9 - 15.1 g/dL    Hematocrit 35.4 (L) 36.3 - 47.1 %    MCV 81.6 (L) 82.6 - 102.9 fL    MCH 24.9 (L) 25.2 - 33.5 pg    MCHC 30.5 28.4 - 34.8 g/dL    RDW 17.5 (H) 11.8 - 14.4 %    Platelets 837 489 - 015 k/uL    MPV 10.8 8.1 - 13.5 fL    NRBC Automated 0.0 0.0 per 100 WBC    Seg Neutrophils 67 (H) 36 - 65 %    Lymphocytes 24 24 - 43 %    Monocytes 8 3 - 12 %    Eosinophils % 1 1 - 4 %    Basophils 0 0 - 2 %    Immature Granulocytes 0 0 %    Segs Absolute 6.77 1.50 - 8.10 k/uL    Absolute Lymph # 2.42 1.10 - 3.70 k/uL    Absolute Mono # 0.79 0.10 - 1.20 k/uL    Absolute Eos # 0.11 0.00 - 0.44 k/uL    Basophils Absolute 0.04 0.00 - 0.20 k/uL    Absolute Immature Granulocyte 0.03 0.00 - 0.30 k/uL    RBC Morphology ANISOCYTOSIS PRESENT    BMP   Result Value Ref Range    Glucose 105 (H) 70 - 99 mg/dL    BUN 7 6 - 20 mg/dL    Creatinine 0.72 0.50 - 0.90 mg/dL    Est, Glom Filt Rate >60 >60 mL/min/1.73m2    Calcium 8.4 (L) 8.6 - 10.4 mg/dL    Sodium 140 135 - 144 mmol/L    Potassium 4.1 3.7 - 5.3 mmol/L    Chloride 107 98 - 107 mmol/L    CO2 24 20 - 31 mmol/L    Anion Gap 9 9 - 17 mmol/L   Magnesium   Result Value Ref Range    Magnesium 1.9 1.6 - 2.6 mg/dL   HCG Qualitative, Serum   Result Value Ref Range    hCG Qual NEGATIVE NEGATIVE     BV treated      Assessment:   Diagnosis Orders   1. Menorrhagia with irregular cycle        2. Abnormal uterine bleeding        3. Tobacco abuse        4. BMI 70 and over, adult ([address]        5. LASHAY (obstructive sleep apnea)        6. Essential hypertension        7. Iron deficiency anemia due to chronic blood loss        8. Endometrial thickening on ultrasound        9. Endometrial mass        10. Enlarged uterus        11. Non compliance with medical treatment          Chief Complaint   Patient presents with    Follow-up     Sx clearance review and follow up on current menses problem         Patient Active Problem List    Diagnosis Date Noted    LASHAY (obstructive sleep apnea) 01/27/2023     Priority: High    Essential hypertension 01/27/2023     Priority: High    Menorrhagia with irregular cycle 01/27/2023     Priority: High    Enlarged uterus 01/27/2023     Priority: High    Endometrial mass 01/27/2023     Priority: High     US NON OB TRANSVAGINAL    Result Date: 1/14/2023  EXAMINATION: PELVIC ULTRASOUND 1/14/2023 TECHNIQUE: Transvaginal pelvic ultrasound was performed. COMPARISON: None HISTORY: ORDERING SYSTEM PROVIDED HISTORY: AUB TECHNOLOGIST PROVIDED HISTORY: AUB     FINDINGS:   Uterus: The uterus is normal in shape and echogenicity with normal uterine length of about 12.2 cm. Endometrium: The endometrial stripe is  measuring 34 mm thick with evidence of a endometrial mass measuring 2.3 x 2.4 cm. .   Ovaries:  Both ovaries are not visualized . No free fluid is noted. Nabothian cysts are seen     Abnormal thickening of the endometrium with evidence of a 2.3 x 2.4 cm mass in the endometrial cavity.  RECOMMENDATION: Gyn consultation and consider follow-up MRI if indicated       Endometrial thickening on ultrasound 01/27/2023     Priority: High    BMI 70 and over, adult ([facility] [address] 01/27/2023     Priority: High    Iron deficiency anemia due to chronic blood loss 08/23/2021     Priority: High    Chronic hypertension (no meds) 12/14/2018     Priority: High     Refer to IM      Abnormal uterine bleeding 12/07/2018     Priority: Medium    Snoring     Witnessed episode of apnea     Sepsis with acute hypoxic respiratory failure without septic shock ([facility] [address] 08/23/2021    Vitamin D deficiency 08/23/2021    Pneumonia due to COVID-19 virus 08/22/2021    Marijuana abuse 12/07/2018    Secondary amenorrhea 06/07/2017    Depression 05/01/2017    Tobacco abuse 05/06/2016     Formatting of this note might be different from the original.  5/2016 Discussed cessation, risks of use on health, tobacco cessation aids   and tobacco cessation programs. Seasonal allergies 01/15/2015    Morbid obesity ([facility] [address] 09/04/2012    Contraceptive surveillance 12/11/2006    Dysplasia of cervix 09/06/2006     Formatting of this note might be different from the original.  8/2006 PAP with ASCUS +high risk  9/2006 colpo  12/06 pap BENITO I, LSIL, HPV  6/07 pap negative  9/07 pap negative  12/07 pap negative  5/2009 pap negative  4/2010 pap negative  11/7/2022 PAP WNL   Neg HPV      Attention deficit hyperactivity disorder (ADHD) 10/04/2000     Formatting of this note might be different from the original.  Attention deficit disorder with hyperactivity         PLAN:  Return in about 2 weeks (around 3/15/2023) for Surgical Boarding. Patient is non compliant regarding labs ordered from 1/27/2023- She states she will complete this week. Plan: D&C Hysteroscopy and Myosure Polypectomy or Myomectomy    Surgical clearance required (LASHAY, HTN, BMI)-Dr. Lesley Garsia did see pt and note reviewed but no clearance language mentioned and no recommendations seen.  My office will call his office to get amended letter this must be received before surgery boarding appt    Anesthesia consult for BMI before OR as well-At Time of PAT at their request  Return to the office in 2 weeks. Barrier recommendations and STD counseling was completed  Counseled on preventative health maintenance follow-up. No orders of the defined types were placed in this encounter. Rita Hill is a 34 y.o. female female was evaluated by a Virtual Visit (video visit) encounter to address concerns as mentioned above. A caregiver was present when appropriate. Due to this being a TeleHealth encounter (During [facility] public health emergency), evaluation of the following organ systems was limited: Vitals/Constitutional/EENT/Resp/CV/GI//MS/Neuro/Skin/Heme-Lymph-Imm. Pursuant to the emergency declaration under the [address] [place] [address] authority and the [employer] Act, this Virtual Visit was conducted with patient's (and/or legal guardian's) consent, to reduce the patient's risk of exposure to COVID-19 and provide necessary medical care. The patient (and/or legal guardian) has also been advised to contact this office for worsening conditions or problems, and seek emergency medical treatment and/or call 911 if deemed necessary. Services were provided through a video synchronous discussion virtually to substitute for in-person clinic visit. Patient and provider were located at their individual homes. Electronically signed by Berna Caro DO on 3/1/23 at 7:53 AM EST     An electronic signature was used to authenticate this note. The patient, Rita Hill is a 34 y.o. female, was seen with a total time spent of 20 minutes for the visit on this date of service by the E/M provider. The time component had both face to face and non face to face time spent in determining the total time component.   Counseling and education regarding her diagnosis listed below and her options regarding those diagnoses were also included in determining her time component. Diagnosis Orders   1. Menorrhagia with irregular cycle        2. Abnormal uterine bleeding        3. Tobacco abuse        4. BMI 70 and over, adult ([address]        5. LASHAY (obstructive sleep apnea)        6. Essential hypertension        7. Iron deficiency anemia due to chronic blood loss        8. Endometrial thickening on ultrasound        9. Endometrial mass        10. Enlarged uterus        11. Non compliance with medical treatment             The patient had her preventative health maintenance recommendations and follow-up reviewed with her at the completion of her visit.

## 2023-03-07 ENCOUNTER — HOSPITAL ENCOUNTER (OUTPATIENT)
Age: 33
Setting detail: SPECIMEN
Discharge: HOME | End: 2023-03-07

## 2023-03-07 DIAGNOSIS — N94.89 ENDOMETRIAL MASS: ICD-10-CM

## 2023-03-07 DIAGNOSIS — N92.1 MENORRHAGIA WITH IRREGULAR CYCLE: ICD-10-CM

## 2023-03-07 DIAGNOSIS — D50.0 IRON DEFICIENCY ANEMIA DUE TO CHRONIC BLOOD LOSS: ICD-10-CM

## 2023-03-07 LAB
ABSOLUTE EOS #: 0.05 K/UL (ref 0–0.44)
ABSOLUTE IMMATURE GRANULOCYTE: 0.03 K/UL (ref 0–0.3)
ABSOLUTE LYMPH #: 2.76 K/UL (ref 1.1–3.7)
ABSOLUTE MONO #: 0.49 K/UL (ref 0.1–1.2)
ALBUMIN SERPL-MCNC: 3.9 G/DL (ref 3.5–5.2)
ALBUMIN/GLOBULIN RATIO: 1.1 (ref 1–2.5)
ALP SERPL-CCNC: 58 U/L (ref 35–104)
ALT SERPL-CCNC: 23 U/L (ref 5–33)
ANION GAP SERPL CALCULATED.3IONS-SCNC: 14 MMOL/L (ref 9–17)
AST SERPL-CCNC: 21 U/L
BASOPHILS # BLD: 0 % (ref 0–2)
BASOPHILS ABSOLUTE: 0.03 K/UL (ref 0–0.2)
BILIRUB SERPL-MCNC: 0.2 MG/DL (ref 0.3–1.2)
BUN SERPL-MCNC: 9 MG/DL (ref 6–20)
CALCIUM SERPL-MCNC: 9 MG/DL (ref 8.6–10.4)
CANCER AG125 SERPL-ACNC: 11 U/ML
CHLORIDE SERPL-SCNC: 108 MMOL/L (ref 98–107)
CO2 SERPL-SCNC: 19 MMOL/L (ref 20–31)
CREAT SERPL-MCNC: 0.81 MG/DL (ref 0.5–0.9)
EOSINOPHILS RELATIVE PERCENT: 1 % (ref 1–4)
GFR SERPL CREATININE-BSD FRML MDRD: >60 ML/MIN/1.73M2
GLUCOSE SERPL-MCNC: 111 MG/DL (ref 70–99)
HCG QUANTITATIVE: <1 MIU/ML
HCT VFR BLD AUTO: 34.4 % (ref 36.3–47.1)
HGB BLD-MCNC: 10.8 G/DL (ref 11.9–15.1)
IMMATURE GRANULOCYTES: 0 %
INR PPP: 0.9
LYMPHOCYTES # BLD: 31 % (ref 24–43)
MCH RBC QN AUTO: 24.2 PG (ref 25.2–33.5)
MCHC RBC AUTO-ENTMCNC: 31.4 G/DL (ref 28.4–34.8)
MCV RBC AUTO: 77.1 FL (ref 82.6–102.9)
MONOCYTES # BLD: 5 % (ref 3–12)
NRBC AUTOMATED: 0 PER 100 WBC
PARTIAL THROMBOPLASTIN TIME: 22.2 SEC (ref 20.5–30.5)
PDW BLD-RTO: 17.4 % (ref 11.8–14.4)
PLATELET # BLD AUTO: 289 K/UL (ref 138–453)
PMV BLD AUTO: 11.4 FL (ref 8.1–13.5)
POTASSIUM SERPL-SCNC: 3.6 MMOL/L (ref 3.7–5.3)
PROT SERPL-MCNC: 7.5 G/DL (ref 6.4–8.3)
PROTHROMBIN TIME: 10.1 SEC (ref 9.1–12.3)
RBC # BLD: 4.46 M/UL (ref 3.95–5.11)
RBC # BLD: ABNORMAL 10*6/UL
SEG NEUTROPHILS: 63 % (ref 36–65)
SEGMENTED NEUTROPHILS ABSOLUTE COUNT: 5.64 K/UL (ref 1.5–8.1)
SODIUM SERPL-SCNC: 141 MMOL/L (ref 135–144)
TSH SERPL-ACNC: 1.98 UIU/ML (ref 0.3–5)
WBC # BLD AUTO: 9 K/UL (ref 3.5–11.3)

## 2023-03-08 ENCOUNTER — TELEPHONE (OUTPATIENT)
Dept: BARIATRICS/WEIGHT MGMT | Age: 33
End: 2023-03-08

## 2023-03-08 PROBLEM — F12.10 MARIJUANA ABUSE: Status: RESOLVED | Noted: 2018-12-07 | Resolved: 2023-03-08

## 2023-03-08 NOTE — TELEPHONE ENCOUNTER
----- Message from [facility], Julio Javier 56 sent at 3/1/2023  1:13 PM EST -----  Regarding: Restart  Patient last seen 2/2022 restarting program . Paid $100 toward pg fee . Informed $100 outstanding. Schedule 3/15/2023 . Needs insurance reverified has [place] [employer].

## 2023-03-14 ENCOUNTER — OFFICE VISIT (OUTPATIENT)
Dept: BARIATRICS/WEIGHT MGMT | Age: 33
End: 2023-03-14
Payer: MEDICAID

## 2023-03-14 VITALS
HEART RATE: 78 BPM | HEIGHT: 68 IN | DIASTOLIC BLOOD PRESSURE: 80 MMHG | WEIGHT: 293 LBS | SYSTOLIC BLOOD PRESSURE: 130 MMHG | BODY MASS INDEX: 44.41 KG/M2

## 2023-03-14 DIAGNOSIS — I10 ESSENTIAL HYPERTENSION: Primary | ICD-10-CM

## 2023-03-14 DIAGNOSIS — G47.33 OSA (OBSTRUCTIVE SLEEP APNEA): ICD-10-CM

## 2023-03-14 DIAGNOSIS — D50.0 IRON DEFICIENCY ANEMIA DUE TO CHRONIC BLOOD LOSS: ICD-10-CM

## 2023-03-14 PROCEDURE — 3075F SYST BP GE 130 - 139MM HG: CPT | Performed by: NURSE PRACTITIONER

## 2023-03-14 PROCEDURE — 99213 OFFICE O/P EST LOW 20 MIN: CPT | Performed by: NURSE PRACTITIONER

## 2023-03-14 PROCEDURE — 3079F DIAST BP 80-89 MM HG: CPT | Performed by: NURSE PRACTITIONER

## 2023-03-14 NOTE — PROGRESS NOTES
Medical Nutrition Therapy   Metabolic and Bariatric Surgery         Supervised diet and exercise preparation  Visit 1 out of 3    Vitals: Wt Readings from Last 3 Encounters:   03/14/23 (!) 458 lb (207.7 kg)   01/14/23 (!) 454 lb (205.9 kg)   11/07/22 (!) 468 lb (212.3 kg)         Nutrition Assessment:   PES: Knowledge deficit related to healthy behaviors that support weight management post weight loss surgery as evidenced by Body mass index is 70.67 kg/m². Nutrition Assessment of Goal Attainment:  TREATMENT GOALS:    1.TREATMENT GOALS FOR UPCOMING WEEK: continue all previous goals     All goals were planned with and agreed on by the patient. I want to improve my health because I want to be there for my kids. appt # NA G What is your next step? C 1 2 3 4 5 6   1 [] 1 I will read the education binder provided to me.    []         1 [] 2 I will make my pschological evaluation appoinment. [] 0        1 [] 3 I will bring my binder to every appointment. []         1 [] 4 I will eliminate all tobacco/nicotine. [] 0         [x] 5 I will limit alcoholic beverages to 1-8ZT per week. []          [] 6 I will limit dining out to 3 times per week or less. []          [] 7 I will eliminate sugary beverages. []          [] 8 I will eliminate carbonated beverages. []          [] 9 I will eliminate drinking with a straw. []          [] 10 I will limit caffeinated beverages to 16oz daily. []         1 [] 11 I will log my exercise daily. [] 0         [] 12 I will determine my calcium and mvi plan. []          [] 13 I will have 1-2 servings of lean protein present at each meal and minimeal. []          [] 14 I will eat every 3-5 hours. []          [x] 15 I will drink 64oz of fluid daily.  []          [] 16 I will eat slowly during meals and snacks. []          [] 17 I will limit fluids to 4oz before, after, and during meals. []          [] 18 I will eat protein first at all meals followed by vegetables, fruit, and lastly whole grains. []          [] 23 My first weight neutral approach is: []          [] 20 My second weight neutral approach is: []          [] 21  My Thirds weight neutral approach is: []               Questions asked for goal understanding:                                                  Do you understand your goals? Do you have the information you need to achieve your goals? Do you have any questions  right now? [x]  Consistent goal achievement in the program thus far and further success with goals is expected. []  Unable to consistently make progress in goal achievement. At this time patient is not moving forward  in developing the skills needed for success after surgery. Plan:    Continue to follow monthly and review goals.          [x]  Nutrition visits complete    []

## 2023-03-14 NOTE — PROGRESS NOTES
Medical Weight Management Progress Note    Subjective     Patient being seen for medically supervised weight loss for the chronic conditions of HTN, LASHAY, DIEGO. She is a restart in the surgical program.   She is working on the behavior changes discussed at the initial appointment. Patient continues on diet plan. Physical activity includes walking. Weight today is 458 lbs. Needs to schedule psych eval and EGD. No current issues. Working toward bariatric surgery:    [x] Sleeve Gastrectomy                                                           [] Jennifer-en-Y Gastric Bypass    Allergies:  No Known Allergies    Past Medical History:     Past Medical History:   Diagnosis Date    Class 3 severe obesity due to excess calories without serious comorbidity with body mass index (BMI) of 60.0 to 69.9 in adult [facility])     COVID-19 virus infection     Depression 2017    Essential hypertension     LASHAY (obstructive sleep apnea)    . Past Surgical History:  Past Surgical History:   Procedure Laterality Date    WISDOM TOOTH EXTRACTION Left        Family History:  Family History   Problem Relation Age of Onset    Diabetes Mother        Social History:  Social History     Socioeconomic History    Marital status: Single     Spouse name: Not on file    Number of children: Not on file    Years of education: Not on file    Highest education level: Not on file   Occupational History    Not on file   Tobacco Use    Smoking status: Former     Types: Cigars     Start date:      Quit date: 2021     Years since quittin.5    Smokeless tobacco: Never    Tobacco comments:     1-2 black and mild   Vaping Use    Vaping Use: Never used   Substance and Sexual Activity    Alcohol use: Yes     Comment: Occasionally     Drug use: Not Currently     Types: Marijuana Tala Coelho    Sexual activity: Yes     Partners: Male     Birth control/protection: I.U.D.    Other Topics Concern    Not on file   Social History Narrative    Not on file     Social Determinants of Health     Financial Resource Strain: Not on file   Food Insecurity: Not on file   Transportation Needs: Not on file   Physical Activity: Not on file   Stress: Not on file   Social Connections: Not on file   Intimate Partner Violence: Not on file   Housing Stability: Not on file       Current Medications:  Current Outpatient Medications   Medication Sig Dispense Refill    FEROSUL 325 (65 Fe) MG tablet       ibuprofen (ADVIL;MOTRIN) 600 MG tablet       furosemide (LASIX) 40 MG tablet take 1 tablet by mouth once daily if needed for edema 20 tablet 0    norethindrone (AYGESTIN) 5 MG tablet Take 1 tablet by mouth daily 30 tablet 3    norethindrone (AYGESTIN) 5 MG tablet Take 1 tablet by mouth every 6 hours for 2 days, THEN 1 tablet every 8 (eight) hours for 2 days, THEN 1 tablet every 12 hours for 2 days, THEN 1 tablet daily for 7 days. 30 tablet 0     No current facility-administered medications for this visit.       Vital Signs:  /80 (Site: Right Lower Arm, Position: Sitting, Cuff Size: Large Adult)   Pulse 78   Ht 5' 7.5\" (1.715 m)   Wt (!) 458 lb (207.7 kg)   LMP  (LMP Unknown)   BMI 70.67 kg/m²     BMI/Height/Weight:  Body mass index is 70.67 kg/m².      Review of Systems - A review of systems was performed.  All was negative unless otherwise documented in HPI.    Constitutional: Negative for fever, chills.   HENT: Negative for trouble swallowing.   Eyes: Negative for visual disturbance.   Respiratory: Negative for cough, shortness of breath.   Cardiovascular: Negative for chest pain and palpitations.   Gastrointestinal: Negative for nausea, vomiting, abdominal pain, diarrhea, constipation, blood in stool and abdominal distention.   Endocrine: Negative for polydipsia, polyphagia and polyuria.   Genitourinary: Negative for dysuria, frequency, hematuria and difficulty urinating.   Musculoskeletal: Negative for myalgias, joint swelling.   Skin: Negative for pallor and rash.  Neurological: Negative for dizziness, tremors, light-headedness and headaches. Psychiatric/Behavioral: Negative for sleep disturbance and dysphoric mood. Objective:      Physical Exam   Vital signs reviewed. General: Well-developed and well-nourished. No acute distress. Skin: Warm, dry and intact. HEENT: Normocephalic. EOMs intact. Conjunctivae normal. Neck supple. Cardiovascular: Normal rate, regular rhythm. Pulmonary/Chest: Normal effort. Lungs clear to auscultation. No rales, rhonchi or wheezing. Abdominal: Positive bowel sounds. Soft, nontender. Nondistended. Musculoskeletal: Movement x4. No edema. Neurological: Gait normal. Alert and oriented to person, place, and time. Psychiatric: Normal mood and affect. Speech and behavior normal. Judgment and thought content normal. Cognition and memory intact. Assessment:       Diagnosis Orders   1. Essential hypertension  Lipid Panel    Ferritin    Iron and TIBC    Hemoglobin A1C    Zinc    Vitamin A    Vitamin D 25 Hydroxy    Vitamin B12 & Folate    Vitamin B1    PTH, Intact    Nicotine, Blood    Urine Drug Screen      2. LASHAY (obstructive sleep apnea)  Lipid Panel    Ferritin    Iron and TIBC    Hemoglobin A1C    Zinc    Vitamin A    Vitamin D 25 Hydroxy    Vitamin B12 & Folate    Vitamin B1    PTH, Intact    Nicotine, Blood    Urine Drug Screen      3. BMI 70 and over, adult ([address]  Lipid Panel    Ferritin    Iron and TIBC    Hemoglobin A1C    Zinc    Vitamin A    Vitamin D 25 Hydroxy    Vitamin B12 & Folate    Vitamin B1    PTH, Intact    Nicotine, Blood    Urine Drug Screen      4. Iron deficiency anemia due to chronic blood loss  Lipid Panel    Ferritin    Iron and TIBC    Hemoglobin A1C    Zinc    Vitamin A    Vitamin D 25 Hydroxy    Vitamin B12 & Folate    Vitamin B1    PTH, Intact    Nicotine, Blood    Urine Drug Screen          Plan:    Dietitian visit today. Patient was encouraged to journal all food intake.  Keep calorie level at approximately 0037-6504. Protein intake is to be a minimum of 60-80 grams per day. Water drinking was encouraged with a goal of 64oz-128oz daily. Beverages to be calorie free except for milk. Every other beverage should be water. Avoid soda. Continue to increase level of physical activity. Encouraged use of exercise log. Patients will undergo thorough nutritional evaluation and counseling with our registered dietician. Our program provides long term nutritional counseling with unlimited consults with the dietician. All female patients have been educated on the importance of using reliable birth control and avoiding pregnancy the first 18 months - 2 years following bariatric surgery. All female patient will have a pregnancy test done with the pre-admission testing. All patients are nicotine tested and require a negative nicotine level prior to surgery. Patient has been educated about the risks associated with substance use, as well as the risks of using nicotine and alcohol after surgery. Patient has been educated that theses substances need to be avoided lifelong after surgery to reduce the risk of complications and sub-optimal weight loss. Bariatric pre-op labs ordered. Follow-up  Return in about 1 month (around 4/14/2023). Orders this encounter:  Orders Placed This Encounter   Procedures    Lipid Panel     Standing Status:   Future     Standing Expiration Date:   3/14/2024     Order Specific Question:   Is Patient Fasting?/# of Hours     Answer:   12    Ferritin     Standing Status:   Future     Standing Expiration Date:   3/14/2024    Iron and TIBC     Standing Status:   Future     Standing Expiration Date:   3/14/2024     Order Specific Question:   Is Patient Fasting? Answer:   no     Order Specific Question:   No of Hours?      Answer:   0    Hemoglobin A1C     Standing Status:   Future     Standing Expiration Date:   3/14/2024    Zinc    Vitamin A     Standing Status:   Future Standing Expiration Date:   3/14/2024    Vitamin D 25 Hydroxy     Standing Status:   Future     Standing Expiration Date:   3/14/2024    Vitamin B12 & Folate     Standing Status:   Future     Standing Expiration Date:   3/14/2024    Vitamin B1     Standing Status:   Future     Standing Expiration Date:   3/14/2024    PTH, Intact     Standing Status:   Future     Standing Expiration Date:   3/14/2024    Nicotine, Blood     Standing Status:   Future     Standing Expiration Date:   3/14/2024    Urine Drug Screen     Standing Status:   Future     Standing Expiration Date:   3/14/2024       Prescriptions this encounter:  No orders of the defined types were placed in this encounter.      Electronically signed by:  Jeni Hummel CNP

## 2023-03-27 ENCOUNTER — TELEPHONE (OUTPATIENT)
Dept: INTERNAL MEDICINE CLINIC | Age: 33
End: 2023-03-27

## 2023-03-27 NOTE — TELEPHONE ENCOUNTER
----- Message from Cassandra Gaines sent at 3/27/2023  9:34 AM EDT -----  Subject: Referral Request    Reason for referral request? Bariatric Surgery referral letter   Provider patient wants to be referred to(if known):     Provider Phone Number(if known): Additional Information for Provider? Patient was given a referral letter a   year ago for bariatric surgery but that letter has . Patient is   requesting a new letter.  Please call the patient to advise.   ---------------------------------------------------------------------------  --------------  Edda Carpenter INFO    [phone]; OK to leave message on voicemail  ---------------------------------------------------------------------------  --------------

## 2023-03-27 NOTE — TELEPHONE ENCOUNTER
I haven't seen this patient since 2021. It looks like she saw Dr Jerry Norton on 2/7/23 so you could see if he is willing to sign letter?  (Although it also looks like she saw bariatric doc on 3/14?)

## 2023-03-29 NOTE — TELEPHONE ENCOUNTER
Patient has an upcoming on May 9th with Esteban Arguello and would just like to wait until then to discuss with her

## 2023-04-04 ENCOUNTER — HOSPITAL ENCOUNTER (OUTPATIENT)
Age: 33
Discharge: HOME | End: 2023-04-04
Payer: MEDICAID

## 2023-04-04 ENCOUNTER — HOSPITAL ENCOUNTER (OUTPATIENT)
Dept: PREADMISSION TESTING | Age: 33
Discharge: HOME | End: 2023-04-08
Payer: MEDICAID

## 2023-04-04 ENCOUNTER — OFFICE VISIT (OUTPATIENT)
Dept: OBGYN CLINIC | Age: 33
End: 2023-04-04
Payer: MEDICAID

## 2023-04-04 ENCOUNTER — PREP FOR PROCEDURE (OUTPATIENT)
Dept: OBGYN CLINIC | Age: 33
End: 2023-04-04

## 2023-04-04 VITALS
BODY MASS INDEX: 44.41 KG/M2 | SYSTOLIC BLOOD PRESSURE: 124 MMHG | HEIGHT: 68 IN | DIASTOLIC BLOOD PRESSURE: 84 MMHG | WEIGHT: 293 LBS

## 2023-04-04 VITALS
RESPIRATION RATE: 18 BRPM | TEMPERATURE: 97.5 F | SYSTOLIC BLOOD PRESSURE: 158 MMHG | HEIGHT: 67 IN | HEART RATE: 82 BPM | OXYGEN SATURATION: 100 % | WEIGHT: 293 LBS | DIASTOLIC BLOOD PRESSURE: 110 MMHG | BODY MASS INDEX: 45.99 KG/M2

## 2023-04-04 DIAGNOSIS — N92.1 MENORRHAGIA WITH IRREGULAR CYCLE: ICD-10-CM

## 2023-04-04 DIAGNOSIS — Z01.818 PREOP TESTING: ICD-10-CM

## 2023-04-04 DIAGNOSIS — Z72.0 TOBACCO ABUSE: ICD-10-CM

## 2023-04-04 DIAGNOSIS — N85.2 ENLARGED UTERUS: ICD-10-CM

## 2023-04-04 DIAGNOSIS — G47.33 OSA (OBSTRUCTIVE SLEEP APNEA): ICD-10-CM

## 2023-04-04 DIAGNOSIS — N92.1 MENORRHAGIA WITH IRREGULAR CYCLE: Primary | ICD-10-CM

## 2023-04-04 DIAGNOSIS — N94.89 ENDOMETRIAL MASS: ICD-10-CM

## 2023-04-04 DIAGNOSIS — N93.9 ABNORMAL UTERINE BLEEDING: ICD-10-CM

## 2023-04-04 DIAGNOSIS — I10 ESSENTIAL HYPERTENSION: ICD-10-CM

## 2023-04-04 DIAGNOSIS — R93.89 ENDOMETRIAL THICKENING ON ULTRASOUND: ICD-10-CM

## 2023-04-04 DIAGNOSIS — D50.0 IRON DEFICIENCY ANEMIA DUE TO CHRONIC BLOOD LOSS: ICD-10-CM

## 2023-04-04 DIAGNOSIS — Z01.818 PREOP TESTING: Primary | ICD-10-CM

## 2023-04-04 LAB
25(OH)D3 SERPL-MCNC: 6.4 NG/ML
ABO/RH: NORMAL
ABSOLUTE EOS #: 0.1 K/UL (ref 0–0.4)
ABSOLUTE EOS #: 0.1 K/UL (ref 0–0.4)
ABSOLUTE LYMPH #: 2.6 K/UL (ref 1–4.8)
ABSOLUTE LYMPH #: 2.6 K/UL (ref 1–4.8)
ABSOLUTE MONO #: 0.5 K/UL (ref 0.1–1.3)
ABSOLUTE MONO #: 0.5 K/UL (ref 0.1–1.3)
ALBUMIN SERPL-MCNC: 3.5 G/DL (ref 3.5–5.2)
ALP SERPL-CCNC: 54 U/L (ref 35–104)
ALT SERPL-CCNC: 19 U/L (ref 5–33)
AMPHETAMINE SCREEN URINE: NEGATIVE
ANION GAP SERPL CALCULATED.3IONS-SCNC: 10 MMOL/L (ref 9–17)
ANION GAP SERPL CALCULATED.3IONS-SCNC: 10 MMOL/L (ref 9–17)
ANTIBODY SCREEN: NEGATIVE
ARM BAND NUMBER: NORMAL
AST SERPL-CCNC: 20 U/L
BACTERIA: ABNORMAL
BARBITURATE SCREEN URINE: NEGATIVE
BASOPHILS # BLD: 1 % (ref 0–2)
BASOPHILS # BLD: 2 % (ref 0–2)
BASOPHILS ABSOLUTE: 0.1 K/UL (ref 0–0.2)
BASOPHILS ABSOLUTE: 0.1 K/UL (ref 0–0.2)
BENZODIAZEPINE SCREEN, URINE: NEGATIVE
BILIRUB SERPL-MCNC: 0.4 MG/DL (ref 0.3–1.2)
BILIRUBIN URINE: NEGATIVE
BLOOD BANK COMMENT: NORMAL
BUN SERPL-MCNC: 9 MG/DL (ref 6–20)
BUN SERPL-MCNC: 9 MG/DL (ref 6–20)
CALCIUM SERPL-MCNC: 9 MG/DL (ref 8.6–10.4)
CALCIUM SERPL-MCNC: 9 MG/DL (ref 8.6–10.4)
CANNABINOID SCREEN URINE: NEGATIVE
CASTS UA: ABNORMAL /LPF
CHLORIDE SERPL-SCNC: 105 MMOL/L (ref 98–107)
CHLORIDE SERPL-SCNC: 105 MMOL/L (ref 98–107)
CHOLEST SERPL-MCNC: 155 MG/DL
CHOLESTEROL/HDL RATIO: 4.7
CO2 SERPL-SCNC: 25 MMOL/L (ref 20–31)
CO2 SERPL-SCNC: 25 MMOL/L (ref 20–31)
COCAINE METABOLITE, URINE: NEGATIVE
COLOR: YELLOW
CREAT SERPL-MCNC: 0.85 MG/DL (ref 0.5–0.9)
CREAT SERPL-MCNC: 0.85 MG/DL (ref 0.5–0.9)
EOSINOPHILS RELATIVE PERCENT: 1 % (ref 0–4)
EOSINOPHILS RELATIVE PERCENT: 1 % (ref 0–4)
EPITHELIAL CELLS UA: ABNORMAL /HPF
EXPIRATION DATE: NORMAL
FENTANYL URINE: NEGATIVE
FERRITIN SERPL-MCNC: 14 NG/ML (ref 13–150)
FOLATE SERPL-MCNC: 10 NG/ML
GFR SERPL CREATININE-BSD FRML MDRD: >60 ML/MIN/1.73M2
GFR SERPL CREATININE-BSD FRML MDRD: >60 ML/MIN/1.73M2
GLUCOSE SERPL-MCNC: 96 MG/DL (ref 70–99)
GLUCOSE SERPL-MCNC: 96 MG/DL (ref 70–99)
GLUCOSE UR STRIP.AUTO-MCNC: NEGATIVE MG/DL
HCG QUANTITATIVE: <1 MIU/ML
HCG QUANTITATIVE: <1 MIU/ML
HCT VFR BLD AUTO: 33.1 % (ref 36–46)
HCT VFR BLD AUTO: 33.2 % (ref 36–46)
HDLC SERPL-MCNC: 33 MG/DL
HGB BLD-MCNC: 10.3 G/DL (ref 12–16)
HGB BLD-MCNC: 10.5 G/DL (ref 12–16)
INR PPP: 1.1
IRON SATURATION: 9 % (ref 20–55)
IRON SERPL-MCNC: 32 UG/DL (ref 37–145)
KETONES UR STRIP.AUTO-MCNC: NEGATIVE MG/DL
LDLC SERPL CALC-MCNC: 104 MG/DL (ref 0–130)
LEUKOCYTE ESTERASE UR QL STRIP.AUTO: ABNORMAL
LYMPHOCYTES # BLD: 30 % (ref 24–44)
LYMPHOCYTES # BLD: 31 % (ref 24–44)
MCH RBC QN AUTO: 23.2 PG (ref 26–34)
MCH RBC QN AUTO: 23.8 PG (ref 26–34)
MCHC RBC AUTO-ENTMCNC: 31 G/DL (ref 31–37)
MCHC RBC AUTO-ENTMCNC: 31.6 G/DL (ref 31–37)
MCV RBC AUTO: 74.9 FL (ref 80–100)
MCV RBC AUTO: 75.3 FL (ref 80–100)
METHADONE SCREEN, URINE: NEGATIVE
MONOCYTES # BLD: 6 % (ref 1–7)
MONOCYTES # BLD: 6 % (ref 1–7)
NITRITE UR QL STRIP.AUTO: NEGATIVE
OPIATES, URINE: NEGATIVE
OXYCODONE SCREEN URINE: NEGATIVE
PARTIAL THROMBOPLASTIN TIME: 24.1 SEC (ref 24–36)
PDW BLD-RTO: 18.6 % (ref 11.5–14.9)
PDW BLD-RTO: 18.6 % (ref 11.5–14.9)
PHENCYCLIDINE, URINE: NEGATIVE
PLATELET # BLD AUTO: 268 K/UL (ref 150–450)
PLATELET # BLD AUTO: 298 K/UL (ref 150–450)
PMV BLD AUTO: 8.8 FL (ref 6–12)
PMV BLD AUTO: 9 FL (ref 6–12)
POTASSIUM SERPL-SCNC: 3.8 MMOL/L (ref 3.7–5.3)
POTASSIUM SERPL-SCNC: 3.8 MMOL/L (ref 3.7–5.3)
PROT SERPL-MCNC: 7.6 G/DL (ref 6.4–8.3)
PROT UR STRIP.AUTO-MCNC: 5.5 MG/DL (ref 5–8)
PROT UR STRIP.AUTO-MCNC: ABNORMAL MG/DL
PROTHROMBIN TIME: 14 SEC (ref 11.8–14.6)
PTH-INTACT SERPL-MCNC: 103.8 PG/ML (ref 14–72)
RBC # BLD: 4.41 M/UL (ref 4–5.2)
RBC # BLD: 4.43 M/UL (ref 4–5.2)
RBC CLUMPS #/AREA URNS AUTO: ABNORMAL /HPF
SEG NEUTROPHILS: 61 % (ref 36–66)
SEG NEUTROPHILS: 61 % (ref 36–66)
SEGMENTED NEUTROPHILS ABSOLUTE COUNT: 5.2 K/UL (ref 1.3–9.1)
SEGMENTED NEUTROPHILS ABSOLUTE COUNT: 5.3 K/UL (ref 1.3–9.1)
SODIUM SERPL-SCNC: 140 MMOL/L (ref 135–144)
SODIUM SERPL-SCNC: 140 MMOL/L (ref 135–144)
SPECIFIC GRAVITY UA: 1.02 (ref 1–1.03)
TEST INFORMATION: NORMAL
TIBC SERPL-MCNC: 356 UG/DL (ref 250–450)
TRIGL SERPL-MCNC: 88 MG/DL
TSH SERPL-ACNC: 1.75 UIU/ML (ref 0.3–5)
TURBIDITY: ABNORMAL
UNSATURATED IRON BINDING CAPACITY: 324 UG/DL (ref 112–347)
URINE HGB: NEGATIVE
UROBILINOGEN, URINE: NORMAL
VIT B12 SERPL-MCNC: 282 PG/ML (ref 232–1245)
WBC # BLD AUTO: 8.5 K/UL (ref 3.5–11)
WBC # BLD AUTO: 8.6 K/UL (ref 3.5–11)
WBC UA: ABNORMAL /HPF

## 2023-04-04 PROCEDURE — 80048 BASIC METABOLIC PNL TOTAL CA: CPT

## 2023-04-04 PROCEDURE — 82728 ASSAY OF FERRITIN: CPT

## 2023-04-04 PROCEDURE — 80307 DRUG TEST PRSMV CHEM ANLYZR: CPT

## 2023-04-04 PROCEDURE — 84702 CHORIONIC GONADOTROPIN TEST: CPT

## 2023-04-04 PROCEDURE — 85610 PROTHROMBIN TIME: CPT

## 2023-04-04 PROCEDURE — 87086 URINE CULTURE/COLONY COUNT: CPT

## 2023-04-04 PROCEDURE — 83036 HEMOGLOBIN GLYCOSYLATED A1C: CPT

## 2023-04-04 PROCEDURE — 86850 RBC ANTIBODY SCREEN: CPT

## 2023-04-04 PROCEDURE — 83550 IRON BINDING TEST: CPT

## 2023-04-04 PROCEDURE — 84630 ASSAY OF ZINC: CPT

## 2023-04-04 PROCEDURE — 3079F DIAST BP 80-89 MM HG: CPT | Performed by: OBSTETRICS & GYNECOLOGY

## 2023-04-04 PROCEDURE — 80061 LIPID PANEL: CPT

## 2023-04-04 PROCEDURE — 84590 ASSAY OF VITAMIN A: CPT

## 2023-04-04 PROCEDURE — 93005 ELECTROCARDIOGRAM TRACING: CPT | Performed by: ANESTHESIOLOGY

## 2023-04-04 PROCEDURE — 84425 ASSAY OF VITAMIN B-1: CPT

## 2023-04-04 PROCEDURE — 86900 BLOOD TYPING SEROLOGIC ABO: CPT

## 2023-04-04 PROCEDURE — 85025 COMPLETE CBC W/AUTO DIFF WBC: CPT

## 2023-04-04 PROCEDURE — 81001 URINALYSIS AUTO W/SCOPE: CPT

## 2023-04-04 PROCEDURE — 82746 ASSAY OF FOLIC ACID SERUM: CPT

## 2023-04-04 PROCEDURE — 36415 COLL VENOUS BLD VENIPUNCTURE: CPT

## 2023-04-04 PROCEDURE — 86901 BLOOD TYPING SEROLOGIC RH(D): CPT

## 2023-04-04 PROCEDURE — 80053 COMPREHEN METABOLIC PANEL: CPT

## 2023-04-04 PROCEDURE — 84443 ASSAY THYROID STIM HORMONE: CPT

## 2023-04-04 PROCEDURE — 82306 VITAMIN D 25 HYDROXY: CPT

## 2023-04-04 PROCEDURE — 85730 THROMBOPLASTIN TIME PARTIAL: CPT

## 2023-04-04 PROCEDURE — 83970 ASSAY OF PARATHORMONE: CPT

## 2023-04-04 PROCEDURE — 83540 ASSAY OF IRON: CPT

## 2023-04-04 PROCEDURE — 3074F SYST BP LT 130 MM HG: CPT | Performed by: OBSTETRICS & GYNECOLOGY

## 2023-04-04 PROCEDURE — 82607 VITAMIN B-12: CPT

## 2023-04-04 PROCEDURE — 99213 OFFICE O/P EST LOW 20 MIN: CPT | Performed by: OBSTETRICS & GYNECOLOGY

## 2023-04-04 PROCEDURE — G0480 DRUG TEST DEF 1-7 CLASSES: HCPCS

## 2023-04-04 RX ORDER — MEGESTROL ACETATE 20 MG/1
20 TABLET ORAL EVERY OTHER DAY
COMMUNITY

## 2023-04-04 RX ORDER — SODIUM CHLORIDE 9 MG/ML
INJECTION, SOLUTION INTRAVENOUS PRN
Status: CANCELLED | OUTPATIENT
Start: 2023-04-04

## 2023-04-04 RX ORDER — IBUPROFEN 800 MG/1
TABLET ORAL
COMMUNITY
Start: 2023-03-27

## 2023-04-04 RX ORDER — SODIUM CHLORIDE 0.9 % (FLUSH) 0.9 %
5-40 SYRINGE (ML) INJECTION PRN
Status: CANCELLED | OUTPATIENT
Start: 2023-04-04

## 2023-04-04 RX ORDER — SODIUM CHLORIDE 0.9 % (FLUSH) 0.9 %
5-40 SYRINGE (ML) INJECTION EVERY 12 HOURS SCHEDULED
Status: CANCELLED | OUTPATIENT
Start: 2023-04-04

## 2023-04-04 RX ORDER — SODIUM CHLORIDE, SODIUM LACTATE, POTASSIUM CHLORIDE, CALCIUM CHLORIDE 600; 310; 30; 20 MG/100ML; MG/100ML; MG/100ML; MG/100ML
INJECTION, SOLUTION INTRAVENOUS CONTINUOUS
Status: CANCELLED | OUTPATIENT
Start: 2023-04-04

## 2023-04-04 NOTE — DISCHARGE INSTRUCTIONS
Pre-op Instructions For Out-Patient Surgery    Medication Instructions:  Please stop herbs and any supplements now (includes vitamins and minerals). Please contact your surgeon and prescribing physician for pre-op instructions for any blood thinners. Ibuprofen as directed. If you have inhalers/aerosol treatments at home, please use them the morning of your surgery and bring the inhalers with you to the hospital.    Please take the following medications the morning of your surgery with a sip of water:    none    Surgery Instructions:  After midnight before surgery:  Do not eat or drink anything, including water, mints, gum, and hard candy. You may brush your teeth without swallowing. No smoking, chewing tobacco, or street drugs. Please shower or bathe before surgery. If you were given Surgical Scrub Chlorhexidine Gluconate Liquid (CHG), please shower the night before and the morning of your surgery following the detailed instructions you received during your pre-admission visit. Please do not wear any cologne, lotion, powder, deodorant, jewelry, piercings, perfume, makeup, nail polish, hair accessories, or hair spray on the day of surgery. Wear loose comfortable clothing. Leave your valuables at home. Bring a storage case for any glasses/contacts. An adult who is responsible for you MUST drive you home and should be with you for the first 24 hours after surgery. If having out-patient knee and foot surgeries, please arrange for planned crutches, walker, or wheelchair before arriving to the hospital.    The Day of Surgery:  Arrive at [address] [facility] Surgery Entrance at the time directed by your surgeon and check in at the desk. If you have a living will or healthcare power of , please bring a copy. You will be taken to the pre-op holding area where you will be prepared for surgery.   A physical assessment will be performed by a nurse

## 2023-04-04 NOTE — PROGRESS NOTES
0.50 - 0.90 mg/dL     Est, Glom Filt Rate >60 >60 mL/min/1.73m2     Calcium 8.4 (L) 8.6 - 10.4 mg/dL     Sodium 140 135 - 144 mmol/L     Potassium 4.1 3.7 - 5.3 mmol/L     Chloride 107 98 - 107 mmol/L     CO2 24 20 - 31 mmol/L     Anion Gap 9 9 - 17 mmol/L   Magnesium   Result Value Ref Range     Magnesium 1.9 1.6 - 2.6 mg/dL   HCG Qualitative, Serum   Result Value Ref Range     hCG Qual NEGATIVE NEGATIVE   BV treated per pt  Anemia treated peer pt           GYN Cytology  Order: [id number]  Status: Final result    Visible to patient: Yes (seen)    Next appt: None    1 Result Note    1  Topic  Component 11/7/22 0821    Cytology Report INTERPRETATION     Cervical material, (ThinPrep vial, Imaging-assisted review):   Specimen Adequacy:        Satisfactory for evaluation.        - Endocervical/transformation zone component present. Descriptive Diagnosis:        Negative for intraepithelial lesion or malignancy. Cytotechnologist:   Virgie RUSH(ASCP)   **Electronically Signed Out**   ey/11/17/2022         Procedure/Addendum   HPV Procedure Report        Date Ordered:     11/8/2022     Status: Signed Out        Date Complete:     11/9/2022     By: System Interface        Date Reported:     11/9/2022              Sample:  HPV Type 16      Result:   Not Detected      Ref Range:   (Not Detected)   Sample:  HPV Type 18      Result:   Not Detected      Ref Range: (Not   Detected)   Sample: Other High Risk HPV      Result:   Not Detected      Ref   Range: (Not Detected)   Sample:  HPV Interp      Result:         Ref Range: (Not Detected)   This test amplifies and detects DNA of 14 high-risk HPV types   associated with cervical cancer and its precursor lesions    (HPV types 16,18, 31, 33, 35, 39, 45, 51, 52, 56, 58, 59, 66, and   68). Sensitivity may be affected by specimen collection methods, stage of   infection, and the presence of interfering    substances.  Results should be interpreted in

## 2023-04-05 ENCOUNTER — TELEPHONE (OUTPATIENT)
Dept: OBGYN CLINIC | Age: 33
End: 2023-04-05

## 2023-04-05 DIAGNOSIS — E55.9 VITAMIN D DEFICIENCY: Primary | ICD-10-CM

## 2023-04-05 LAB
EKG ATRIAL RATE: 71 BPM
EKG P AXIS: 47 DEGREES
EKG P-R INTERVAL: 144 MS
EKG Q-T INTERVAL: 386 MS
EKG QRS DURATION: 88 MS
EKG QTC CALCULATION (BAZETT): 419 MS
EKG R AXIS: 36 DEGREES
EKG T AXIS: 2 DEGREES
EKG VENTRICULAR RATE: 71 BPM
EST. AVERAGE GLUCOSE BLD GHB EST-MCNC: 117 MG/DL
HBA1C MFR BLD: 5.7 % (ref 4–6)
MICROORGANISM SPEC CULT: NORMAL
SPECIMEN DESCRIPTION: NORMAL

## 2023-04-05 PROCEDURE — 93010 ELECTROCARDIOGRAM REPORT: CPT | Performed by: INTERNAL MEDICINE

## 2023-04-05 RX ORDER — ERGOCALCIFEROL 1.25 MG/1
50000 CAPSULE ORAL WEEKLY
Qty: 12 CAPSULE | Refills: 0 | Status: SHIPPED | OUTPATIENT
Start: 2023-04-05 | End: 2023-06-22

## 2023-04-05 RX ORDER — CEPHALEXIN 500 MG/1
500 CAPSULE ORAL 4 TIMES DAILY
Qty: 40 CAPSULE | Refills: 0 | Status: SHIPPED | OUTPATIENT
Start: 2023-04-05 | End: 2023-04-15

## 2023-04-05 NOTE — TELEPHONE ENCOUNTER
Patient notified of results and recommendations, [facility].

## 2023-04-05 NOTE — TELEPHONE ENCOUNTER
----- Message from Mahamed Mcfadden DO sent at 4/4/2023  7:03 PM EDT -----  Allergies as of 04/04/2023  (No Known Allergies)   - Fully Reviewed 04/04/2023    RX Keflex 500 mg po every 6 hrs x 10 days #40

## 2023-04-07 LAB
RETINYL PALMITATE: 0.04 MG/L (ref 0–0.1)
VITAMIN A LEVEL: 0.42 MG/L (ref 0.3–1.2)
VITAMIN A, INTERP: NORMAL
ZINC: 63.9 UG/DL (ref 60–120)

## 2023-04-08 LAB
COTININE: 58 NG/ML
NICOTINE: <5 NG/ML

## 2023-04-09 LAB — VIT B1 PYROPHOSHATE BLD-SCNC: 93 NMOL/L (ref 70–180)

## 2023-04-20 ENCOUNTER — OFFICE VISIT (OUTPATIENT)
Dept: BARIATRICS/WEIGHT MGMT | Age: 33
End: 2023-04-20
Payer: MEDICAID

## 2023-04-20 VITALS
WEIGHT: 293 LBS | SYSTOLIC BLOOD PRESSURE: 138 MMHG | HEART RATE: 80 BPM | BODY MASS INDEX: 45.99 KG/M2 | DIASTOLIC BLOOD PRESSURE: 88 MMHG | HEIGHT: 67 IN

## 2023-04-20 DIAGNOSIS — E66.01 MORBID OBESITY (HCC): ICD-10-CM

## 2023-04-20 DIAGNOSIS — I10 CHRONIC HYPERTENSION: ICD-10-CM

## 2023-04-20 DIAGNOSIS — R73.03 PREDIABETES: ICD-10-CM

## 2023-04-20 DIAGNOSIS — Z87.891 HISTORY OF NICOTINE USE: ICD-10-CM

## 2023-04-20 DIAGNOSIS — I10 ESSENTIAL HYPERTENSION: Primary | ICD-10-CM

## 2023-04-20 DIAGNOSIS — G47.33 OSA (OBSTRUCTIVE SLEEP APNEA): ICD-10-CM

## 2023-04-20 DIAGNOSIS — N92.1 MENORRHAGIA WITH IRREGULAR CYCLE: ICD-10-CM

## 2023-04-20 DIAGNOSIS — D50.0 IRON DEFICIENCY ANEMIA DUE TO CHRONIC BLOOD LOSS: ICD-10-CM

## 2023-04-20 PROCEDURE — 3075F SYST BP GE 130 - 139MM HG: CPT | Performed by: NURSE PRACTITIONER

## 2023-04-20 PROCEDURE — 99213 OFFICE O/P EST LOW 20 MIN: CPT | Performed by: NURSE PRACTITIONER

## 2023-04-20 PROCEDURE — 3079F DIAST BP 80-89 MM HG: CPT | Performed by: NURSE PRACTITIONER

## 2023-04-20 NOTE — PROGRESS NOTES
Medical Nutrition Therapy          Supervised Diet & Exercise Pre- op   Metabolic and Bariatric Surgery  Visit 2 out of 3    Nutrition Assessment:     Vitals: Wt Readings from Last 3 Encounters:   04/20/23 (!) 454 lb (205.9 kg)   04/04/23 (!) 458 lb (207.7 kg)   04/04/23 (!) 458 lb (207.7 kg)         Nutrition Diagnosis:  Knowledge deficit related to healthy behaviors that support weight management after weight loss surgery as evidenced by Body mass index is 72.18 kg/m². Nutrition Intervention:  Nutrition Prescription:  Bariatric Pre-op Diet    Nutrition Counseling:  Reviewed patients current behaviors and habits and discussed barriers for change. Utilized motivational interviewing to set patient specific goals that promote bariatric behaviors. Patient displays understanding of the goals discussed. Nutrition Education:   Reviewed and available in the Bariatric Education [facility]. Goals:  Changes in eating patterns to promote health are noted below on the goals number 19-22    1. Pt  Completed 4 out of 4 goals. 2. Continue all previous goals and add: # 6, 12, 13, 14, 18    All goals were planned with and agreed on by the patient. The patient has a list and explanation of every goal in their \"Bariatric Education Binder\". I want to improve my health because I want to be there for my kids. Goal C N/A Notes:   [] 1 I will read the education binder provided to me. [x] []    [] 2 I will make my psychological evaluation appoinment. [x] []    [x] 3 I will bring my binder to every appointment. [] []    [] 4 I will eliminate all tobacco/nicotine. [x] []    [] 5 I will limit alcoholic beverages to 0-1US per week. [] [x]    [x] 6 I will limit dining out to 3 times per week or less. [] []    [] 7 I will eliminate sugary beverages. [] [x]    [] 8 I will eliminate carbonated beverages.  [] [x]    [] 9 I will eliminate drinking with a straw. [] []    [] 10 I
and headaches. Psychiatric/Behavioral: Negative for sleep disturbance and dysphoric mood. Objective:      Physical Exam   Vital signs reviewed. General: Well-developed and well-nourished. No acute distress. Skin: Warm, dry and intact. HEENT: Normocephalic. EOMs intact. Conjunctivae normal. Neck supple. Cardiovascular: Normal rate, regular rhythm. Pulmonary/Chest: Normal effort. Lungs clear to auscultation. No rales, rhonchi or wheezing. Abdominal: Positive bowel sounds. Soft, nontender. Nondistended. Musculoskeletal: Movement x4. Bilateral lower extremtiy edema. Neurological: Gait normal. Alert and oriented to person, place, and time. Psychiatric: Normal mood and affect. Speech and behavior normal. Judgment and thought content normal. Cognition and memory intact. Assessment:       Diagnosis Orders   1. Essential hypertension        2. Prediabetes        3. LASHAY (obstructive sleep apnea)        4. Chronic hypertension (no meds)        5. Morbid obesity ([address]        6. Menorrhagia with irregular cycle        7. Iron deficiency anemia due to chronic blood loss            Plan:    Dietitian visit today. Patient was encouraged to journal all food intake. Keep calorie level at approximately 7942-5961. Protein intake is to be a minimum of 60-80 grams per day. Water drinking was encouraged with a goal of 64oz-128oz daily. Beverages to be calorie free except for milk. Every other beverage should be water. Avoid soda. Continue to increase level of physical activity. Encouraged use of exercise log. Patients will undergo thorough nutritional evaluation and counseling with our registered dietician. Our program provides long term nutritional counseling with unlimited consults with the dietician. All female patients have been educated on the importance of using reliable birth control and avoiding pregnancy the first 18 months - 2 years following bariatric surgery.   All female patient will have a

## 2023-04-21 ENCOUNTER — ANESTHESIA EVENT (OUTPATIENT)
Dept: OPERATING ROOM | Age: 33
End: 2023-04-21
Payer: MEDICAID

## 2023-04-24 ENCOUNTER — HOSPITAL ENCOUNTER (OUTPATIENT)
Age: 33
Setting detail: OUTPATIENT SURGERY
Discharge: HOME | End: 2023-04-24
Attending: OBSTETRICS & GYNECOLOGY | Admitting: OBSTETRICS & GYNECOLOGY
Payer: MEDICAID

## 2023-04-24 ENCOUNTER — ANESTHESIA (OUTPATIENT)
Dept: OPERATING ROOM | Age: 33
End: 2023-04-24
Payer: MEDICAID

## 2023-04-24 VITALS
BODY MASS INDEX: 45.99 KG/M2 | HEART RATE: 110 BPM | SYSTOLIC BLOOD PRESSURE: 127 MMHG | OXYGEN SATURATION: 98 % | HEIGHT: 67 IN | DIASTOLIC BLOOD PRESSURE: 84 MMHG | WEIGHT: 293 LBS | TEMPERATURE: 96.8 F | RESPIRATION RATE: 14 BRPM

## 2023-04-24 DIAGNOSIS — N94.89 ENDOMETRIAL MASS: ICD-10-CM

## 2023-04-24 DIAGNOSIS — N92.0 MENORRHAGIA WITH REGULAR CYCLE: ICD-10-CM

## 2023-04-24 DIAGNOSIS — R93.89 ENDOMETRIAL THICKENING ON ULTRASOUND: ICD-10-CM

## 2023-04-24 DIAGNOSIS — N85.2 BULKY OR ENLARGED UTERUS: ICD-10-CM

## 2023-04-24 PROBLEM — Z98.890 STATUS POST D&C: Status: ACTIVE | Noted: 2023-04-24

## 2023-04-24 LAB — HCG, PREGNANCY URINE (POC): NEGATIVE

## 2023-04-24 PROCEDURE — 6360000002 HC RX W HCPCS: Performed by: NURSE ANESTHETIST, CERTIFIED REGISTERED

## 2023-04-24 PROCEDURE — 58558 HYSTEROSCOPY BIOPSY: CPT | Performed by: OBSTETRICS & GYNECOLOGY

## 2023-04-24 PROCEDURE — 3700000001 HC ADD 15 MINUTES (ANESTHESIA): Performed by: OBSTETRICS & GYNECOLOGY

## 2023-04-24 PROCEDURE — 2580000003 HC RX 258: Performed by: ANESTHESIOLOGY

## 2023-04-24 PROCEDURE — 2580000003 HC RX 258: Performed by: OBSTETRICS & GYNECOLOGY

## 2023-04-24 PROCEDURE — 7100000011 HC PHASE II RECOVERY - ADDTL 15 MIN: Performed by: OBSTETRICS & GYNECOLOGY

## 2023-04-24 PROCEDURE — 2500000003 HC RX 250 WO HCPCS: Performed by: NURSE ANESTHETIST, CERTIFIED REGISTERED

## 2023-04-24 PROCEDURE — 7100000010 HC PHASE II RECOVERY - FIRST 15 MIN: Performed by: OBSTETRICS & GYNECOLOGY

## 2023-04-24 PROCEDURE — 7100000031 HC ASPR PHASE II RECOVERY - ADDTL 15 MIN: Performed by: OBSTETRICS & GYNECOLOGY

## 2023-04-24 PROCEDURE — 3600000003 HC SURGERY LEVEL 3 BASE: Performed by: OBSTETRICS & GYNECOLOGY

## 2023-04-24 PROCEDURE — 6370000000 HC RX 637 (ALT 250 FOR IP): Performed by: ANESTHESIOLOGY

## 2023-04-24 PROCEDURE — 81025 URINE PREGNANCY TEST: CPT

## 2023-04-24 PROCEDURE — 3700000000 HC ANESTHESIA ATTENDED CARE: Performed by: OBSTETRICS & GYNECOLOGY

## 2023-04-24 PROCEDURE — 3600000013 HC SURGERY LEVEL 3 ADDTL 15MIN: Performed by: OBSTETRICS & GYNECOLOGY

## 2023-04-24 PROCEDURE — 2709999900 HC NON-CHARGEABLE SUPPLY: Performed by: OBSTETRICS & GYNECOLOGY

## 2023-04-24 PROCEDURE — 7100000030 HC ASPR PHASE II RECOVERY - FIRST 15 MIN: Performed by: OBSTETRICS & GYNECOLOGY

## 2023-04-24 PROCEDURE — 88305 TISSUE EXAM BY PATHOLOGIST: CPT

## 2023-04-24 PROCEDURE — 2500000003 HC RX 250 WO HCPCS: Performed by: ANESTHESIOLOGY

## 2023-04-24 PROCEDURE — 7100000000 HC PACU RECOVERY - FIRST 15 MIN: Performed by: OBSTETRICS & GYNECOLOGY

## 2023-04-24 PROCEDURE — 7100000001 HC PACU RECOVERY - ADDTL 15 MIN: Performed by: OBSTETRICS & GYNECOLOGY

## 2023-04-24 RX ORDER — MIDAZOLAM HYDROCHLORIDE 1 MG/ML
INJECTION INTRAMUSCULAR; INTRAVENOUS PRN
Status: DISCONTINUED | OUTPATIENT
Start: 2023-04-24 | End: 2023-04-24 | Stop reason: SDUPTHER

## 2023-04-24 RX ORDER — CYCLOBENZAPRINE HCL 5 MG
5 TABLET ORAL 2 TIMES DAILY PRN
Qty: 10 TABLET | Refills: 0 | Status: SHIPPED | OUTPATIENT
Start: 2023-04-24 | End: 2023-05-04

## 2023-04-24 RX ORDER — SUCCINYLCHOLINE/SOD CL,ISO/PF 200MG/10ML
SYRINGE (ML) INTRAVENOUS PRN
Status: DISCONTINUED | OUTPATIENT
Start: 2023-04-24 | End: 2023-04-24 | Stop reason: SDUPTHER

## 2023-04-24 RX ORDER — ONDANSETRON 4 MG/1
4 TABLET, FILM COATED ORAL DAILY PRN
Qty: 30 TABLET | Refills: 0 | Status: SHIPPED | OUTPATIENT
Start: 2023-04-24

## 2023-04-24 RX ORDER — SODIUM CHLORIDE, SODIUM LACTATE, POTASSIUM CHLORIDE, CALCIUM CHLORIDE 600; 310; 30; 20 MG/100ML; MG/100ML; MG/100ML; MG/100ML
INJECTION, SOLUTION INTRAVENOUS CONTINUOUS
Status: DISCONTINUED | OUTPATIENT
Start: 2023-04-24 | End: 2023-04-24 | Stop reason: HOSPADM

## 2023-04-24 RX ORDER — GABAPENTIN 100 MG/1
300 CAPSULE ORAL 3 TIMES DAILY
Qty: 60 CAPSULE | Refills: 0 | Status: SHIPPED | OUTPATIENT
Start: 2023-04-24 | End: 2023-05-01

## 2023-04-24 RX ORDER — SODIUM CHLORIDE 9 MG/ML
INJECTION, SOLUTION INTRAVENOUS PRN
Status: DISCONTINUED | OUTPATIENT
Start: 2023-04-24 | End: 2023-04-24 | Stop reason: HOSPADM

## 2023-04-24 RX ORDER — SODIUM CHLORIDE 0.9 % (FLUSH) 0.9 %
5-40 SYRINGE (ML) INJECTION EVERY 12 HOURS SCHEDULED
Status: DISCONTINUED | OUTPATIENT
Start: 2023-04-24 | End: 2023-04-24 | Stop reason: HOSPADM

## 2023-04-24 RX ORDER — PROPOFOL 10 MG/ML
INJECTION, EMULSION INTRAVENOUS PRN
Status: DISCONTINUED | OUTPATIENT
Start: 2023-04-24 | End: 2023-04-24 | Stop reason: SDUPTHER

## 2023-04-24 RX ORDER — SODIUM CHLORIDE 0.9 % (FLUSH) 0.9 %
5-40 SYRINGE (ML) INJECTION PRN
Status: DISCONTINUED | OUTPATIENT
Start: 2023-04-24 | End: 2023-04-24 | Stop reason: HOSPADM

## 2023-04-24 RX ORDER — SENNA AND DOCUSATE SODIUM 50; 8.6 MG/1; MG/1
1 TABLET, FILM COATED ORAL DAILY
Qty: 30 TABLET | Refills: 0 | Status: SHIPPED | OUTPATIENT
Start: 2023-04-24

## 2023-04-24 RX ORDER — GABAPENTIN 300 MG/1
300 CAPSULE ORAL ONCE
Status: COMPLETED | OUTPATIENT
Start: 2023-04-24 | End: 2023-04-24

## 2023-04-24 RX ORDER — ONDANSETRON 2 MG/ML
INJECTION INTRAMUSCULAR; INTRAVENOUS PRN
Status: DISCONTINUED | OUTPATIENT
Start: 2023-04-24 | End: 2023-04-24 | Stop reason: SDUPTHER

## 2023-04-24 RX ORDER — ACETAMINOPHEN 500 MG
1000 TABLET ORAL ONCE
Status: COMPLETED | OUTPATIENT
Start: 2023-04-24 | End: 2023-04-24

## 2023-04-24 RX ORDER — ACETAMINOPHEN 500 MG
1000 TABLET ORAL 3 TIMES DAILY
Qty: 540 TABLET | Refills: 1 | Status: SHIPPED | OUTPATIENT
Start: 2023-04-24

## 2023-04-24 RX ORDER — LIDOCAINE HYDROCHLORIDE 10 MG/ML
1 INJECTION, SOLUTION EPIDURAL; INFILTRATION; INTRACAUDAL; PERINEURAL
Status: COMPLETED | OUTPATIENT
Start: 2023-04-24 | End: 2023-04-24

## 2023-04-24 RX ORDER — FENTANYL CITRATE 50 UG/ML
INJECTION, SOLUTION INTRAMUSCULAR; INTRAVENOUS PRN
Status: DISCONTINUED | OUTPATIENT
Start: 2023-04-24 | End: 2023-04-24 | Stop reason: SDUPTHER

## 2023-04-24 RX ORDER — OXYCODONE HYDROCHLORIDE AND ACETAMINOPHEN 5; 325 MG/1; MG/1
1 TABLET ORAL ONCE
Status: COMPLETED | OUTPATIENT
Start: 2023-04-24 | End: 2023-04-24

## 2023-04-24 RX ORDER — DEXAMETHASONE SODIUM PHOSPHATE 4 MG/ML
INJECTION, SOLUTION INTRA-ARTICULAR; INTRALESIONAL; INTRAMUSCULAR; INTRAVENOUS; SOFT TISSUE PRN
Status: DISCONTINUED | OUTPATIENT
Start: 2023-04-24 | End: 2023-04-24 | Stop reason: SDUPTHER

## 2023-04-24 RX ORDER — GLYCOPYRROLATE 0.2 MG/ML
INJECTION INTRAMUSCULAR; INTRAVENOUS PRN
Status: DISCONTINUED | OUTPATIENT
Start: 2023-04-24 | End: 2023-04-24 | Stop reason: SDUPTHER

## 2023-04-24 RX ADMIN — LIDOCAINE HYDROCHLORIDE 1 ML: 10 INJECTION, SOLUTION EPIDURAL; INFILTRATION; INTRACAUDAL; PERINEURAL at 09:49

## 2023-04-24 RX ADMIN — ACETAMINOPHEN 1000 MG: 500 TABLET ORAL at 09:58

## 2023-04-24 RX ADMIN — GABAPENTIN 300 MG: 300 CAPSULE ORAL at 09:58

## 2023-04-24 RX ADMIN — GLYCOPYRROLATE 0.2 MG: 0.2 INJECTION INTRAMUSCULAR; INTRAVENOUS at 11:56

## 2023-04-24 RX ADMIN — ONDANSETRON 4 MG: 2 INJECTION INTRAMUSCULAR; INTRAVENOUS at 11:49

## 2023-04-24 RX ADMIN — MIDAZOLAM 2 MG: 1 INJECTION INTRAMUSCULAR; INTRAVENOUS at 11:38

## 2023-04-24 RX ADMIN — Medication 160 MG: at 11:44

## 2023-04-24 RX ADMIN — PROPOFOL 300 MG: 10 INJECTION, EMULSION INTRAVENOUS at 11:43

## 2023-04-24 RX ADMIN — OXYCODONE HYDROCHLORIDE AND ACETAMINOPHEN 0.5 TABLET: 5; 325 TABLET ORAL at 13:35

## 2023-04-24 RX ADMIN — SODIUM CHLORIDE, POTASSIUM CHLORIDE, SODIUM LACTATE AND CALCIUM CHLORIDE: 600; 310; 30; 20 INJECTION, SOLUTION INTRAVENOUS at 09:49

## 2023-04-24 RX ADMIN — FENTANYL CITRATE 50 MCG: 50 INJECTION, SOLUTION INTRAMUSCULAR; INTRAVENOUS at 11:51

## 2023-04-24 RX ADMIN — DEXAMETHASONE SODIUM PHOSPHATE 4 MG: 4 INJECTION, SOLUTION INTRAMUSCULAR; INTRAVENOUS at 11:49

## 2023-04-24 RX ADMIN — FENTANYL CITRATE 50 MCG: 50 INJECTION, SOLUTION INTRAMUSCULAR; INTRAVENOUS at 11:43

## 2023-04-24 ASSESSMENT — PAIN SCALES - GENERAL
PAINLEVEL_OUTOF10: 5
PAINLEVEL_OUTOF10: 5
PAINLEVEL_OUTOF10: 0
PAINLEVEL_OUTOF10: 6
PAINLEVEL_OUTOF10: 8
PAINLEVEL_OUTOF10: 0

## 2023-04-24 ASSESSMENT — ENCOUNTER SYMPTOMS
SHORTNESS OF BREATH: 1
STRIDOR: 0
DYSPNEA ACTIVITY LEVEL: NO INTERVAL CHANGE

## 2023-04-24 ASSESSMENT — PAIN DESCRIPTION - PAIN TYPE
TYPE: CHRONIC PAIN
TYPE: CHRONIC PAIN
TYPE: SURGICAL PAIN

## 2023-04-24 ASSESSMENT — PAIN - FUNCTIONAL ASSESSMENT: PAIN_FUNCTIONAL_ASSESSMENT: NONE - DENIES PAIN

## 2023-04-24 ASSESSMENT — PAIN DESCRIPTION - DESCRIPTORS
DESCRIPTORS: SORE;ACHING
DESCRIPTORS: ACHING
DESCRIPTORS: ACHING

## 2023-04-24 ASSESSMENT — PAIN DESCRIPTION - LOCATION
LOCATION: HEAD
LOCATION: HEAD

## 2023-04-24 ASSESSMENT — LIFESTYLE VARIABLES: SMOKING_STATUS: 0

## 2023-04-24 NOTE — INTERVAL H&P NOTE
PRE OP NOTE  Gyn Surgery H&P Update    [facility]  Gynecologic Surgery  PHYSICIAN PRE-OPERATIVE NOTE:      Patient Name: Karsten Doherty  Patient : 1990  Room/Bed: Room/bed info not found  Admission Date/Time: No admission date for patient encounter. Primary Care Physician: Gordy Heimlich, APRN - CNP  MRN #: [id number]  [facility] #: [id number]        Date: 2023  Time: 4:48 PM    The patient was seen in pre-op holding. The procedure risks and complications were reviewed. The labs, Consent, and H&P were reviewed and updated. The patient was counseled on the possibility of  the need of a second surgery. The patient voiced understanding and had all of her questions answered. The possibility of incomplete removal of abnormal tissue was discussed. Past Medical History:   Diagnosis Date    Class 3 severe obesity due to excess calories without serious comorbidity with body mass index (BMI) of 60.0 to 69.9 in adult [facility])     COVID-19 virus infection     Depression 2017    Essential hypertension     LASHAY (obstructive sleep apnea)     has machine but doesn't use    SOB (shortness of breath) on exertion        Patient Active Problem List    Diagnosis Date Noted    LASHAY (obstructive sleep apnea) 2023     Priority: Medium    Essential hypertension 2023     Priority: Medium    Menorrhagia with irregular cycle 2023     Priority: Medium    Enlarged uterus 2023     Priority: Medium    Endometrial mass 2023     Priority: Medium     Overview Note:     US NON OB TRANSVAGINAL    Result Date: 2023  EXAMINATION: PELVIC ULTRASOUND 2023 TECHNIQUE: Transvaginal pelvic ultrasound was performed. COMPARISON: None HISTORY: ORDERING SYSTEM PROVIDED HISTORY: AUB TECHNOLOGIST PROVIDED HISTORY: AUB     FINDINGS:   Uterus: The uterus is normal in shape and echogenicity with normal uterine length of about 12.2 cm.  Endometrium: The endometrial stripe is  measuring 34 mm thick with

## 2023-04-24 NOTE — ANESTHESIA POSTPROCEDURE EVALUATION
POST- ANESTHESIA EVALUATION       Pt Name: Kang Knapp  MRN: [id number]  YOB: 1990  Date of evaluation: 4/24/2023  Time:  4:18 PM      /84   Pulse (!) 110   Temp 96.8 °F (36 °C) (Infrared)   Resp 14   Ht 5' 6.5\" (1.689 m)   Wt (!) 458 lb (207.7 kg)   SpO2 98%   BMI 72.82 kg/m²      Consciousness Level  Awake  Cardiopulmonary Status  Stable  Pain Adequately Treated YES  Nausea / Vomiting  NO  Adequate Hydration  YES  Anesthesia Related Complications NONE      Electronically signed by Julio C Tavares MD on 4/24/2023 at 4:18 PM       Department of Anesthesiology  Postprocedure Note    Patient: Kang nKapp  MRN: [id number]  [place]: 1990  Date of evaluation: 4/24/2023      Procedure Summary     Date: 04/24/23 Room / Location: [address] [place] [address]: [facility]    Anesthesia Start: 1138 Anesthesia Stop: 3013    Procedure: DILATATION AND CURETTAGE HYSTEROSCOPY WITH MYOSURE (Uterus) Diagnosis:       Menorrhagia with regular cycle      Bulky or enlarged uterus      Endometrial mass      Endometrial thickening on ultrasound      (MENORRHAGIA WITH REGULAR CYCLE)      (ENLARGED UTERUS)      (ENDOMETRIAL MASS)      (ENDOMETRIAL THICKENING ON ULTRASOUND)    Surgeons: Charli Celestin DO Responsible Provider: Julio C Tavares MD    Anesthesia Type: general ASA Status: 3          Anesthesia Type: No value filed.     Jhony Phase I: Jhony Score: 10    Jhony Phase II: Jhony Score: 10      Anesthesia Post Evaluation

## 2023-04-24 NOTE — ANESTHESIA PRE PROCEDURE
Department of Anesthesiology  Preprocedure Note       Name:  Avelina Kohli   Age:  34 y.o.  :  1990                                          MRN:  [id number]         Date:  2023      Surgeon: Chris Lujan):  Geri Ledesma DO    Procedure: Procedure(s):  DILATATION AND CURETTAGE HYSTEROSCOPY WITH MYOSURE POLYPECTOMY  MYOMECTOMY    Medications prior to admission:   Prior to Admission medications    Medication Sig Start Date End Date Taking? Authorizing Provider   acetaminophen (TYLENOL) 500 MG tablet Take 2 tablets by mouth 3 times daily 23  Yes Kathleen Stephen, DO   cyclobenzaprine (FLEXERIL) 5 MG tablet Take 1 tablet by mouth 2 times daily as needed for Muscle spasms 23 Yes Tali Russell,    gabapentin (NEURONTIN) 100 MG capsule Take 3 capsules by mouth 3 times daily for 7 days.  23 Yes Kathleen Stephen,    ondansetron (ZOFRAN) 4 MG tablet Take 1 tablet by mouth daily as needed for Nausea or Vomiting 23  Yes Kathleen Stephen, DO   sennosides-docusate sodium (SENOKOT-S) 8.6-50 MG tablet Take 1 tablet by mouth daily 23  Yes Kathleen Stephen, DO   vitamin D (ERGOCALCIFEROL) 1.25 MG (99289 UT) CAPS capsule Take 1 capsule by mouth once a week for 12 doses 23  [place] Thompson, APRN - CNP   ibuprofen (ADVIL;MOTRIN) 800 MG tablet take 1 tablet by mouth three times a day if needed for pain 3/27/23   Historical Provider, MD   megestrol (MEGACE) 20 MG tablet Take 1 tablet by mouth every other day    Historical Provider, MD   FEROSPEDRO PABLO 325 (65 Fe) MG tablet  23   Historical Provider, MD       Current medications:    Current Facility-Administered Medications   Medication Dose Route Frequency Provider Last Rate Last Admin    sodium chloride flush 0.9 % injection 5-40 mL  5-40 mL IntraVENous 2 times per day Yeny Durham MD        sodium chloride flush 0.9 % injection 5-40 mL  5-40 mL IntraVENous PRN Yeny Durham MD        0.9 % sodium chloride infusion   IntraVENous PRN

## 2023-04-24 NOTE — H&P
HISTORY & PHYSICAL PRE-OP UPDATE NOTE    Gynecologic Surgery Pre-Operative Attestation/update Note:  23       Facility: [address] [place] [address]  Date: 2023  Time: 11:07 AM      Patient Name: Srini Hickey  Patient : 1990  Room/Bed: [address] OR Pool/NONE  Admission Date/Time: 2023  9:21 AM  MRN #: [id number]  [facility] #: [id number]    Past Medical History:   Diagnosis Date    Class 3 severe obesity due to excess calories without serious comorbidity with body mass index (BMI) of 60.0 to 69.9 in adult [facility])     COVID-19 virus infection     Depression 2017    Essential hypertension     LASHAY (obstructive sleep apnea)     has machine but doesn't use    SOB (shortness of breath) on exertion          Past Surgical History:   Procedure Laterality Date    WISDOM TOOTH EXTRACTION Left          No current facility-administered medications on file prior to encounter. No current outpatient medications on file prior to encounter. Allergies as of 2023    (No Known Allergies)         REVIEW OF SYSTEMS (11 POINT):  Completed and unchanged from H&P completed less than 30 days ago. See EMR. Attending Physician Statement  I have discussed the care of Srini Hickey, including pertinent history and exam findings,  with the resident. The physical exam at the patient's workup outpatient appointment is unchanged. I have reviewed their note in the electronic medical record. I have seen and examined the patient in the pre-op holding area and the key elements of all parts of the encounter have been performed/reviewed by me . This was completed more than 15 minutes prior to the scheduled surgical start time per the new start time policy. I agree with the assessment, plan and orders as documented by the resident. The procedure risks and complications were discussed as well as the possibility of the need for a second surgery and incomplete removal of abnormal tissue.   The

## 2023-04-24 NOTE — OP NOTE
Gynecologic Operative Note      NAME: Srini Hickey   MRN: [id number]  CSN: [id number]  : 1990    PROCEDURE DATE: 2023     Pre-op Diagnosis: Pre-Op Diagnosis Codes:     * Menorrhagia with regular cycle [N92.0]     * Bulky or enlarged uterus [N85.2]     * Endometrial mass [N94.89]     * Endometrial thickening on ultrasound [R93.89]     Post-op Diagnosis: Same; submucosal fibroid anteriorly ~2.5-3 cm. Limitations of instrumentation d/t BMI    Procedure: Procedure(s):  DILATATION AND CURETTAGE HYSTEROSCOPY    Surgeon: Brandie Maldonado DO    Assistant:   Trevon Sprague DO PGY3      Circulator Documentation of Staff:  Surgeon(s) and Role:     * Brandie Maldonado DO - Primary    OR Staff:  * No surgical staff found *      Anesthesia Type: General  Anesthesia Staff: CRNA: Emilie Waldron, APRN - CRNA      Complications: None      Estimated Blood Loss: 5 ml  Total IV Fluids:  200 ml  Urine Output: 50 ml clear    Distention Media: NS (Accurate I/O at the completion of the procedure)    Specimens:   ID Type Source Tests Collected by Time Destination   A : ENDOMETRIAL CURRETTINGS Tissue Endometrium SURGICAL PATHOLOGY Brandie Maldonado DO 2023 1213      Implants: * No implants in log *    Drains: * No LDAs found *      Condition: Stable    Findings: AV uterus with no adnexal fullness BL. Limited exam d/t BMI over 70. Bladder smooth no mass effect. Hysteroscopically bl ostia identified and anterior suspected submucosal fibroid ~2.5-3 cm with vascular changes. Limited view d/t BMI and instrumentation. Large Cavity sound to 12.5 cm and inability to maintain clear visualization as instruments would not allow any further advancement. Decision was made to completed the curettage and forgo the Myosure attempt as if bleeding ensued this patient would assistants available.            Description of Procedure: (Understanding of limitations from template op-reports exist)  The patient was seen in the

## 2023-04-25 LAB — SURGICAL PATHOLOGY REPORT: NORMAL

## 2023-04-26 ENCOUNTER — TELEPHONE (OUTPATIENT)
Dept: OBGYN CLINIC | Age: 33
End: 2023-04-26

## 2023-04-26 NOTE — TELEPHONE ENCOUNTER
Pt calling in with c/o body achiness to upper body since surgery on Monday; instructed to take tylenol/motrin, warm shower, heating pad to affected areas. Pt then states \"is it normal to have trouble breathing? \"  Pt instructed to go to the ER if she is experiencing trouble breathing; pt verbalized understanding.

## 2023-05-09 ENCOUNTER — TELEPHONE (OUTPATIENT)
Dept: INTERNAL MEDICINE CLINIC | Age: 33
End: 2023-05-09

## 2023-05-12 ENCOUNTER — TELEMEDICINE (OUTPATIENT)
Dept: OBGYN CLINIC | Age: 33
End: 2023-05-12
Payer: MEDICAID

## 2023-05-12 DIAGNOSIS — N85.2 BULKY OR ENLARGED UTERUS: ICD-10-CM

## 2023-05-12 DIAGNOSIS — N92.1 MENORRHAGIA WITH IRREGULAR CYCLE: Primary | ICD-10-CM

## 2023-05-12 DIAGNOSIS — R93.89 ENDOMETRIAL THICKENING ON ULTRASOUND: ICD-10-CM

## 2023-05-12 DIAGNOSIS — Z72.0 TOBACCO ABUSE: ICD-10-CM

## 2023-05-12 DIAGNOSIS — D50.0 IRON DEFICIENCY ANEMIA DUE TO CHRONIC BLOOD LOSS: ICD-10-CM

## 2023-05-12 DIAGNOSIS — G47.33 OSA (OBSTRUCTIVE SLEEP APNEA): ICD-10-CM

## 2023-05-12 DIAGNOSIS — N93.9 ABNORMAL UTERINE BLEEDING: ICD-10-CM

## 2023-05-12 DIAGNOSIS — N85.2 ENLARGED UTERUS: ICD-10-CM

## 2023-05-12 DIAGNOSIS — N94.89 ENDOMETRIAL MASS: ICD-10-CM

## 2023-05-12 DIAGNOSIS — I10 ESSENTIAL HYPERTENSION: ICD-10-CM

## 2023-05-12 DIAGNOSIS — N94.6 DYSMENORRHEA: ICD-10-CM

## 2023-05-12 PROCEDURE — 99213 OFFICE O/P EST LOW 20 MIN: CPT | Performed by: OBSTETRICS & GYNECOLOGY

## 2023-05-12 RX ORDER — IBUPROFEN 800 MG/1
800 TABLET ORAL EVERY 8 HOURS PRN
Qty: 60 TABLET | Refills: 0 | Status: SHIPPED | OUTPATIENT
Start: 2023-05-12

## 2023-05-12 NOTE — PROGRESS NOTES
uterine bleeding        3. Tobacco abuse        4. BMI 70 and over, adult ([address]        5. LASHAY (obstructive sleep apnea)        6. Essential hypertension        7. Iron deficiency anemia due to chronic blood loss        8. Endometrial thickening on ultrasound        9. Enlarged uterus        10. Endometrial mass        11. Bulky or enlarged uterus        12. Dysmenorrhea  ibuprofen (ADVIL;MOTRIN) 800 MG tablet           Patient Active Problem List    Diagnosis Date Noted    Status post D&C Hysteroscopy 4/24/2023 (No Myosure d/t BMI and Limitations of instrumentation and visualization) 04/24/2023     Priority: High    LASHAY (obstructive sleep apnea) 01/27/2023     Priority: High    Essential hypertension 01/27/2023     Priority: High    Menorrhagia with irregular cycle 01/27/2023     Priority: High    Enlarged uterus 01/27/2023     Priority: High    Endometrial mass 2.5-3 cm submucosal fibroid 01/27/2023     Priority: High     US NON OB TRANSVAGINAL    Result Date: 1/14/2023  EXAMINATION: PELVIC ULTRASOUND 1/14/2023 TECHNIQUE: Transvaginal pelvic ultrasound was performed. COMPARISON: None HISTORY: ORDERING SYSTEM PROVIDED HISTORY: AUB TECHNOLOGIST PROVIDED HISTORY: AUB     FINDINGS:   Uterus: The uterus is normal in shape and echogenicity with normal uterine length of about 12.2 cm. Endometrium: The endometrial stripe is  measuring 34 mm thick with evidence of a endometrial mass measuring 2.3 x 2.4 cm. .   Ovaries:  Both ovaries are not visualized . No free fluid is noted. Nabothian cysts are seen     Abnormal thickening of the endometrium with evidence of a 2.3 x 2.4 cm mass in the endometrial cavity.  RECOMMENDATION: Gyn consultation and consider follow-up MRI if indicated       Endometrial thickening on ultrasound 01/27/2023     Priority: High    BMI 70 and over, adult ([address] 01/27/2023     Priority: High    Iron deficiency anemia due to chronic blood loss 08/23/2021     Priority: High    Chronic hypertension (no meds)

## 2023-05-16 ENCOUNTER — OFFICE VISIT (OUTPATIENT)
Dept: INTERNAL MEDICINE CLINIC | Age: 33
End: 2023-05-16
Payer: MEDICAID

## 2023-05-16 VITALS
OXYGEN SATURATION: 98 % | HEIGHT: 67 IN | SYSTOLIC BLOOD PRESSURE: 124 MMHG | BODY MASS INDEX: 45.99 KG/M2 | DIASTOLIC BLOOD PRESSURE: 80 MMHG | HEART RATE: 96 BPM | WEIGHT: 293 LBS

## 2023-05-16 DIAGNOSIS — R06.02 EXERTIONAL SHORTNESS OF BREATH: ICD-10-CM

## 2023-05-16 DIAGNOSIS — R60.0 BILATERAL LOWER EXTREMITY EDEMA: ICD-10-CM

## 2023-05-16 DIAGNOSIS — G47.33 OSA (OBSTRUCTIVE SLEEP APNEA): ICD-10-CM

## 2023-05-16 PROCEDURE — 3078F DIAST BP <80 MM HG: CPT | Performed by: NURSE PRACTITIONER

## 2023-05-16 PROCEDURE — 99214 OFFICE O/P EST MOD 30 MIN: CPT | Performed by: NURSE PRACTITIONER

## 2023-05-16 PROCEDURE — 3074F SYST BP LT 130 MM HG: CPT | Performed by: NURSE PRACTITIONER

## 2023-05-16 SDOH — ECONOMIC STABILITY: FOOD INSECURITY: WITHIN THE PAST 12 MONTHS, YOU WORRIED THAT YOUR FOOD WOULD RUN OUT BEFORE YOU GOT MONEY TO BUY MORE.: NEVER TRUE

## 2023-05-16 SDOH — ECONOMIC STABILITY: HOUSING INSECURITY
IN THE LAST 12 MONTHS, WAS THERE A TIME WHEN YOU DID NOT HAVE A STEADY PLACE TO SLEEP OR SLEPT IN A SHELTER (INCLUDING NOW)?: NO

## 2023-05-16 SDOH — ECONOMIC STABILITY: FOOD INSECURITY: WITHIN THE PAST 12 MONTHS, THE FOOD YOU BOUGHT JUST DIDN'T LAST AND YOU DIDN'T HAVE MONEY TO GET MORE.: NEVER TRUE

## 2023-05-16 SDOH — ECONOMIC STABILITY: INCOME INSECURITY: HOW HARD IS IT FOR YOU TO PAY FOR THE VERY BASICS LIKE FOOD, HOUSING, MEDICAL CARE, AND HEATING?: NOT HARD AT ALL

## 2023-05-16 ASSESSMENT — ENCOUNTER SYMPTOMS
ABDOMINAL PAIN: 0
SHORTNESS OF BREATH: 1
WHEEZING: 0
COUGH: 0
BACK PAIN: 1
DIARRHEA: 0
TROUBLE SWALLOWING: 0
CONSTIPATION: 1

## 2023-05-17 ENCOUNTER — OFFICE VISIT (OUTPATIENT)
Dept: BARIATRICS/WEIGHT MGMT | Age: 33
End: 2023-05-17
Payer: MEDICAID

## 2023-05-17 VITALS
WEIGHT: 293 LBS | HEART RATE: 90 BPM | BODY MASS INDEX: 45.99 KG/M2 | SYSTOLIC BLOOD PRESSURE: 138 MMHG | DIASTOLIC BLOOD PRESSURE: 88 MMHG | HEIGHT: 67 IN

## 2023-05-17 DIAGNOSIS — G47.33 OSA (OBSTRUCTIVE SLEEP APNEA): ICD-10-CM

## 2023-05-17 DIAGNOSIS — I10 ESSENTIAL HYPERTENSION: Primary | ICD-10-CM

## 2023-05-17 DIAGNOSIS — E66.01 MORBID OBESITY (HCC): ICD-10-CM

## 2023-05-17 DIAGNOSIS — Z87.891 HISTORY OF NICOTINE USE: ICD-10-CM

## 2023-05-17 PROCEDURE — 99213 OFFICE O/P EST LOW 20 MIN: CPT | Performed by: NURSE PRACTITIONER

## 2023-05-17 PROCEDURE — 3079F DIAST BP 80-89 MM HG: CPT | Performed by: NURSE PRACTITIONER

## 2023-05-17 PROCEDURE — 3075F SYST BP GE 130 - 139MM HG: CPT | Performed by: NURSE PRACTITIONER

## 2023-05-17 NOTE — PROGRESS NOTES
Medical Nutrition Therapy          Supervised Diet & Exercise Pre- op   Metabolic and Bariatric Surgery  Visit 3 out of 3    Nutrition Assessment:   Pt reports:  She is doing well with behavior changes; not drinking any SSB or carbonation. She is feeling well prepared for surgery today. Vitals: Wt Readings from Last 3 Encounters:   05/17/23 (!) 454 lb (205.9 kg)   05/16/23 (!) 458 lb (207.7 kg)   04/24/23 (!) 458 lb (207.7 kg)         Nutrition Diagnosis:  Knowledge deficit related to healthy behaviors that support weight management after weight loss surgery as evidenced by Body mass index is 72.18 kg/m². Nutrition Intervention:  Nutrition Prescription:  Bariatric Pre-op Diet    Nutrition Counseling:  Reviewed patients current behaviors and habits and discussed barriers for change. Utilized motivational interviewing to set patient specific goals that promote bariatric behaviors. Patient displays understanding of the goals discussed. Nutrition Education:   Reviewed and available in the Bariatric Education [address] Goals:  Changes in eating patterns to promote health are noted below on the goals number 19-22    1. Pt  Completed 11 out of 11 goals. 2. Continue all previous goals    All goals were planned with and agreed on by the patient. I want to improve my health because I want to be there for my kids. Goal C N/A Notes:   [] 1 I will read the education binder provided to me. [x] []    [] 2 I will make my psychological evaluation appoinment. [x] []    [] 3 I will bring my binder to every appointment. [x] []    [] 4 I will eliminate all tobacco/nicotine. [x] []    [] 5 I will limit alcoholic beverages to 4-8MZ per week. [] [x]    [] 6 I will limit dining out to 3 times per week or less. [x] []    [] 7 I will eliminate sugary beverages. [] [x]    [] 8 I will eliminate carbonated beverages. [] [x]    [] 9 I will eliminate drinking with a straw.
Sexual Activity    Alcohol use: Yes     Comment: Occasionally     Drug use: Not Currently     Types: Marijuana Allegra Jax)    Sexual activity: Yes     Partners: Male     Birth control/protection: I.U.D. Other Topics Concern    Not on file   Social History Narrative    Not on file     Social Determinants of Health     Financial Resource Strain: Low Risk     Difficulty of Paying Living Expenses: Not hard at all   Food Insecurity: No Food Insecurity    Worried About [address] in the Last Year: Never true    [address] [employer] in the Last Year: Never true   Transportation Needs: Unknown    Lack of Transportation (Medical): Not on file    Lack of Transportation (Non-Medical): No   Physical Activity: Not on file   Stress: Not on file   Social Connections: Not on file   Intimate Partner Violence: Not on file   Housing Stability: Unknown    Unable to Pay for Housing in the Last Year: Not on file    Number of Places Lived in the Last Year: Not on file    Unstable Housing in the Last Year: No       Current Medications:  Current Outpatient Medications   Medication Sig Dispense Refill    ibuprofen (ADVIL;MOTRIN) 800 MG tablet Take 1 tablet by mouth every 8 hours as needed for Pain (as needed for cramping with food) 60 tablet 0    acetaminophen (TYLENOL) 500 MG tablet Take 2 tablets by mouth 3 times daily 540 tablet 1    ondansetron (ZOFRAN) 4 MG tablet Take 1 tablet by mouth daily as needed for Nausea or Vomiting 30 tablet 0    sennosides-docusate sodium (SENOKOT-S) 8.6-50 MG tablet Take 1 tablet by mouth daily 30 tablet 0    vitamin D (ERGOCALCIFEROL) 1.25 MG (48816 UT) CAPS capsule Take 1 capsule by mouth once a week for 12 doses 12 capsule 0    megestrol (MEGACE) 20 MG tablet Take 1 tablet by mouth every other day      FEROSUL 325 (65 Fe) MG tablet       gabapentin (NEURONTIN) 100 MG capsule Take 3 capsules by mouth 3 times daily for 7 days. 60 capsule 0     No current facility-administered medications for this visit.

## 2023-05-23 ENCOUNTER — TELEPHONE (OUTPATIENT)
Dept: OBGYN CLINIC | Age: 33
End: 2023-05-23

## 2023-05-23 RX ORDER — FERROUS SULFATE 325(65) MG
325 TABLET ORAL 2 TIMES DAILY
Qty: 60 TABLET | Refills: 6 | Status: SHIPPED | OUTPATIENT
Start: 2023-05-23 | End: 2023-06-22

## 2023-05-23 NOTE — TELEPHONE ENCOUNTER
Pt requesting rx for iron supplement to be resent ( pt never picked up previously so needs new script)    [facility]

## 2023-07-01 DIAGNOSIS — E55.9 VITAMIN D DEFICIENCY: ICD-10-CM

## 2023-07-03 RX ORDER — ERGOCALCIFEROL 1.25 MG/1
CAPSULE ORAL
Qty: 12 CAPSULE | Refills: 0 | OUTPATIENT
Start: 2023-07-03

## 2024-04-30 ENCOUNTER — OFFICE VISIT (OUTPATIENT)
Dept: OBGYN CLINIC | Age: 34
End: 2024-04-30
Payer: MEDICAID

## 2024-04-30 VITALS
BODY MASS INDEX: 45.99 KG/M2 | SYSTOLIC BLOOD PRESSURE: 132 MMHG | DIASTOLIC BLOOD PRESSURE: 84 MMHG | HEART RATE: 92 BPM | WEIGHT: 293 LBS | HEIGHT: 67 IN | RESPIRATION RATE: 18 BRPM

## 2024-04-30 DIAGNOSIS — G47.33 OSA (OBSTRUCTIVE SLEEP APNEA): ICD-10-CM

## 2024-04-30 DIAGNOSIS — N85.2 ENLARGED UTERUS: ICD-10-CM

## 2024-04-30 DIAGNOSIS — D50.0 IRON DEFICIENCY ANEMIA DUE TO CHRONIC BLOOD LOSS: ICD-10-CM

## 2024-04-30 DIAGNOSIS — Z00.00 ENCOUNTER FOR WELL WOMAN EXAM WITHOUT GYNECOLOGICAL EXAM: Primary | ICD-10-CM

## 2024-04-30 DIAGNOSIS — N93.9 ABNORMAL UTERINE BLEEDING: ICD-10-CM

## 2024-04-30 DIAGNOSIS — I10 ESSENTIAL HYPERTENSION: ICD-10-CM

## 2024-04-30 DIAGNOSIS — Z72.0 TOBACCO ABUSE: ICD-10-CM

## 2024-04-30 PROCEDURE — 99213 OFFICE O/P EST LOW 20 MIN: CPT | Performed by: OBSTETRICS & GYNECOLOGY

## 2024-04-30 PROCEDURE — 99395 PREV VISIT EST AGE 18-39: CPT | Performed by: OBSTETRICS & GYNECOLOGY

## 2024-04-30 PROCEDURE — 3075F SYST BP GE 130 - 139MM HG: CPT | Performed by: OBSTETRICS & GYNECOLOGY

## 2024-04-30 PROCEDURE — 3079F DIAST BP 80-89 MM HG: CPT | Performed by: OBSTETRICS & GYNECOLOGY

## 2024-04-30 RX ORDER — MEGESTROL ACETATE 20 MG/1
20 TABLET ORAL 2 TIMES DAILY
Qty: 60 TABLET | Refills: 1 | Status: SHIPPED | OUTPATIENT
Start: 2024-04-30 | End: 2024-06-29

## 2024-04-30 NOTE — PROGRESS NOTES
(seen)       Next appt: None    1 Result Note       1 Patient Communication      Component 4/24/23 1213   Surgical Pathology Report -- Diagnosis --  Endometrium, curettage:  Polypoid fragments of weakly proliferative endometrium with stromal  decidual changes.  Neither atypia nor malignancy is seen.      WILLY Abebe.  **Electronically Signed Out**        /4/25/2023       Clinical Information  Pre-op Diagnosis:  MENORRHAGIA WITH REGULAR CYCLE, ENLARGED UTERUS,  ENDOMETRIAL MASS, ENDOMETRIAL THICKENING ON ULTRASOUND  Operative Findings:  ENDOMETRIAL CURETTINGS  Operation Performed:  DILATATION AND CURETTAGE HYSTEROSCOPY  tm    Source of Specimen  A: ENDOMETRIAL CURETTINGS    Gross Description  \"GINNY BRYANT, ENDOMETRIAL CURETTINGS\" Pink-red fragments, 2.5 x  2.0 x 0.2 cm in aggregate.  Entirely 1cs.  tm      Microscopic Description  2 H&E reviewed.  Microscopic examination performed.    SURGICAL PATHOLOGY CONSULTATION       Patient Name: GINNY BRYANT  [facility] Rec: [id number]  Path Number: [id number]    [place]  CONSULTING PATHOLOGISTS [facility]  ANATOMIC PATHOLOGY  [address] [place] [id number] [phone]  Fax: [phone]        Post op visit plan was:  I reviewed Bariatric options for weight loss and a MIS approach for hysterectomy. All questions answered. I discussed the limitations of the procedure due to length of instrumentation.  I reviewed the inability to remove the suspected polyp/myoma.  The pt does NOT wish hysterectomy and has completed 2/3 visits for Bariatric Surgery which is planned in the next 60 days. She wishes to fu in 6 months after her weight loss and re-attempt vaginal hysteroscopic resection of the uterine Polyp/Myoma.   She will continue her Megace which she takes 20 mg/dy. So she is not anemic.  She will c/w her FESO4      Today pt states she did not complete her bariatric surgery as [facility] stopped taking her [place] Medicaid. She is requesting

## 2024-05-07 NOTE — ED NOTES
Pt states she is on her menstrual cycle  Pt states she has a history of heavy mensis  Pt states she is going through 2-3 tampons per hour today  Pt denies nausea, vomiting  Pt denies diarrhea  Pt complains of abdominal pain and is taking Tylenol for the pain  Pt denies sexual activity.   Pt placed in [address] [facility]  51/74/38 4778 Orbital.../Triceps.../Fat overlying ribcage.../Buccal...

## 2024-08-26 ENCOUNTER — HOSPITAL ENCOUNTER (EMERGENCY)
Age: 34
Discharge: HOME | End: 2024-08-27
Attending: EMERGENCY MEDICINE
Payer: MEDICAID

## 2024-08-26 ENCOUNTER — APPOINTMENT (OUTPATIENT)
Dept: CT IMAGING | Age: 34
End: 2024-08-26
Payer: MEDICAID

## 2024-08-26 DIAGNOSIS — R51.9 NONINTRACTABLE HEADACHE, UNSPECIFIED CHRONICITY PATTERN, UNSPECIFIED HEADACHE TYPE: Primary | ICD-10-CM

## 2024-08-26 LAB
ANION GAP SERPL CALCULATED.3IONS-SCNC: 9 MMOL/L (ref 9–16)
BASOPHILS # BLD: 0.03 K/UL (ref 0–0.2)
BASOPHILS NFR BLD: 0 % (ref 0–2)
BUN SERPL-MCNC: 8 MG/DL (ref 6–20)
CALCIUM SERPL-MCNC: 8.7 MG/DL (ref 8.6–10.4)
CHLORIDE SERPL-SCNC: 110 MMOL/L (ref 98–107)
CO2 SERPL-SCNC: 22 MMOL/L (ref 20–31)
CREAT SERPL-MCNC: 0.8 MG/DL (ref 0.5–0.9)
CRP SERPL HS-MCNC: <3 MG/L (ref 0–5)
EOSINOPHIL # BLD: 0.12 K/UL (ref 0–0.44)
EOSINOPHILS RELATIVE PERCENT: 2 % (ref 1–4)
ERYTHROCYTE [DISTWIDTH] IN BLOOD BY AUTOMATED COUNT: 17.4 % (ref 11.8–14.4)
GFR, ESTIMATED: >90 ML/MIN/1.73M2
GLUCOSE SERPL-MCNC: 98 MG/DL (ref 74–99)
HCT VFR BLD AUTO: 39.4 % (ref 36.3–47.1)
HGB BLD-MCNC: 12.3 G/DL (ref 11.9–15.1)
IMM GRANULOCYTES # BLD AUTO: <0.03 K/UL (ref 0–0.3)
IMM GRANULOCYTES NFR BLD: 0 %
LYMPHOCYTES NFR BLD: 2.54 K/UL (ref 1.1–3.7)
LYMPHOCYTES RELATIVE PERCENT: 32 % (ref 24–43)
MCH RBC QN AUTO: 26.1 PG (ref 25.2–33.5)
MCHC RBC AUTO-ENTMCNC: 31.2 G/DL (ref 28.4–34.8)
MCV RBC AUTO: 83.5 FL (ref 82.6–102.9)
MONOCYTES NFR BLD: 0.58 K/UL (ref 0.1–1.2)
MONOCYTES NFR BLD: 7 % (ref 3–12)
NEUTROPHILS NFR BLD: 59 % (ref 36–65)
NEUTS SEG NFR BLD: 4.56 K/UL (ref 1.5–8.1)
NRBC BLD-RTO: 0 PER 100 WBC
PLATELET # BLD AUTO: 230 K/UL (ref 138–453)
PMV BLD AUTO: 10.9 FL (ref 8.1–13.5)
POTASSIUM SERPL-SCNC: 3.6 MMOL/L (ref 3.7–5.3)
RBC # BLD AUTO: 4.72 M/UL (ref 3.95–5.11)
RBC # BLD: ABNORMAL 10*6/UL
SODIUM SERPL-SCNC: 141 MMOL/L (ref 136–145)
WBC OTHER # BLD: 7.9 K/UL (ref 3.5–11.3)

## 2024-08-26 PROCEDURE — 85025 COMPLETE CBC W/AUTO DIFF WBC: CPT

## 2024-08-26 PROCEDURE — 96374 THER/PROPH/DIAG INJ IV PUSH: CPT | Performed by: EMERGENCY MEDICINE

## 2024-08-26 PROCEDURE — 85652 RBC SED RATE AUTOMATED: CPT

## 2024-08-26 PROCEDURE — 6370000000 HC RX 637 (ALT 250 FOR IP)

## 2024-08-26 PROCEDURE — 70450 CT HEAD/BRAIN W/O DYE: CPT

## 2024-08-26 PROCEDURE — 6360000002 HC RX W HCPCS

## 2024-08-26 PROCEDURE — 96372 THER/PROPH/DIAG INJ SC/IM: CPT | Performed by: EMERGENCY MEDICINE

## 2024-08-26 PROCEDURE — 99284 EMERGENCY DEPT VISIT MOD MDM: CPT | Performed by: EMERGENCY MEDICINE

## 2024-08-26 PROCEDURE — 86140 C-REACTIVE PROTEIN: CPT

## 2024-08-26 PROCEDURE — 80048 BASIC METABOLIC PNL TOTAL CA: CPT

## 2024-08-26 RX ORDER — DIPHENHYDRAMINE HCL 25 MG
25 TABLET ORAL ONCE
Status: COMPLETED | OUTPATIENT
Start: 2024-08-26 | End: 2024-08-26

## 2024-08-26 RX ORDER — FENTANYL CITRATE 50 UG/ML
50 INJECTION, SOLUTION INTRAMUSCULAR; INTRAVENOUS ONCE
Status: COMPLETED | OUTPATIENT
Start: 2024-08-26 | End: 2024-08-26

## 2024-08-26 RX ORDER — PROCHLORPERAZINE EDISYLATE 5 MG/ML
10 INJECTION INTRAMUSCULAR; INTRAVENOUS ONCE
Status: COMPLETED | OUTPATIENT
Start: 2024-08-26 | End: 2024-08-26

## 2024-08-26 RX ADMIN — FENTANYL CITRATE 50 MCG: 50 INJECTION, SOLUTION INTRAMUSCULAR; INTRAVENOUS at 23:05

## 2024-08-26 RX ADMIN — PROCHLORPERAZINE EDISYLATE 10 MG: 5 INJECTION INTRAMUSCULAR; INTRAVENOUS at 21:26

## 2024-08-26 RX ADMIN — DIPHENHYDRAMINE HYDROCHLORIDE 25 MG: 25 TABLET ORAL at 21:26

## 2024-08-26 ASSESSMENT — PAIN SCALES - GENERAL
PAINLEVEL_OUTOF10: 8
PAINLEVEL_OUTOF10: 8

## 2024-08-26 ASSESSMENT — PAIN DESCRIPTION - PAIN TYPE: TYPE: ACUTE PAIN

## 2024-08-26 ASSESSMENT — LIFESTYLE VARIABLES
HOW OFTEN DO YOU HAVE A DRINK CONTAINING ALCOHOL: MONTHLY OR LESS
HOW MANY STANDARD DRINKS CONTAINING ALCOHOL DO YOU HAVE ON A TYPICAL DAY: 1 OR 2

## 2024-08-26 ASSESSMENT — PAIN - FUNCTIONAL ASSESSMENT: PAIN_FUNCTIONAL_ASSESSMENT: 0-10

## 2024-08-26 ASSESSMENT — PAIN DESCRIPTION - LOCATION: LOCATION: HEAD

## 2024-08-27 VITALS
WEIGHT: 293 LBS | TEMPERATURE: 100 F | HEIGHT: 67 IN | HEART RATE: 95 BPM | RESPIRATION RATE: 22 BRPM | SYSTOLIC BLOOD PRESSURE: 158 MMHG | BODY MASS INDEX: 45.99 KG/M2 | OXYGEN SATURATION: 97 % | DIASTOLIC BLOOD PRESSURE: 88 MMHG

## 2024-08-27 LAB — ERYTHROCYTE [SEDIMENTATION RATE] IN BLOOD BY PHOTOMETRIC METHOD: 46 MM/HR (ref 0–20)

## 2024-08-27 PROCEDURE — 96375 TX/PRO/DX INJ NEW DRUG ADDON: CPT | Performed by: EMERGENCY MEDICINE

## 2024-08-27 PROCEDURE — 6360000002 HC RX W HCPCS

## 2024-08-27 RX ORDER — KETOROLAC TROMETHAMINE 30 MG/ML
30 INJECTION, SOLUTION INTRAMUSCULAR; INTRAVENOUS ONCE
Status: COMPLETED | OUTPATIENT
Start: 2024-08-27 | End: 2024-08-27

## 2024-08-27 RX ORDER — ACETAMINOPHEN 500 MG
1000 TABLET ORAL 3 TIMES DAILY
Qty: 30 TABLET | Refills: 0 | Status: SHIPPED | OUTPATIENT
Start: 2024-08-27 | End: 2024-09-01

## 2024-08-27 RX ORDER — KETOROLAC TROMETHAMINE 15 MG/ML
15 INJECTION, SOLUTION INTRAMUSCULAR; INTRAVENOUS ONCE
Status: DISCONTINUED | OUTPATIENT
Start: 2024-08-27 | End: 2024-08-27

## 2024-08-27 RX ORDER — IBUPROFEN 400 MG/1
400 TABLET, FILM COATED ORAL EVERY 6 HOURS PRN
Qty: 20 TABLET | Refills: 0 | Status: SHIPPED | OUTPATIENT
Start: 2024-08-27 | End: 2024-09-01

## 2024-08-27 RX ADMIN — KETOROLAC TROMETHAMINE 30 MG: 30 INJECTION, SOLUTION INTRAMUSCULAR; INTRAVENOUS at 02:18

## 2024-08-27 NOTE — ED NOTES
Patient arrived to the ED with c/o scalp pain and burning x1 week. Patient states she has been taking tylenol for the pain and it's not working. Patient denies any injury, denies any change in hair products/soaps. Patients vitals are stable. Patient ambulatory upon arrival to the ED.

## 2024-08-27 NOTE — DISCHARGE INSTRUCTIONS
You are seen here for headache, as well as focal head pain on the crown of your skull.    We have evaluated, including getting labs, getting a CT of the head, and if found that you do not have any acute issues going on at this time.    We have given you some medications for migraine, as well as medications for pain.    We recommend that you follow-up with your primary care provider soon as possible.    You may return to the emergency department for any new or worsening symptoms.  This includes fever and chills, nausea and vomiting, chest pain, changes in vision, lightheadedness, problems with walking or gait, or any other new or worsening symptoms.  You may also return if your symptoms do not improve.

## 2024-08-27 NOTE — ED PROVIDER NOTES
Faculty Sign-Out Attestation  Handoff taken on the following patient from prior Attending Physician: Lyn  Note Started: 12:03 AM EDT    I was available and discussed any additional care issues that arose and coordinated the management plans with the resident(s) caring for the patient during my duty period. Any areas of disagreement with resident’s documentation of care or procedures are noted on the chart. I was personally present for the key portions of any/all procedures during my duty period. I have documented in the chart those procedures where I was not present during the key portions.    Cephalgia,   Ct head pending, 2nd round meds,   If ct stable, >>> discharge    Ct -, will discharge per plan    Eric Lozano DO  Attending Physician       Eric Lozano DO  08/27/24 0004       Eric Lozano DO  08/27/24 0150

## 2024-08-27 NOTE — ED PROVIDER NOTES
Medication Sig Start Date End Date Taking? Authorizing Provider   acetaminophen (TYLENOL) 500 MG tablet Take 2 tablets by mouth 3 times daily for 5 days 8/27/24 9/1/24 Yes Toy Tapia MD   ibuprofen (IBU) 400 MG tablet Take 1 tablet by mouth every 6 hours as needed for Pain 8/27/24 9/1/24 Yes Toy Tapia MD   megestrol (MEGACE) 20 MG tablet Take 1 tablet by mouth 2 times daily 4/30/24 6/29/24  Kip Galindo,    acetaminophen (TYLENOL) 500 MG tablet Take 2 tablets by mouth 3 times daily 4/24/23   Tali Russell, DO         REVIEW OF SYSTEMS       Review of Systems  As noted in HPI  PHYSICAL EXAM      INITIAL VITALS:   BP (!) 158/88   Pulse 95   Temp 100 °F (37.8 °C) (Oral)   Resp 22   Ht 1.689 m (5' 6.5\")   Wt (!) 215.5 kg (475 lb)   SpO2 97%   BMI 75.52 kg/m²     Physical Exam  Constitutional:       Appearance: Normal appearance.   HENT:      Head:      Comments: There is an area of slight swelling of the parieto-occipital region.  There are no other lesions seen on the skin.  There is no redness, discharge, fully atraumatic.  Eyes:      Extraocular Movements: Extraocular movements intact.      Pupils: Pupils are equal, round, and reactive to light.   Cardiovascular:      Rate and Rhythm: Normal rate.   Pulmonary:      Effort: Pulmonary effort is normal. No respiratory distress.   Abdominal:      General: Abdomen is flat.      Palpations: Abdomen is soft.   Skin:     General: Skin is warm and dry.      Capillary Refill: Capillary refill takes less than 2 seconds.   Neurological:      Mental Status: She is alert.           DDX/DIAGNOSTIC RESULTS / EMERGENCY DEPARTMENT COURSE / MDM     Medical Decision Making  34-year-old female with no significant pertinent history, however does have history of hypertension and endometrial thickening and cervical dysplasia that presents with swelling of the parieto-occipital area of the head, described as burning, that is now migrating to being a  structural abnormalities.  Patient not significantly hypertensive, so there is not a worry for subarachnoid hemorrhage, and it is not under clap headache, but worsening.  Will also get basic labs, inflammatory markers to rule out very unlikely but possible giant cell arteritis. []   2326 CBC unremarkable [WH]   2343 CRP: <3.0 [WH]   2343 Potassium(!): 3.6  Mildly low potassium, otherwise normal labs []   Tue Aug 27, 2024   0004 Sed Rate, Automated(!): 46 [WH]   0119 CT HEAD WO CONTRAST  IMPRESSION:  No acute intracranial abnormality.  No intracranial hemorrhage.     Visualized paranasal sinuses are clear.   [WH]   0141 Discussed findings on head CT with radiologist.  On study 8 4, slight 42, on sagittal view, right with the patient has pain there is looked for me to be an abnormality, however after talking to radiologist to confirm that this is a normal finding, the radiologist stated this is most likely normal finding given the fact that there is branching to other vessels within the area.  This is most likely superficial vessel that is in the soft tissues overlying the skull. [WH]   0141 After reevaluation, the patient is stating that she is able to get sleep, and does not have a headache, however does have some pain on the area of the head when she does put her head against the pillow.  Otherwise, there are no complaints.  Patient continues to have no neurological signs.  Patient is medically stable to be discharged at this time. [WH]      ED Course User Index  [] Toy Tapia MD       PROCEDURES:      CONSULTS:  None    CRITICAL CARE:  There was significant risk of life threatening deterioration of patient's condition requiring my direct management. Critical care time 0 minutes, excluding any documented procedures.    FINAL IMPRESSION      1. Nonintractable headache, unspecified chronicity pattern, unspecified headache type          DISPOSITION / PLAN     DISPOSITION Decision To Discharge 08/27/2024

## 2024-08-27 NOTE — ED PROVIDER NOTES
Note Started: 10:06 PM EDT         [facility]     Emergency Department     Faculty Attestation    I performed a history and physical examination of the patient and discussed management with the resident. I reviewed the resident’s note and agree with the documented findings and plan of care. Any areas of disagreement are noted on the chart. I was personally present for the key portions of any procedures. I have documented in the chart those procedures where I was not present during the key portions. I have reviewed the emergency nurses triage note. I agree with the chief complaint, past medical history, past surgical history, allergies, medications, social and family history as documented unless otherwise noted below.        For Physician Assistant/ Nurse Practitioner cases/documentation I have personally evaluated this patient and have completed at least one if not all key elements of the E/M (history, physical exam, and MDM). Additional findings are as noted.  I have personally seen and evaluated the patient.  I find the patient's history and physical exam are consistent with the NP/PA documentation.  I agree with the care provided, treatment rendered, disposition and follow-up plan.    34-year-old presenting with area on the top of her head that is burning, causing a tension type headache.  Wears CPAP at night, but does not think the strap rubs in that area.  No recent new hair care products or head trauma.  No fever or chills.  No drainage from the head.  Has never had this before.    Exam:  General : Laying on the bed, awake, alert, and in no acute distress  CV : normal rate and regular rhythm  Lungs : Breathing comfortably on room air with no tachypnea, hypoxia, or increased work of breathing  HEENT: Small flesh-colored raised area at the crown of the head.  Pressing on this area reproduces pain.  No fluctuance or purulent drainage  Neuro: Awake, alert, moving all extremities.  No focal

## 2024-11-09 ENCOUNTER — APPOINTMENT (OUTPATIENT)
Dept: CT IMAGING | Age: 34
End: 2024-11-09
Payer: MEDICAID

## 2024-11-09 ENCOUNTER — HOSPITAL ENCOUNTER (EMERGENCY)
Age: 34
Discharge: HOME | End: 2024-11-09
Attending: STUDENT IN AN ORGANIZED HEALTH CARE EDUCATION/TRAINING PROGRAM
Payer: MEDICAID

## 2024-11-09 VITALS
DIASTOLIC BLOOD PRESSURE: 87 MMHG | HEIGHT: 66 IN | SYSTOLIC BLOOD PRESSURE: 145 MMHG | WEIGHT: 293 LBS | TEMPERATURE: 97.1 F | BODY MASS INDEX: 47.09 KG/M2 | HEART RATE: 79 BPM | RESPIRATION RATE: 18 BRPM | OXYGEN SATURATION: 97 %

## 2024-11-09 DIAGNOSIS — M25.551 RIGHT HIP PAIN: ICD-10-CM
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
## 2024-11-09 LAB — HCG SERPL QL: NEGATIVE

## 2024-11-09 PROCEDURE — 99284 EMERGENCY DEPT VISIT MOD MDM: CPT

## 2024-11-09 PROCEDURE — 72192 CT PELVIS W/O DYE: CPT

## 2024-11-09 PROCEDURE — 6370000000 HC RX 637 (ALT 250 FOR IP)

## 2024-11-09 PROCEDURE — 6360000002 HC RX W HCPCS

## 2024-11-09 PROCEDURE — 84703 CHORIONIC GONADOTROPIN ASSAY: CPT

## 2024-11-09 PROCEDURE — 96375 TX/PRO/DX INJ NEW DRUG ADDON: CPT

## 2024-11-09 PROCEDURE — 96374 THER/PROPH/DIAG INJ IV PUSH: CPT

## 2024-11-09 RX ORDER — IBUPROFEN 600 MG/1
600 TABLET, FILM COATED ORAL EVERY 6 HOURS PRN
Qty: 20 TABLET | Refills: 0 | Status: SHIPPED | OUTPATIENT
Start: 2024-11-09 | End: 2024-11-12

## 2024-11-09 RX ORDER — KETOROLAC TROMETHAMINE 15 MG/ML
15 INJECTION, SOLUTION INTRAMUSCULAR; INTRAVENOUS ONCE
Status: COMPLETED | OUTPATIENT
Start: 2024-11-09 | End: 2024-11-09

## 2024-11-09 RX ORDER — MORPHINE SULFATE 4 MG/ML
4 INJECTION INTRAVENOUS ONCE
Status: DISCONTINUED | OUTPATIENT
Start: 2024-11-09 | End: 2024-11-09

## 2024-11-09 RX ORDER — ACETAMINOPHEN 500 MG
500 TABLET ORAL 4 TIMES DAILY PRN
Qty: 20 TABLET | Refills: 0 | Status: SHIPPED | OUTPATIENT
Start: 2024-11-09 | End: 2024-11-14

## 2024-11-09 RX ORDER — LIDOCAINE 4 G/G
1 PATCH TOPICAL ONCE
Status: DISCONTINUED | OUTPATIENT
Start: 2024-11-09 | End: 2024-11-09 | Stop reason: HOSPADM

## 2024-11-09 RX ORDER — LIDOCAINE 4 G/G
1 PATCH TOPICAL DAILY
Qty: 5 EACH | Refills: 0 | Status: SHIPPED | OUTPATIENT
Start: 2024-11-09 | End: 2024-11-14

## 2024-11-09 RX ORDER — MORPHINE SULFATE 4 MG/ML
4 INJECTION INTRAVENOUS ONCE
Status: COMPLETED | OUTPATIENT
Start: 2024-11-09 | End: 2024-11-09

## 2024-11-09 RX ORDER — CYCLOBENZAPRINE HCL 5 MG
5 TABLET ORAL 2 TIMES DAILY PRN
Qty: 6 TABLET | Refills: 0 | Status: SHIPPED | OUTPATIENT
Start: 2024-11-09 | End: 2024-11-12

## 2024-11-09 RX ADMIN — MORPHINE SULFATE 4 MG: 4 INJECTION INTRAVENOUS at 10:20

## 2024-11-09 RX ADMIN — KETOROLAC TROMETHAMINE 15 MG: 15 INJECTION, SOLUTION INTRAMUSCULAR; INTRAVENOUS at 07:46

## 2024-11-09 ASSESSMENT — ENCOUNTER SYMPTOMS
COUGH: 0
ABDOMINAL PAIN: 0
SHORTNESS OF BREATH: 0

## 2024-11-09 ASSESSMENT — LIFESTYLE VARIABLES
HOW OFTEN DO YOU HAVE A DRINK CONTAINING ALCOHOL: NEVER
HOW MANY STANDARD DRINKS CONTAINING ALCOHOL DO YOU HAVE ON A TYPICAL DAY: PATIENT DOES NOT DRINK

## 2024-11-09 ASSESSMENT — PAIN SCALES - GENERAL
PAINLEVEL_OUTOF10: 8

## 2024-11-09 ASSESSMENT — PAIN DESCRIPTION - LOCATION
LOCATION: HIP
LOCATION: HIP

## 2024-11-09 ASSESSMENT — PAIN DESCRIPTION - ORIENTATION
ORIENTATION: RIGHT
ORIENTATION: LEFT

## 2024-11-09 ASSESSMENT — PAIN - FUNCTIONAL ASSESSMENT: PAIN_FUNCTIONAL_ASSESSMENT: 0-10

## 2024-11-09 NOTE — ED PROVIDER NOTES
file    Highest education level: Not on file   Occupational History    Not on file   Tobacco Use    Smoking status: Former     Types: Cigars     Start date:      Quit date: 2023     Years since quittin.6    Smokeless tobacco: Never    Tobacco comments:     1-2 black and mild   Vaping Use    Vaping status: Never Used   Substance and Sexual Activity    Alcohol use: Yes     Comment: Occasionally     Drug use: Not Currently     Types: Marijuana (Weed)    Sexual activity: Yes     Partners: Male     Birth control/protection: I.U.D.   Other Topics Concern    Not on file   Social History Narrative    Not on file     Social Determinants of Health     Financial Resource Strain: Low Risk  (2023)    Overall Financial Resource Strain (CARDIA)     Difficulty of Paying Living Expenses: Not hard at all   Food Insecurity: No Food Insecurity (3/26/2024)    Received from [facility], [facility]    Hunger Screening     Within the past 12 months we worried whether our food would run out before we got money to buy more.: Never True     Within the past 12 months the food we bought just didn't last and we didn't have money to get more.: Never True   Transportation Needs: Unknown (2023)    PRAPARE - Transportation     Lack of Transportation (Medical): Not on file     Lack of Transportation (Non-Medical): No   Physical Activity: Not on file   Stress: Not on file   Social Connections: Not on file   Intimate Partner Violence: Not on file   Housing Stability: Unknown (2023)    Housing Stability Vital Sign     Unable to Pay for Housing in the Last Year: Not on file     Number of Places Lived in the Last Year: Not on file     Unstable Housing in the Last Year: No       Family History   Problem Relation Age of Onset    Diabetes Mother        Allergies:  Patient has no known allergies.    Home Medications:  Prior to Admission medications    Medication Sig Start Date End Date Taking? Authorizing  pulses bilaterally  Sensations intact bilaterally  Power 5 out of 5 bilateral lower extremities.     Skin:     General: Skin is warm and dry.      Findings: No rash.   Neurological:      Mental Status: She is alert.           DDX/DIAGNOSTIC RESULTS / EMERGENCY DEPARTMENT COURSE / MDM     Medical Decision Making  54-year-old female presents to the ER with right hip pain after a fall that she had 2 weeks ago.  Her pain started 4 days ago.  She has been able to ambulate however pain is worsened by ambulation and weightbearing.  She has not taken any analgesics at home.  No back pain.  Patient reports pain does not radiate down her leg.  Pain is confined to the gluteal region and the lateral aspect of the hip.    Plan: Analgesics, CT of the pelvis to evaluate for acute traumatic injury    CT was negative, patient is able to ambulate in the department.  Return precautions discussed with the patient.    Amount and/or Complexity of Data Reviewed  Labs: ordered. Decision-making details documented in ED Course.  Radiology: ordered. Decision-making details documented in ED Course.    Risk  OTC drugs.  Prescription drug management.        EKG    All EKG's are interpreted by the Emergency Department Physician who either signs or Co-signs this chart in the absence of a cardiologist.    EMERGENCY DEPARTMENT COURSE:    ED Course as of 11/09/24 1844   Sat Nov 09, 2024   0821 Preg, Serum: NEGATIVE [HS]   1054 CT PELVIS WO CONTRAST Additional Contrast? None  No acute osseous or soft tissue abnormality.   [HS]      ED Course User Index  [HS] Angela Hicks MD       PROCEDURES:    CONSULTS:  None    CRITICAL CARE:  There was significant risk of life threatening deterioration of patient's condition requiring my direct management. Critical care time 0 minutes, excluding any documented procedures.    FINAL IMPRESSION      1. Fall, initial encounter    2. Right hip pain          DISPOSITION / PLAN     DISPOSITION Decision To Discharge

## 2024-11-09 NOTE — ED PROVIDER NOTES
[facility]     Emergency Department     Faculty Attestation    I performed a history and physical examination of the patient and discussed management with the resident. I have reviewed and agree with the resident’s findings including all diagnostic interpretations, and treatment plans as written at the time of my review. Any areas of disagreement are noted on the chart. I was personally present for the key portions of any procedures. I have documented in the chart those procedures where I was not present during the key portions. For Physician Assistant/ Nurse Practitioner cases/documentation I have personally evaluated this patient and have completed at least one if not all key elements of the E/M (history, physical exam, and MDM). Additional findings are as noted.    PtName: Quyen Mesa  MRN: [id number]  Birthdate 1990  Date of evaluation: 11/9/24  Note Started: 7:31 AM EST    Primary Care Physician: Nancy Birch APRN - CNP    Brief HPI:  34-year-old female presents emergency department with right hip pain status post fall.  She reports about 1 week ago she fell out of a chair onto her right hip.  She has had progressively worsening pain in the right gluteal region that radiates into her right groin.  Pain is worse with walking and weightbearing.    Pertinent Physical Exam Findings:  Vitals:    11/09/24 0724   BP: (!) 145/87   Pulse: 79   Resp: 18   Temp: 97.1 °F (36.2 °C)   SpO2: 97%   Tenderness to the soft tissues of the right superior gluteal region.  She is able to stand, with limited range of motion of the right hip.    Medical Decision Making: Patient is a 34 y.o. female presenting to the emergency department with right hip pain and gluteal pain status post fall. The chart was reviewed for pertinent history relating to the chief complaint.  CT of the pelvis will be obtained to evaluate for acute traumatic injury.     All results, including labs (if

## 2024-11-09 NOTE — DISCHARGE INSTRUCTIONS
Call today or tomorrow to follow up with Nancy Birch APRN - CNP  in 2 days. Please follow up with the orthopedic surgery clinic or present back to the ER if you continue to have pain.     Please note that you have been prescribed cyclobenzaprine/Flexeril which is a muscle relaxant, muscle relaxants can make you sleepy.  If you are taking the muscle relaxant you are advised not to drive or operate heavy machinery for the next 8 hours.    Use an ice pack or bag filled with ice and apply to the injured area 3 - 4 times a day for 15 - 20 minutes each time.  If the injury is older than 3 days, then use a heating pad to help relax the muscles in your back.    Use ibuprofen or Tylenol (unless prescribed medications that have Tylenol in it) for pain.  You can take over the counter Ibuprofen (advil) tablets (4 tablets every 8 hours or 3 tablets every 6 hours or 2 tablets every 4 hours)    Return to the Emergency Department for inability to move legs, worsening of pain, tingling / loss of sensation, any other care or concern.

## 2024-11-12 ENCOUNTER — HOSPITAL ENCOUNTER (EMERGENCY)
Age: 34
Discharge: HOME | End: 2024-11-12
Attending: EMERGENCY MEDICINE
Payer: MEDICAID

## 2024-11-12 VITALS
TEMPERATURE: 97 F | SYSTOLIC BLOOD PRESSURE: 175 MMHG | DIASTOLIC BLOOD PRESSURE: 93 MMHG | HEART RATE: 94 BPM | RESPIRATION RATE: 18 BRPM | OXYGEN SATURATION: 100 %

## 2024-11-12 DIAGNOSIS — M25.551 RIGHT HIP PAIN: Primary | ICD-10-CM

## 2024-11-12 PROCEDURE — 96372 THER/PROPH/DIAG INJ SC/IM: CPT

## 2024-11-12 PROCEDURE — 6370000000 HC RX 637 (ALT 250 FOR IP): Performed by: STUDENT IN AN ORGANIZED HEALTH CARE EDUCATION/TRAINING PROGRAM

## 2024-11-12 PROCEDURE — 6360000002 HC RX W HCPCS: Performed by: STUDENT IN AN ORGANIZED HEALTH CARE EDUCATION/TRAINING PROGRAM

## 2024-11-12 PROCEDURE — 99284 EMERGENCY DEPT VISIT MOD MDM: CPT

## 2024-11-12 RX ORDER — METHOCARBAMOL 500 MG/1
750 TABLET, FILM COATED ORAL ONCE
Status: COMPLETED | OUTPATIENT
Start: 2024-11-12 | End: 2024-11-12

## 2024-11-12 RX ORDER — OXYCODONE AND ACETAMINOPHEN 5; 325 MG/1; MG/1
1 TABLET ORAL ONCE
Status: COMPLETED | OUTPATIENT
Start: 2024-11-12 | End: 2024-11-12

## 2024-11-12 RX ORDER — OXYCODONE AND ACETAMINOPHEN 5; 325 MG/1; MG/1
1 TABLET ORAL EVERY 8 HOURS PRN
Qty: 8 TABLET | Refills: 0 | Status: SHIPPED | OUTPATIENT
Start: 2024-11-12 | End: 2024-11-15

## 2024-11-12 RX ORDER — IBUPROFEN 600 MG/1
600 TABLET, FILM COATED ORAL EVERY 8 HOURS PRN
Qty: 10 TABLET | Refills: 0 | Status: SHIPPED | OUTPATIENT
Start: 2024-11-12

## 2024-11-12 RX ORDER — KETOROLAC TROMETHAMINE 15 MG/ML
15 INJECTION, SOLUTION INTRAMUSCULAR; INTRAVENOUS ONCE
Status: COMPLETED | OUTPATIENT
Start: 2024-11-12 | End: 2024-11-12

## 2024-11-12 RX ADMIN — OXYCODONE HYDROCHLORIDE AND ACETAMINOPHEN 1 TABLET: 5; 325 TABLET ORAL at 20:59

## 2024-11-12 RX ADMIN — METHOCARBAMOL 750 MG: 500 TABLET ORAL at 20:16

## 2024-11-12 RX ADMIN — KETOROLAC TROMETHAMINE 15 MG: 15 INJECTION, SOLUTION INTRAMUSCULAR; INTRAVENOUS at 20:16

## 2024-11-12 ASSESSMENT — ENCOUNTER SYMPTOMS
COLOR CHANGE: 0
DIARRHEA: 0
SHORTNESS OF BREATH: 0
VOMITING: 0
BACK PAIN: 0
NAUSEA: 0
ABDOMINAL PAIN: 0
WHEEZING: 0
COUGH: 0

## 2024-11-12 ASSESSMENT — PAIN - FUNCTIONAL ASSESSMENT: PAIN_FUNCTIONAL_ASSESSMENT: 0-10

## 2024-11-12 ASSESSMENT — PAIN SCALES - GENERAL: PAINLEVEL_OUTOF10: 10

## 2024-11-13 ENCOUNTER — TELEPHONE (OUTPATIENT)
Dept: INTERNAL MEDICINE CLINIC | Age: 34
End: 2024-11-13

## 2024-11-13 NOTE — ED PROVIDER NOTES
[facility] ED     Emergency Department     Faculty Attestation    I performed a history and physical examination of the patient and discussed management with the resident. I reviewed the resident’s note and agree with the documented findings and plan of care. Any areas of disagreement are noted on the chart. I was personally present for the key portions of any procedures. I have documented in the chart those procedures where I was not present during the key portions. I have reviewed the emergency nurses triage note. I agree with the chief complaint, past medical history, past surgical history, allergies, medications, social and family history as documented unless otherwise noted below. For Physician Assistant/ Nurse Practitioner cases/documentation I have personally evaluated this patient and have completed at least one if not all key elements of the E/M (history, physical exam, and MDM). Additional findings are as noted.    8:17 PM EST    Patient presents with right hip pain radiating into her thigh that she has had for the past week.  Patient was seen for a fall for a few days ago and has had the pain to the hip since then.  A CT scan of the pelvis was done at that time which was unremarkable.  Patient says she has been using lidocaine patches and taking the pain medication that was prescribed to her without much relief.  Patient denies any new fall or new injury.  She denies any fevers.  She denies any changes in bowel or bladder habits.  Patient has no midline spinal tenderness.  There is tenderness over the right lateral hip.  Distal pulses and sensation are intact.  I do not feel that additional imaging is indicated at this time.  Will treat patient's pain      Yee Womack MD  Attending Emergency  Physician

## 2024-11-13 NOTE — ED NOTES
Pt presents to the ED with c/o R hip pain that radiates down her leg.   Pain is described as a sharp cramping like pain.   Pt was evaluated several days ago for similar complaints and was sent home with a muscle relaxer, motrin and tylenol. Pt states she has been taking them without any relief.   Pt appears uncomfortable, no distress noted. Pt answering questions appropriately and able to move from wheelchair to stretcher without assistance.   Call light within reach.

## 2024-11-13 NOTE — ED PROVIDER NOTES
[facility] ED  Emergency Department Encounter  Emergency Medicine Resident     Pt Name:Quyen Mesa  MRN: [id number]  Birthdate 1990  Date of evaluation: 11/12/24  PCP:  Nancy Birch APRN - CNP  Note Started: 8:03 PM EST      CHIEF COMPLAINT       Chief Complaint   Patient presents with    Hip Pain     Radiates down the leg to the knee; pt states pain feels similar to a charley horse       HISTORY OF PRESENT ILLNESS  (Location/Symptom, Timing/Onset, Context/Setting, Quality, Duration, Modifying Factors, Severity.)      Quyen Mesa is a 34 y.o. female with PMH including BMI of 79.64 who presents to the emergency department for persistent right hip pain patient was seen on 11/9/2024 for the same complaint after she had a fall 2 weeks ago and started having right hip pain.  Here in our emergency department on 11/9/2024 she obtained a CT pelvis without contrast which showed no osseous or soft tissue abnormality.  Negative serum pregnancy test at that time.  Patient was discharged home with multimodal pain management.  She returns tonight with persistent pain.  She denies further trauma, recurrent falls, fever, chills, skin color changes, numbness and tingling in any of her 4 extremities.  States that the pain is not \"worse\" however it is not improving.    PAST MEDICAL / SURGICAL / SOCIAL / FAMILY HISTORY      has a past medical history of Class 3 severe obesity due to excess calories without serious comorbidity with body mass index (BMI) of 60.0 to 69.9 in adult, COVID-19 virus infection, Depression, Essential hypertension, LASHAY (obstructive sleep apnea), and SOB (shortness of breath) on exertion.     has a past surgical history that includes Sharon tooth extraction (Left, 2014) and Dilation and curettage of uterus (N/A, 4/24/2023).      Social History     Socioeconomic History    Marital status: Single     Spouse name: Not on file    Number of children: Not on file    Years of  Percocet and Motrin and discharged home with strict return precautions and instructions to contact her primary care physician first thing tomorrow to be seen for physical therapy evaluation and potential imaging in the outpatient setting.  She is ambulating around the emergency department unassisted.  Medically stable to be discharged home.  Has appropriate follow-up.  Given appropriate return instructions [GC]      ED Course User Index  [GC] Isaiah Winters DO       PROCEDURES:  N/a    CONSULTS:  None    CRITICAL CARE:  There was significant risk of life threatening deterioration of patient's condition requiring my direct management. Critical care time 00 minutes, excluding any documented procedures.    FINAL IMPRESSION      1. Right hip pain          DISPOSITION / PLAN     DISPOSITION Decision To Discharge 11/12/2024 10:38:48 PM           PATIENT REFERRED TO:  Nancy Bicrh, APRN - CNP  [address] [id number]  [phone]    Call in 1 day  for ED follow-up      DISCHARGE MEDICATIONS:  Discharge Medication List as of 11/12/2024 10:41 PM        START taking these medications    Details   oxyCODONE-acetaminophen (PERCOCET) 5-325 MG per tablet Take 1 tablet by mouth every 8 hours as needed for Pain for up to 3 days. Intended supply: 3 days. Take lowest dose possible to manage pain Max Daily Amount: 3 tablets, Disp-8 tablet, R-0Print             Isaiah Winters DO  Emergency Medicine Resident    (Please note that portions of this note were completed with a voice recognition program.  Efforts were made to edit the dictations but occasionally words are mis-transcribed.)

## 2024-11-13 NOTE — TELEPHONE ENCOUNTER
----- Message from Geri MCMILLAN sent at 11/13/2024 10:18 AM EST -----  Regarding: [facility] Appointment Request  [facility] Appointment Request    Patient needs appointment for [facility] Appointment Type: ED Follow-Up.    Patient Requested Dates(s): as soon as possible   Patient Requested Time: as soon as possible   Provider Name:QuetaNancy, APRN - CNP     Note: The patient went to ER last night due to hip pain because prior to that,  for the last couple days she's having spasm on her right hip and back and needs to have a follow up with her primary care provider.     Reason for Appointment Request: Established Patient - Available appointments did not meet patient need  --------------------------------------------------------------------------------------------------------------------------    Relationship to Patient: Self     Call Back Information: OK to leave message on voicemail  Preferred Call Back Number: Phone [phone]

## 2024-11-13 NOTE — DISCHARGE INSTRUCTIONS
Seen in the emergency department for continued right hip pain.  CT imaging on 11/9/2024 was unremarkable for fracture.  I offered you observation unit admission for an MRI in the morning and pain control overnight however you state that you have 2 young children at home and were unable to stay the night here in the hospital.  Plan for discharge with Percocet and Advil.  Please take these prescriptions as they are prescribed.  Please follow-up with your primary care within the next 1 to 2 days.  If your hip pain becomes worse or you suffer any further trauma, numbness and tingling, fevers, further back pain, or any other concerning symptoms then please return to the emergency department for reevaluation.

## 2024-11-13 NOTE — TELEPHONE ENCOUNTER
Spoke with patient, denied sooner appointment with another physician, scheduled with PCP first available.

## 2024-12-17 ENCOUNTER — OFFICE VISIT (OUTPATIENT)
Dept: INTERNAL MEDICINE CLINIC | Age: 34
End: 2024-12-17

## 2024-12-17 VITALS
BODY MASS INDEX: 45.99 KG/M2 | HEIGHT: 67 IN | WEIGHT: 293 LBS | OXYGEN SATURATION: 99 % | SYSTOLIC BLOOD PRESSURE: 126 MMHG | DIASTOLIC BLOOD PRESSURE: 80 MMHG | HEART RATE: 84 BPM

## 2024-12-17 DIAGNOSIS — Z13.1 DIABETES MELLITUS SCREENING: ICD-10-CM

## 2024-12-17 DIAGNOSIS — M25.551 RIGHT HIP PAIN: Primary | ICD-10-CM

## 2024-12-17 DIAGNOSIS — D64.9 ANEMIA, UNSPECIFIED TYPE: ICD-10-CM

## 2024-12-17 DIAGNOSIS — R73.03 PREDIABETES: ICD-10-CM

## 2024-12-17 DIAGNOSIS — E66.01 MORBID OBESITY WITH BMI OF 70 AND OVER, ADULT: ICD-10-CM

## 2024-12-17 DIAGNOSIS — Z13.29 SCREENING FOR ENDOCRINE DISORDER: ICD-10-CM

## 2024-12-17 SDOH — ECONOMIC STABILITY: INCOME INSECURITY: HOW HARD IS IT FOR YOU TO PAY FOR THE VERY BASICS LIKE FOOD, HOUSING, MEDICAL CARE, AND HEATING?: NOT HARD AT ALL

## 2024-12-17 SDOH — ECONOMIC STABILITY: FOOD INSECURITY: WITHIN THE PAST 12 MONTHS, YOU WORRIED THAT YOUR FOOD WOULD RUN OUT BEFORE YOU GOT MONEY TO BUY MORE.: NEVER TRUE

## 2024-12-17 SDOH — ECONOMIC STABILITY: FOOD INSECURITY: WITHIN THE PAST 12 MONTHS, THE FOOD YOU BOUGHT JUST DIDN'T LAST AND YOU DIDN'T HAVE MONEY TO GET MORE.: NEVER TRUE

## 2024-12-17 ASSESSMENT — PATIENT HEALTH QUESTIONNAIRE - PHQ9
SUM OF ALL RESPONSES TO PHQ QUESTIONS 1-9: 0
4. FEELING TIRED OR HAVING LITTLE ENERGY: NOT AT ALL
10. IF YOU CHECKED OFF ANY PROBLEMS, HOW DIFFICULT HAVE THESE PROBLEMS MADE IT FOR YOU TO DO YOUR WORK, TAKE CARE OF THINGS AT HOME, OR GET ALONG WITH OTHER PEOPLE: NOT DIFFICULT AT ALL
SUM OF ALL RESPONSES TO PHQ QUESTIONS 1-9: 0
SUM OF ALL RESPONSES TO PHQ QUESTIONS 1-9: 0
3. TROUBLE FALLING OR STAYING ASLEEP: NOT AT ALL
8. MOVING OR SPEAKING SO SLOWLY THAT OTHER PEOPLE COULD HAVE NOTICED. OR THE OPPOSITE, BEING SO FIGETY OR RESTLESS THAT YOU HAVE BEEN MOVING AROUND A LOT MORE THAN USUAL: NOT AT ALL
1. LITTLE INTEREST OR PLEASURE IN DOING THINGS: NOT AT ALL
6. FEELING BAD ABOUT YOURSELF - OR THAT YOU ARE A FAILURE OR HAVE LET YOURSELF OR YOUR FAMILY DOWN: NOT AT ALL
5. POOR APPETITE OR OVEREATING: NOT AT ALL
SUM OF ALL RESPONSES TO PHQ9 QUESTIONS 1 & 2: 0
9. THOUGHTS THAT YOU WOULD BE BETTER OFF DEAD, OR OF HURTING YOURSELF: NOT AT ALL
SUM OF ALL RESPONSES TO PHQ QUESTIONS 1-9: 0
2. FEELING DOWN, DEPRESSED OR HOPELESS: NOT AT ALL
7. TROUBLE CONCENTRATING ON THINGS, SUCH AS READING THE NEWSPAPER OR WATCHING TELEVISION: NOT AT ALL

## 2024-12-17 NOTE — PROGRESS NOTES
\"Have you been to the ER, urgent care clinic since your last visit?  Hospitalized since your last visit?\"    YES    “Have you seen or consulted any other health care providers outside our system since your last visit?”    YES                  [facility]  [address] [address]  Dept: [phone]  Dept Fax: [phone]    Office Progress/Follow Up Note  Date of patient's visit: 12/17/2024   Patient's Name:  Quyen Mesa  YOB: 1990            Patient Care Team:  Nancy Birch APRN - CNP as PCP - General (Internal Medicine)  Nancy Birch APRN - CNP as PCP - Empaneled Provider    REASON FOR VISIT: Hip Pain (New pain-did have a fall just right hip.  Has not been taking anything for it. Having trouble standing and walking. ) and Weight Loss (Would like to discuss options. )        HISTORY OF PRESENT ILLNESS:      History was obtained from the patient. Quyen Mesa is a 34 y.o. is here for multiple medical concerns including Hip Pain (New pain-did have a fall just right hip.  Has not been taking anything for it. Having trouble standing and walking. ) and Weight Loss (Would like to discuss options. )    HPI  Right hip pain- able to bear weight but pain is affecting gait and standing gets painful. Had CT in Nov after fall, was negative for fx    Morbid obesity-BMI 79  She is interested in GLP1 for weight loss  Denies family history of MEN type 2 syndrome or medullary thyroid cancer.     She follows with gyne for menorrhagia - is asking about refill of megace. Reviewed last note from Dr Galindo with her where he said:  Today pt states she did not complete her bariatric surgery as [facility] stopped taking her [place] [employer]. She is requesting a new referral for her bariatric surgery.She wishes to stay on he Megace 20 mg bid until that surgery is completed. I informed her I would give her 60 days to get an appointment scheduled. If that was not completed

## 2024-12-19 DIAGNOSIS — N93.9 ABNORMAL UTERINE BLEEDING: ICD-10-CM

## 2024-12-19 DIAGNOSIS — D50.0 IRON DEFICIENCY ANEMIA DUE TO CHRONIC BLOOD LOSS: ICD-10-CM

## 2024-12-20 RX ORDER — MEGESTROL ACETATE 20 MG/1
20 TABLET ORAL 2 TIMES DAILY
Qty: 60 TABLET | Refills: 1 | Status: SHIPPED | OUTPATIENT
Start: 2024-12-20 | End: 2025-02-18

## 2024-12-27 ASSESSMENT — ENCOUNTER SYMPTOMS
DIARRHEA: 0
BACK PAIN: 1
COUGH: 0
ABDOMINAL PAIN: 0
WHEEZING: 0
COLOR CHANGE: 0
SHORTNESS OF BREATH: 1

## 2025-01-02 ENCOUNTER — HOSPITAL ENCOUNTER (OUTPATIENT)
Age: 35
Setting detail: SPECIMEN
Discharge: HOME | End: 2025-01-02

## 2025-01-02 DIAGNOSIS — Z13.1 DIABETES MELLITUS SCREENING: ICD-10-CM

## 2025-01-02 DIAGNOSIS — D64.9 ANEMIA, UNSPECIFIED TYPE: ICD-10-CM

## 2025-01-02 DIAGNOSIS — E66.01 MORBID OBESITY WITH BMI OF 70 AND OVER, ADULT: ICD-10-CM

## 2025-01-02 DIAGNOSIS — Z13.29 SCREENING FOR ENDOCRINE DISORDER: ICD-10-CM

## 2025-01-02 LAB
BASOPHILS # BLD: 0.03 K/UL (ref 0–0.2)
BASOPHILS NFR BLD: 0 % (ref 0–2)
CHOLEST SERPL-MCNC: 152 MG/DL (ref 0–199)
CHOLESTEROL/HDL RATIO: 4.8
EOSINOPHIL # BLD: 0.09 K/UL (ref 0–0.44)
EOSINOPHILS RELATIVE PERCENT: 1 % (ref 1–4)
ERYTHROCYTE [DISTWIDTH] IN BLOOD BY AUTOMATED COUNT: 17.4 % (ref 11.8–14.4)
EST. AVERAGE GLUCOSE BLD GHB EST-MCNC: 108 MG/DL
HBA1C MFR BLD: 5.4 % (ref 4–6)
HCT VFR BLD AUTO: 39.8 % (ref 36.3–47.1)
HDLC SERPL-MCNC: 32 MG/DL
HGB BLD-MCNC: 11.9 G/DL (ref 11.9–15.1)
IMM GRANULOCYTES # BLD AUTO: <0.03 K/UL (ref 0–0.3)
IMM GRANULOCYTES NFR BLD: 0 %
LDLC SERPL CALC-MCNC: 105 MG/DL (ref 0–100)
LYMPHOCYTES NFR BLD: 2.58 K/UL (ref 1.1–3.7)
LYMPHOCYTES RELATIVE PERCENT: 31 % (ref 24–43)
MCH RBC QN AUTO: 25.6 PG (ref 25.2–33.5)
MCHC RBC AUTO-ENTMCNC: 29.9 G/DL (ref 28.4–34.8)
MCV RBC AUTO: 85.6 FL (ref 82.6–102.9)
MONOCYTES NFR BLD: 0.56 K/UL (ref 0.1–1.2)
MONOCYTES NFR BLD: 7 % (ref 3–12)
NEUTROPHILS NFR BLD: 61 % (ref 36–65)
NEUTS SEG NFR BLD: 5.08 K/UL (ref 1.5–8.1)
NRBC BLD-RTO: 0 PER 100 WBC
PLATELET # BLD AUTO: 226 K/UL (ref 138–453)
PMV BLD AUTO: 11.7 FL (ref 8.1–13.5)
RBC # BLD AUTO: 4.65 M/UL (ref 3.95–5.11)
RBC # BLD: ABNORMAL 10*6/UL
TRIGL SERPL-MCNC: 77 MG/DL (ref 0–149)
TSH SERPL DL<=0.05 MIU/L-ACNC: 1.98 UIU/ML (ref 0.27–4.2)
VLDLC SERPL CALC-MCNC: 15 MG/DL (ref 1–30)
WBC OTHER # BLD: 8.4 K/UL (ref 3.5–11.3)

## 2025-01-03 ENCOUNTER — TELEPHONE (OUTPATIENT)
Dept: INTERNAL MEDICINE CLINIC | Age: 35
End: 2025-01-03

## 2025-01-03 DIAGNOSIS — D50.0 IRON DEFICIENCY ANEMIA DUE TO CHRONIC BLOOD LOSS: Primary | ICD-10-CM

## 2025-01-03 RX ORDER — FERROUS SULFATE 325(65) MG
325 TABLET ORAL EVERY OTHER DAY
Qty: 90 TABLET | Refills: 1 | Status: SHIPPED | OUTPATIENT
Start: 2025-01-03

## 2025-01-03 NOTE — TELEPHONE ENCOUNTER
Patient was late getting her labs done but finally did yesterday.  She is wondering if it would be okay to start the weight loss medication.     Patient states she was told she had to wait for approval.    Please advise

## 2025-01-03 NOTE — TELEPHONE ENCOUNTER
Labs addressed through result note. Yes, she can start Zepbound. I think we discussed seeing if it was covered under her insurance before beginning it, so if they covered it, then yes please begin.

## 2025-01-06 NOTE — TELEPHONE ENCOUNTER
Attempted to contact patient this morning, no answer, unable to leave VM. Patients PA for Tirzepatide was denied by insurance due to it not being covered. Patient will need to contact insurance to see if anything else is covered for weight loss and let us know.

## 2025-01-24 ENCOUNTER — TELEMEDICINE (OUTPATIENT)
Dept: OBGYN CLINIC | Age: 35
End: 2025-01-24

## 2025-01-24 DIAGNOSIS — Z72.0 TOBACCO ABUSE: ICD-10-CM

## 2025-01-24 DIAGNOSIS — N84.0 UTERINE POLYP: Primary | ICD-10-CM

## 2025-01-24 DIAGNOSIS — N93.9 ABNORMAL UTERINE BLEEDING: ICD-10-CM

## 2025-01-24 DIAGNOSIS — G47.33 OSA (OBSTRUCTIVE SLEEP APNEA): ICD-10-CM

## 2025-01-24 DIAGNOSIS — N94.89 ENDOMETRIAL MASS: ICD-10-CM

## 2025-01-24 DIAGNOSIS — N94.6 DYSMENORRHEA: ICD-10-CM

## 2025-01-24 DIAGNOSIS — I10 ESSENTIAL HYPERTENSION: ICD-10-CM

## 2025-01-24 DIAGNOSIS — Z98.890 STATUS POST D&C: ICD-10-CM

## 2025-01-24 DIAGNOSIS — I10 CHRONIC HYPERTENSION: ICD-10-CM

## 2025-01-24 DIAGNOSIS — R93.89 ENDOMETRIAL THICKENING ON ULTRASOUND: ICD-10-CM

## 2025-01-24 DIAGNOSIS — N85.2 ENLARGED UTERUS: ICD-10-CM

## 2025-01-24 NOTE — PROGRESS NOTES
hypertension     LASHAY (obstructive sleep apnea)     has machine but doesn't use    SOB (shortness of breath) on exertion          Past Surgical History:   Procedure Laterality Date    DILATION AND CURETTAGE OF UTERUS N/A 2023    DILATATION AND CURETTAGE HYSTEROSCOPY WITH MYOSURE performed by Kip Galindo DO at [facility] OR    WISDOM TOOTH EXTRACTION Left          Family History   Problem Relation Age of Onset    Diabetes Mother          Social History     Tobacco Use    Smoking status: Former     Types: Cigars     Start date:      Quit date: 2023     Years since quittin.8    Smokeless tobacco: Never    Tobacco comments:     1-2 black and mild   Vaping Use    Vaping status: Never Used   Substance Use Topics    Alcohol use: Yes     Comment: Occasionally     Drug use: Not Currently     Types: Marijuana (Weed)         MEDICATIONS:  Current Outpatient Medications   Medication Sig Dispense Refill    ferrous sulfate (IRON 325) 325 (65 Fe) MG tablet Take 1 tablet by mouth every other day 90 tablet 1    megestrol (MEGACE) 20 MG tablet Take 1 tablet by mouth 2 times daily 60 tablet 1    tirzepatide-weight management (ZEPBOUND) 2.5 MG/0.5ML SOLN subCUTAneous injection (VIAL) Inject 0.5 mLs into the skin once a week 2 mL 1    ibuprofen (ADVIL;MOTRIN) 600 MG tablet Take 1 tablet by mouth every 8 hours as needed for Pain 10 tablet 0    acetaminophen (TYLENOL) 500 MG tablet Take 1 tablet by mouth 4 times daily as needed for Pain 20 tablet 0     No current facility-administered medications for this visit.         ALLERGIES:  Allergies as of 2025    (No Known Allergies)           Review Of Systems (11 point):  Constitutional: No fever, chills or malaise; No weight change or fatigue  Head and Eyes: No vision changes, Headache, Dizziness or trauma in last 12 months  ENT ROS: No hearing, Tinnitis, sinus or taste problems  Hematological and Lymphatic ROS:No Lymphoma, Von Willebrand's, Hemophillia or Bleeding

## 2025-01-27 ENCOUNTER — TELEPHONE (OUTPATIENT)
Dept: OBGYN CLINIC | Age: 35
End: 2025-01-27

## 2025-01-27 RX ORDER — METRONIDAZOLE 500 MG/1
500 TABLET ORAL 2 TIMES DAILY
Qty: 14 TABLET | Refills: 0 | Status: SHIPPED | OUTPATIENT
Start: 2025-01-27 | End: 2025-02-03

## 2025-01-27 NOTE — TELEPHONE ENCOUNTER
Called pt to schedule her follow up from her virtual she had w  on 01/24/25 , pt is schedule for 04/02/25 for her annual , but she states she is having symptoms of BV  she is asking for a script to be called into  Carter Blanchard

## (undated) DEVICE — DEVICE TISS REM IU CANSTR VAC TB FT PEDAL DISPOSABLE MYOSURE

## (undated) DEVICE — GLOVE ORANGE PI 8   MSG9080

## (undated) DEVICE — SINGLE PORT MANIFOLD: Brand: NEPTUNE 2

## (undated) DEVICE — SET,IRRIGATION,CYSTO/TUR,90": Brand: MEDLINE

## (undated) DEVICE — 3000ML,PRESSURE INFUSER W/STOPCOCK: Brand: MEDLINE

## (undated) DEVICE — SOLUTION IRRIG 1000ML STRL H2O USP PLAS POUR BTL

## (undated) DEVICE — GOWN,SIRUS,NONRNF,SETINSLV,XL,20/CS: Brand: MEDLINE

## (undated) DEVICE — SOLUTION IRRIG 3000ML 0.9% SOD CHL USP UROMATIC PLAS CONT

## (undated) DEVICE — MERCY HEALTH ST CHARLES: Brand: MEDLINE INDUSTRIES, INC.

## (undated) DEVICE — ST CHARLES PERI-GYN PACK: Brand: MEDLINE INDUSTRIES, INC.